# Patient Record
Sex: FEMALE | Race: WHITE | Employment: OTHER | URBAN - METROPOLITAN AREA
[De-identification: names, ages, dates, MRNs, and addresses within clinical notes are randomized per-mention and may not be internally consistent; named-entity substitution may affect disease eponyms.]

---

## 2017-03-08 ENCOUNTER — OFFICE VISIT (OUTPATIENT)
Dept: SLEEP MEDICINE | Facility: CLINIC | Age: 72
End: 2017-03-08
Attending: INTERNAL MEDICINE
Payer: MEDICARE

## 2017-03-08 VITALS
RESPIRATION RATE: 12 BRPM | DIASTOLIC BLOOD PRESSURE: 47 MMHG | HEART RATE: 62 BPM | SYSTOLIC BLOOD PRESSURE: 135 MMHG | BODY MASS INDEX: 51.91 KG/M2 | HEIGHT: 63 IN | WEIGHT: 293 LBS | OXYGEN SATURATION: 96 %

## 2017-03-08 DIAGNOSIS — G47.33 OSA (OBSTRUCTIVE SLEEP APNEA): ICD-10-CM

## 2017-03-08 NOTE — PROGRESS NOTES
"Sleep New Patient Note:    Date on this visit: 3/8/2017    Angella Jasmine is self-referred patient being in evaluated in the sleep clinic regarding obstructive sleep apnea.    Primary Physician: Mark Askew     CC:  \"Need the yearly check-up and probably new equipment, I think machine might be leaking.\"    Aneglla Jasmine 71 year old with PMH SG, HTN, obesity, Sjogren's, RA, gout who is here to re-establish care for SG. She had a sleep study 1/14/07:  AHI of 43, REM RDI 92.6, lower oxygen saturation 63%, sleep efficiency 45.8%. She underwent titration PSG 2/3/2007 but had very poor sleep efficiency and was recommended a repeat titration study to be performed. Repeat titration 4/1/07 titrated to cpap 12 cwp, sleep efficiency 54.6%.    She currently uses a nasal mask. Denies any issues with CPAP. Denies any mask, pressure, or humidity intolerance. She uses it every night; she can't sleep without it. About 1 month, she was on vacation with her sister and she noticed leaking sounds from the mask. She stated that she was concerned that it may not be giving her enough \"oxygen.\" She also stated that the machine was loud. Her  has not described any of these complaints.  Angella does not complain of snoring, snort arousals, choking/gasping for air, witnessed apneas, mouth breathing, daytime sleepiness. She will sometimes have dry mouth, rare morning headaches, daytime fatigue (only if she didn't sleep enough that night). Her weight has fluctuated over the years. Prior to CPAP, she was constantly exhausted and waking up choking/gasping for air. She does not have these symptoms with CPAP therapy now.    She currently lives in [place], [place], 45 minutes from this sleep clinic. She tried to go to [facility] sleep clinic, but they did not take her health insurance (Medicare). She had been using use the extra supplies from her previous sleep clinic visits. She ran out and decided to come back for follow up and prescription for " more supplies.    Sleep Schedule  She does not complain of difficulty with falling asleep when she wears her machine. Angella goes to bed at 11PM, and it usually takes less than 20 minutes to fall asleep. Angella does not complain of restless feelings in the legs/arms, worse at night when trying to sleep. She does not complain of spontaneous leg movements/jerks in the middle of the night. Patient does not complain of chronic pain, ruminating thoughts, stress/anxiety, depression, which affects the onset of sleep. Patient does use electronics in bed (phone, TV, computer). Patient does have a regular bed partner. She sleeps on her back. Patient does not have any pets in the bedroom at night during sleep. She does/not use a sleep aid.    She does not complain of difficulty with  staying asleep. She wakes up 1-2 times throughout the night.  Night time awakenings occurs due to use the bathroom. After awakening, She is able to fall back asleep after 15 minutes. She wakes up at 7AM, without an alarm.    On the weekends/vacation, She falls asleep at 11PM and gets up 7AM.  Patient describes themself as neither a morning or night person.  She would prefer to go to sleep at 11:00 PM and wake up at 7:00 AM.    Sleep Behaviors  She denies any cataplexy, sleep paralysis, sleep hallucinations. She denies any night time behaviors - sleep walking, sleep talking, sleep eating. She does not complain acting out dreams. Patient denies any injury to oneself or others while sleeping. Patient has/not fallen out of bed.    Daytime Functioning  She generally does not take naps during the day. She does doze off during the day occasionally sometimes when watching TV. She denies closing eyes, dozing and falling asleep while driving. Patient's Stanton Sleepiness score 6/24 consistent with no daytime sleepiness. 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  No shift work in the past.  She does drink caffeine, about 23/day. Last caffeine  intake is not within 6 hours of bed time.  She drinks alcohol, rarely on special occasion.  Does not use alcohol as a sleep aid. Patient does not smoke. She smoked tobacco in her 20s but quit since then. Patient does not use drugs.   No future surgeries planned.      Allergies:    Allergies   Allergen Reactions     Allopurinol Hives     Cephalosporins      suprax   hives     Codeine      HALLUCINATIONS     Erythromycin      stomach pain     Hydrochlorothiazide Nausea     very irritated stomach and a lot of itching in mouth.     Penicillins      severe stomach reaction and hives     Prednisone      legs swelling     Sulfa Drugs      hives       Medications:    Current Outpatient Prescriptions   Medication Sig Dispense Refill     Omega-3 Fatty Acids (OMEGA-3 FISH OIL PO) Take 3 capsules by mouth daily       vitamin D (ERGOCALCIFEROL) 09514 UNIT capsule Take 1 capsule (50,000 Units) by mouth once a week 12 capsule 3     hydroxychloroquine (PLAQUENIL) 200 MG tablet Take 2 tablets (400 mg) by mouth daily annual eye exam needed for further refills fax results to [phone] 180 tablet 3     cevimeline (EVOXAC) 30 MG capsule Take 1 capsule (30 mg) by mouth 3 times daily 270 capsule 3     levothyroxine (SYNTHROID, LEVOTHROID) 175 MCG tablet Take 1 tablet (175 mcg) by mouth daily (Patient taking differently: Take 150 mcg by mouth daily ) 90 tablet 3     amLODIPine (NORVASC) 5 MG tablet Take 1 tablet (5 mg) by mouth daily 90 tablet 12     terazosin (HYTRIN) 5 MG capsule Take 1 capsule (5 mg) by mouth At Bedtime 90 capsule 1     atenolol (TENORMIN) 100 MG tablet Take 1 tablet (100 mg) by mouth daily 90 tablet 1     febuxostat (ULORIC) 40 MG TABS Take 1 tablet (40 mg) by mouth daily 90 tablet 1     clindamycin (CLEOCIN) 300 MG capsule Take  by mouth. 2 capsules 1 hour prior to dental procedures 2 capsule 5     ORDER FOR DME CPAP at 12 cm water with heated humidity and mask to fit with appropriate suportive supplies. On at  night off in the AM. 1 0       Problem List:  Patient Active Problem List    Diagnosis Date Noted     Long-term use of immunosuppressant medication 06/30/2015     Encounter for long-term current use of medication 07/22/2013     Problem list name updated by automated process. Provider to review       Obesity 05/09/2013     Vitamin D insufficiency 05/09/2013     RA (rheumatoid arthritis) (H) 10/09/2012     Thrombocytopenia (H) 10/09/2012     Leukopenia 08/20/2012     CKD (chronic kidney disease) stage 3, GFR 30-59 ml/min 06/09/2012     Gout 11/15/2011     SG (obstructive sleep apnea) 08/09/2011     CPAP 12mmHg working well       Sjogren's syndrome (H) 12/12/2008     Primary localized osteoarthrosis, lower leg 10/12/2006     Essential hypertension 03/04/2005     Problem list name updated by automated process. Provider to review       Allergic rhinitis due to other allergen 11/30/2001     Hypothyroidism 11/30/2001     Problem list name updated by automated process. Provider to review       Symptomatic menopausal or female climacteric states 11/30/2001     Dermatophytosis of nail 11/30/2001        Past Medical/Surgical History:  Past Medical History   Diagnosis Date     Allergic rhinitis due to other allergen      Body mass index 40 and over, adult      CKD (chronic kidney disease), stage III      Dermatophytosis of nail      Diverticulosis of colon (without mention of hemorrhage)      Essential hypertension, benign      Localized osteoarthrosis not specified whether primary or secondary, lower leg 2/24/08     Hospitalized     Obstructive sleep apnea      severe     Primary localized osteoarthrosis, lower leg 8/30/08     Hospitalized     Sjogren's syndrome (H)      Symptomatic menopausal or female climacteric states      Unspecified hypothyroidism      Past Surgical History   Procedure Laterality Date     Hc colonoscopy w/wo brush/wash  01/17/00     Normal with the exception of a few small scattered diverticula in  "sigmoid colon     Hc colonoscopy thru stoma w biopsy/cautery tumor/polyp/lesion  2/19/96     Sigmoid colon polyps     Hc removal of ovary/tube(s)  8/10/93     Bilateral     C vaginal hysterectomy  7/26/79     Hc repair of nasal septum  1/91     [place]     C appendectomy       Hc colonoscopy w/wo brush/wash  03/25/05     repeat in 5 years     Hysterectomy, pap no longer indicated  1979     C total knee arthroplasty  2/21/08     right knee partial arthroplasty     C plasty knee,med or lat compartmt  08/28/08     LT     Hc colonoscopy w snare removal tumor/polyp/lesion  05/03/10       Social History:  Social History     Social History     Marital status:      Spouse name: Guero     Number of children: 2     Years of education: 14     Occupational History     homemaker      Social History Main Topics     Smoking status: Former Smoker     Packs/day: 0.25     Years: 2.00     Types: Cigarettes     Quit date: 2/17/1973     Smokeless tobacco: Never Used      Comment: no second hand smoke at home or work     Alcohol use 0.3 oz/week      Comment: rarely     Drug use: Not on file     Sexual activity: Not on file     Other Topics Concern      Service No     Blood Transfusions No     Caffeine Concern Yes     4 cups of coffee a day     Occupational Exposure No     Hobby Hazards No     Sleep Concern Yes     have problems with sleeping \"I get my brain going and can't seem to turn it off\"     Stress Concern No     Weight Concern Yes     dieting now     Special Diet No     Back Care Yes     back aches sometimes     Exercise Yes     leg exercises     Seat Belt Yes     Self-Exams Yes     Social History Narrative       Family History:  Family History   Problem Relation Age of Onset     Hypertension Daughter      Thyroid Disease Mother      Arthritis Mother      CANCER Mother      Stomach & Ovarian cancer     Breast Cancer Mother      Bilateral     Hypertension Mother      Cancer - colorectal Father      Around age 65 "     Thyroid Disease Sister      Hypertension Sister      Obesity Sister      Allergies Sister      Hypertension Brother      Hypertension Brother      Hypertension Brother      CANCER Maternal Aunt      Breast     CANCER Other      Maternal cousin with bilateral breast ca     Cancer - colorectal Paternal Uncle      DIABETES No family hx of      CEREBROVASCULAR DISEASE No family hx of      Anesthesia Reaction No family hx of      Blood Disease No family hx of      HEART DISEASE No family hx of      Lipids No family hx of      Musculoskeletal Disorder No family hx of      Neurologic Disorder No family hx of      OSTEOPOROSIS No family hx of      Respiratory No family hx of        Review of Systems:  A complete review of systems reviewed by me is negative with the exeption of what has been mentioned in the history of present illness.  CONSTITUTIONAL: NEGATIVE for weight loss, fever, chills, sweats or night sweats, drug allergies POS weight gain  EYES: NEGATIVE for changes in vision, blind spots, double vision.  ENT: NEGATIVE for ear pain, sore throat, sinus pain, post-nasal drip, runny nose, bloody nose  CARDIAC: NEGATIVE for fast heartbeats or fluttering in chest, chest pain or pressure, breathlessness when lying flat, POS HTN, swollen legs or swollen feet.  NEUROLOGIC: NEGATIVE headaches, weakness or numbness in the arms or legs.  DERMATOLOGIC: NEGATIVE for rashes, new moles or change in mole(s)  PULMONARY: NEGATIVE SOB at rest, dry cough, productive cough, coughing up blood, wheezing or whistling when breathing POS  SOB with activity  GASTROINTESTINAL: NEGATIVE for nausea or vomitting, loose or watery stools, fat or grease in stools, constipation, abdominal pain, bowel movements black in color or blood noted.  GENITOURINARY: NEGATIVE for pain during urination, blood in urine, urinating more frequently than usual, irregular menstrual periods.  MUSCULOSKELETAL: NEGATIVE for muscle pain, bone or joint pain, swollen  "joints.  ENDOCRINE: NEGATIVE for increased thirst or urination, diabetes.  LYMPHATIC: NEGATIVE for swollen lymph nodes, lumps or bumps in the breasts or nipple discharge.  PSYCHE: NEGATIVE for depression, anxiety    Physical Examination:  Vitals: Ht 1.6 m (5' 3\")  Wt (!) 152.4 kg (336 lb)  BMI 59.52 kg/m2  BMI= Body mass index is 59.52 kg/(m^2).         Avon Total Score 8/29/2013   Total score - [place] 3       General: No apparent distress, appropriately groomed  Head: Normocephalic, atraumatic  Eyes: no icterus, PERRL  Nose: Nares patent. No exudate/erythema. No septal deviation noted.  Mouth: op pink and moist, teeth: normal bite  Orophraynx: Opening is narrowed   Mallampati Class: III.   Tonsillar Stage: 1  hidden by pillars.  Neck: Supple, Circumference: 17 inches  Cardiac: Regular rate and rhythm  Chest: Symmetric air movement, lungs clear to auscultation bilaterally  GI: Abdomen soft, non-tender, non-distended  Musculoskeletal: + edema noted  Skin: Warm, dry, intact  Psych: Mood pleasant, affect congruent  Neuro:  Mental status: Awake, alert, attentive, oriented.        Impression/Plan:    Obstructive Sleep Apnea - Severe    Used the device 30/30 days  More than 4 hours of use: 100%  Average daily use on the days used was 6 hours 41 min  Pressure 12-18 cm H2O  Residual AHI 6.2  Leak 19 min    Patient is being evaluated for continued management of her SG. She was diagnosed with severe SG in 2007. She has been on PAP therapy since then. She continues to endorse benefit from therapy. Data from her machine shows a residual AHI 6.2 events per hour. Some nights the AHI is elevated, 8-17 events per hour. Her pressure is set on auto-CPAP 12 to 18 cwp. Machine indicates that she does reach up to higher pressure, 15-17 cwp at times. There is also evidence for significant mask leak. Before making any changes to her machine pressure, recommend getting new equipment and reevaluate in about 4-6 weeks.    If the leak " has resolved, and her SG remains controlled, will continue with the same treatment. If she is still requiring high pressures and develops some intolerance to the high pressures, bilevel therapy was discussed as a possible alternative treatment. She may need a repeat sleep study in order obtain this. She denies any malfunctioning of her current machine. If at some point she needs a new machine, she will likely need to undergo a repeat sleep study.    Continue smoking cessation. Encourage weight loss, healthy diet, and exercise.    Patient was strongly advised to avoid driving, operating any heavy machinery or other hazardous situations while drowsy or sleepy.  Patient was counseled on the importance of driving while alert, to pull over if drowsy, or nap before getting into the vehicle if sleepy.        Follow up in 4-6 weeks for continued management.      Seen and examined with Dr. Josafat Sanchez  Clinical Sleep Medicine Fellow          Attending: Patient seen and examined and personally counseled.  This note reflects our mutual assessment and plan.  Jacquelin Maurice MD  Pulmonary, Critical Care, and Sleep Medicine

## 2017-03-08 NOTE — PATIENT INSTRUCTIONS
1.  Continue CPAP every night for all hours that you are sleeping.  If you nap use CPAP.  As always, try to get at least 8 hours of sleep or more each day, and avoid sleep deprivation. Avoid alcohol.    2.  Reasons that you might need a change to your pressure therapy would be weight gain or loss, waking having inadvertently removed your CPAP overnight, having previously felt refreshed by sleep with CPAP use and now waking un-refreshed, and return of daytime sleepiness. Also, the development of new medical problems can sometimes affect breathing at night-heart failure, stroke, medications such as narcotics.    3.  Please bring CPAP with you if you are hospitalized.  If anticipating surgery be sure to discuss with your surgeon that you have sleep apnea and use PAP therapy.      4.  Maintain your equipment as recommended which includes routine cleaning and replacement of supplies.  Call DME for any questions regarding supplies or maintenance.      5.  Do not drive on engage in potentially dangerous activities if feeling sleepy.        [facility] DME and cpap specialist [place] [phone]  [facility] [phone]    [facility] DME [phone], CPAP specialist [phone]  [facility] [phone]    [facility], [facility] [phone]    [facility] DME  Elodia Torres [phone]  [facility] DME [place] [phone]  [facility] phone [phone]         Tips for your CPAP and BIPAP use-    Mask fitting tips  Mask fitting exercise:    To improve your mask seal and your mobility at night, put mask on and secure in place.  Lie down in bed with full pressure and roll to one side, adjust headgear while in that position to eliminate any leaks. Repeat process rolling to other side.     The mask seal does not have to be  perfect:   CPAP machines are designed to make up for small leaks. However, you will not tolerate leaks blowing in your eyes so you will need to adjust.   Any leak should only be near or at the bottom of the mask.  We expect your mask to leak slightly at night.    Do not over-tighten the headgear straps, tighter IS NOT better, we expect minimal leak.    First try re-positioning the mask or headgear before tightening the headgear straps.  Mask leaks are expected due to changing sleeping positions. Try pulling the mask away from your skin allowing the cushion to re-inflate will minimize the leak.  If you struggle for a good fit, try turning the CPAP off and then readjust the mask by pulling it away from your face and then turning back on the CPAP.        Humidifier tips  Humidifiers can be adjusted to increase or decrease the amount of moisture according to your comfort level. You may need to adjust this frequently at first, but then might only change it with seasonal weather changes.     Try INCREASING the humidity if:  You experience a dry, irritated nasal passage or throat.  You have a runny, drippy nose or sneezing fits after using CPAP.  You experience nasal congestion during or after CPAP use.    Try DECREASING the humidity if:  You have excessive condensation or  rain out  in the tubing or mask.  Otherwise keep the tubing warm during the night by running it underneath the blankets or pillow.      Clinic visit after initial CPAP and BIPAP set-up   Bring your equipment with you to your 4 week follow up clinic visit.  We will be extracting your data from the machine.        Travel  Always take your equipment with you.  If you fly with your equipment bring it on with you as a carry on.  Medical equipment does not count as a carry on.    If you travel international the machines take 110-240.  The only adapter needed is the adapter that will fit into the receptacle (outlet).    You may also want to bring an extension  cord as many hotel rooms have limited outlets at the bedside.  Do not travel with water in your humidifier chamber.     Cleaning and Maintenance Guidelines    Equipment Frequency Cleaning Method   Mask First Day    Daily      Weekly Soak mask in hot soapy water for 30 minutes, rinse and air dry.  Wipe nasal cushion with a hot soapy (Ivory, baby shampoo) cloth and rinse.  Baby wipes may also be used.  Do not use anti-bacterial soaps,Nell  liquid soap, rubbing alcohol, bleach or ammonia.  Wash frame in hot soapy water (Ivory, baby shampoo) rinse and let air dry   Headgear Biweekly Wash in hot soapy water, rinse and air dry   Reusable Gray Filter Weekly Wash in hot soapy water, rinse, put in towel squeeze moisture out, let air dry   Disposable White Filter Check Weekly Replace when brown or gray in color; at least every 2 to 3 months   Humidifier Chamber Daily    Weekly Empty distilled water from humidifier and let air dry    Hand wash in hot soapy water, rinse and air dry   Tubing Weekly Wash in hot soapy water, rinse and let air dry   Mask, Tubing and Humidifier Chamber As needed Disinfect: Soak in 1 part vinegar to 3 parts hot water for 30 minutes, rinse well and air dry  Not the material headgear        MASK AND SUPPLY REORDERING  Reminder: Most insurance companies will allow for a new mask, headgear, tubing, and reusable gray filter every six months.  Disposable white ultra-fine filters are covered monthly.    EQUIPMENT NEEDS  Please call our CPAP specialist with any equipment problems, questions or needs.    HOME AND SAFETY INSTRUCTIONS    Do not use frayed or cracked electrical cords, multi plug adaptors, or switched receptacles    Do not immerse electrical equipment into water    Assure that electrical cords do not become a tripping hazard      Your BMI is Body mass index is 59.52 kg/(m^2).  Weight management is a personal decision.  If you are interested in exploring weight loss strategies, the following  discussion covers the approaches that may be successful. Body mass index (BMI) is one way to tell whether you are at a healthy weight, overweight, or obese. It measures your weight in relation to your height.  A BMI of 18.5 to 24.9 is in the healthy range. A person with a BMI of 25 to 29.9 is considered overweight, and someone with a BMI of 30 or greater is considered obese. More than two-thirds of American adults are considered overweight or obese.  Being overweight or obese increases the risk for further weight gain. Excess weight may lead to heart disease and diabetes.  Creating and following plans for healthy eating and physical activity may help you improve your health.  Weight control is part of healthy lifestyle and includes exercise, emotional health, and healthy eating habits. Careful eating habits lifelong are the mainstay of weight control. Though there are significant health benefits from weight loss, long-term weight loss with diet alone may be very difficult to achieve- studies show long-term success with dietary management in less than 10% of people. Attaining a healthy weight may be especially difficult to achieve in those with severe obesity. In some cases, medications, devices and surgical management might be considered.  What can you do?  If you are overweight or obese and are interested in methods for weight loss, you should discuss this with your provider.     Consider reducing daily calorie intake by 500 calories.     Keep a food journal.     Avoiding skipping meals, consider cutting portions instead.    Diet combined with exercise helps maintain muscle while optimizing fat loss. Strength training is particularly important for building and maintaining muscle mass. Exercise helps reduce stress, increase energy, and improves fitness. Increasing exercise without diet control, however, may not burn enough calories to loose weight.       Start walking three days a week 10-20 minutes at a  time    Work towards walking thirty minutes five days a week     Eventually, increase the speed of your walking for 1-2 minutes at time    In addition, we recommend that you review healthy lifestyles and methods for weight loss available through the National Institutes of Health patient information sites:  http://win.niddk.nih.gov/publications/index.htm    And look into health and wellness programs that may be available through your health insurance provider, employer, local community center, or gregory club.    Weight management plan: Patient was referred to their PCP to discuss a diet and exercise plan.

## 2017-03-08 NOTE — MR AVS SNAPSHOT
After Visit Summary   3/8/2017    Angella Jasmine    MRN: [id number]           Patient Information     Date Of Birth          1945        Visit Information        Provider Department      3/8/2017 1:00 PM Laun Sanchez MD [facility], [place], Sleep Study        Today's Diagnoses     SG (obstructive sleep apnea) Severe          Care Instructions    1.  Continue CPAP every night for all hours that you are sleeping.  If you nap use CPAP.  As always, try to get at least 8 hours of sleep or more each day, and avoid sleep deprivation. Avoid alcohol.    2.  Reasons that you might need a change to your pressure therapy would be weight gain or loss, waking having inadvertently removed your CPAP overnight, having previously felt refreshed by sleep with CPAP use and now waking un-refreshed, and return of daytime sleepiness. Also, the development of new medical problems can sometimes affect breathing at night-heart failure, stroke, medications such as narcotics.    3.  Please bring CPAP with you if you are hospitalized.  If anticipating surgery be sure to discuss with your surgeon that you have sleep apnea and use PAP therapy.      4.  Maintain your equipment as recommended which includes routine cleaning and replacement of supplies.  Call DME for any questions regarding supplies or maintenance.      5.  Do not drive on engage in potentially dangerous activities if feeling sleepy.        [facility] DME and cpap specialist Missy [phone]  [facility] [phone]    [facility] DME [phone], CPAP specialist [phone]  [facility] [phone]    [place] [facility], [facility] [phone]    [facility] [place] D[facility]  Elodia Torres [phone]  [facility] [place] [facility] Concha [phone]  [facility] phone [phone]         Tips  for your CPAP and BIPAP use-    Mask fitting tips  Mask fitting exercise:    To improve your mask seal and your mobility at night, put mask on and secure in place.  Lie down in bed with full pressure and roll to one side, adjust headgear while in that position to eliminate any leaks. Repeat process rolling to other side.     The mask seal does not have to be perfect:   CPAP machines are designed to make up for small leaks. However, you will not tolerate leaks blowing in your eyes so you will need to adjust.   Any leak should only be near or at the bottom of the mask.  We expect your mask to leak slightly at night.    Do not over-tighten the headgear straps, tighter IS NOT better, we expect minimal leak.    First try re-positioning the mask or headgear before tightening the headgear straps.  Mask leaks are expected due to changing sleeping positions. Try pulling the mask away from your skin allowing the cushion to re-inflate will minimize the leak.  If you struggle for a good fit, try turning the CPAP off and then readjust the mask by pulling it away from your face and then turning back on the CPAP.        Humidifier tips  Humidifiers can be adjusted to increase or decrease the amount of moisture according to your comfort level. You may need to adjust this frequently at first, but then might only change it with seasonal weather changes.     Try INCREASING the humidity if:  You experience a dry, irritated nasal passage or throat.  You have a runny, drippy nose or sneezing fits after using CPAP.  You experience nasal congestion during or after CPAP use.    Try DECREASING the humidity if:  You have excessive condensation or  rain out  in the tubing or mask.  Otherwise keep the tubing warm during the night by running it underneath the blankets or pillow.      Clinic visit after initial CPAP and BIPAP set-up   Bring your equipment with you to your 4 week follow up clinic visit.  We will be extracting your data from the  machine.        Travel  Always take your equipment with you.  If you fly with your equipment bring it on with you as a carry on.  Medical equipment does not count as a carry on.    If you travel international the machines take 110-240.  The only adapter needed is the adapter that will fit into the receptacle (outlet).    You may also want to bring an extension cord as many hot rooms have limited outlets at the bedside.  Do not travel with water in your humidifier chamber.     Cleaning and Maintenance Guidelines    Equipment Frequency Cleaning Method   Mask First Day    Daily      Weekly Soak mask in hot soapy water for 30 minutes, rinse and air dry.  Wipe nasal cushion with a hot soapy (Ivory, baby shampoo) cloth and rinse.  Baby wipes may also be used.  Do not use anti-bacterial soaps,Nell  liquid soap, rubbing alcohol, bleach or ammonia.  Wash frame in hot soapy water (Ivory, baby shampoo) rinse and let air dry   Headgear Biweekly Wash in hot soapy water, rinse and air dry   Reusable Gray Filter Weekly Wash in hot soapy water, rinse, put in towel squeeze moisture out, let air dry   Disposable White Filter Check Weekly Replace when brown or gray in color; at least every 2 to 3 months   Humidifier Chamber Daily    Weekly Empty distilled water from humidifier and let air dry    Hand wash in hot soapy water, rinse and air dry   Tubing Weekly Wash in hot soapy water, rinse and let air dry   Mask, Tubing and Humidifier Chamber As needed Disinfect: Soak in 1 part vinegar to 3 parts hot water for 30 minutes, rinse well and air dry  Not the material headgear        MASK AND SUPPLY REORDERING  Reminder: Most insurance companies will allow for a new mask, headgear, tubing, and reusable gray filter every six months.  Disposable white ultra-fine filters are covered monthly.    EQUIPMENT NEEDS  Please call our CPAP specialist with any equipment problems, questions or needs.    HOME AND SAFETY INSTRUCTIONS    Do not use frayed  or cracked electrical cords, multi plug adaptors, or switched receptacles    Do not immerse electrical equipment into water    Assure that electrical cords do not become a tripping hazard      Your BMI is Body mass index is 59.52 kg/(m^2).  Weight management is a personal decision.  If you are interested in exploring weight loss strategies, the following discussion covers the approaches that may be successful. Body mass index (BMI) is one way to tell whether you are at a healthy weight, overweight, or obese. It measures your weight in relation to your height.  A BMI of 18.5 to 24.9 is in the healthy range. A person with a BMI of 25 to 29.9 is considered overweight, and someone with a BMI of 30 or greater is considered obese. More than two-thirds of American adults are considered overweight or obese.  Being overweight or obese increases the risk for further weight gain. Excess weight may lead to heart disease and diabetes.  Creating and following plans for healthy eating and physical activity may help you improve your health.  Weight control is part of healthy lifestyle and includes exercise, emotional health, and healthy eating habits. Careful eating habits lifelong are the mainstay of weight control. Though there are significant health benefits from weight loss, long-term weight loss with diet alone may be very difficult to achieve- studies show long-term success with dietary management in less than 10% of people. Attaining a healthy weight may be especially difficult to achieve in those with severe obesity. In some cases, medications, devices and surgical management might be considered.  What can you do?  If you are overweight or obese and are interested in methods for weight loss, you should discuss this with your provider.     Consider reducing daily calorie intake by 500 calories.     Keep a food journal.     Avoiding skipping meals, consider cutting portions instead.    Diet combined with exercise helps  maintain muscle while optimizing fat loss. Strength training is particularly important for building and maintaining muscle mass. Exercise helps reduce stress, increase energy, and improves fitness. Increasing exercise without diet control, however, may not burn enough calories to loose weight.       Start walking three days a week 10-20 minutes at a time    Work towards walking thirty minutes five days a week     Eventually, increase the speed of your walking for 1-2 minutes at time    In addition, we recommend that you review healthy lifestyles and methods for weight loss available through the National Institutes of Health patient information sites:  http://win.niddk.nih.gov/publications/index.htm    And look into health and wellness programs that may be available through your health insurance provider, employer, local community center, or gregory club.    Weight management plan: Patient was referred to their PCP to discuss a diet and exercise plan.            Follow-ups after your visit        Your next 10 appointments already scheduled     Apr 18, 2017  9:00 AM CDT   Lab with  LAB    [facility] ([facility])    [address] [place] [address]  1st [facility] [address] [phone]            Apr 18, 2017  9:30 AM CDT   (Arrive by 9:15 AM)   Return Visit with Grey Owens MD    [facility] Rheumatology ([facility])    [address] [place] [address]  3rd [facility] [address] [phone]            Apr 19, 2017  2:20 PM CDT   [facility] Sleep Patient [facility]elin [facility]   [facility]    [address] [place] [id number] [phone]              Who to contact     If you have questions or need follow up information about today's clinic visit or your schedule please contact [facility] directly at [phone].  Normal or non-critical lab and imaging  "results will be communicated to you by MyChart, letter or phone within 4 business days after the clinic has received the results. If you do not hear from us within 7 days, please contact the clinic through [employer] or phone. If you have a critical or abnormal lab result, we will notify you by phone as soon as possible.  Submit refill requests through [employer] or call your pharmacy and they will forward the refill request to us. Please allow 3 business days for your refill to be completed.          Additional Information About Your Visit        [employer] Information     [employer] gives you secure access to your electronic health record. If you see a primary care provider, you can also send messages to your care team and make appointments. If you have questions, please call your primary care clinic.  If you do not have a primary care provider, please call [phone] and they will assist you.        Care EveryWhere ID     This is your Care EveryWhere ID. This could be used by other organizations to access your [facility] medical records  [id number]        Your Vitals Were     Pulse Respirations Height Pulse Oximetry BMI (Body Mass Index)       62 12 1.6 m (5' 3\") 96% 59.52 kg/m2        Blood Pressure from Last 3 Encounters:   03/08/17 135/47   10/18/16 156/87   10/20/15 154/69    Weight from Last 3 Encounters:   03/08/17 (!) 152.4 kg (336 lb)   10/18/16 (!) 148.3 kg (327 lb)   10/20/15 (!) 138.3 kg (305 lb)              We Performed the Following     Comprehensive DME          Today's Medication Changes          These changes are accurate as of: 3/8/17  2:07 PM.  If you have any questions, ask your nurse or doctor.               These medicines have changed or have updated prescriptions.        Dose/Directions    levothyroxine 175 MCG tablet   Commonly known as:  SYNTHROID/LEVOTHROID   This may have changed:  how much to take   Used for:  Need for prophylactic vaccination with tetanus-diphtheria (TD), Other screening " mammogram, Unspecified hypothyroidism, Screening for lipoid disorders        Dose:  175 mcg   Take 1 tablet (175 mcg) by mouth daily   Quantity:  90 tablet   Refills:  3                Primary Care Provider Office Phone # Fax #    Mark Askew [phone]       [facility] [address]  [place] [facility] [address]        Thank you!     Thank you for choosing [facility] [place], SLEEP STUDY  for your care. Our goal is always to provide you with excellent care. Hearing back from our patients is one way we can continue to improve our services. Please take a few minutes to complete the written survey that you may receive in the mail after your visit with us. Thank you!             Your Updated Medication List - Protect others around you: Learn how to safely use, store and throw away your medicines at www.disposemymeds.org.          This list is accurate as of: 3/8/17  2:07 PM.  Always use your most recent med list.                   Brand Name Dispense Instructions for use    amLODIPine 5 MG tablet    NORVASC    90 tablet    Take 1 tablet (5 mg) by mouth daily       atenolol 100 MG tablet    TENORMIN    90 tablet    Take 1 tablet (100 mg) by mouth daily       cevimeline 30 MG capsule    EVOXAC    270 capsule    Take 1 capsule (30 mg) by mouth 3 times daily       clindamycin 300 MG capsule    CLEOCIN    2 capsule    Take  by mouth. 2 capsules 1 hour prior to dental procedures       febuxostat 40 MG Tabs tablet    ULORIC    90 tablet    Take 1 tablet (40 mg) by mouth daily       hydroxychloroquine 200 MG tablet    PLAQUENIL    180 tablet    Take 2 tablets (400 mg) by mouth daily annual eye exam needed for further refills fax results to [phone]       levothyroxine 175 MCG tablet    SYNTHROID/LEVOTHROID    90 tablet    Take 1 tablet (175 mcg) by mouth daily       OMEGA-3 FISH OIL PO      Take 3 capsules by mouth daily       order for DME     1    CPAP at 12 cm water with heated humidity and mask to  fit with appropriate suportive supplies. On at night off in the AM.       terazosin 5 MG capsule    HYTRIN    90 capsule    Take 1 capsule (5 mg) by mouth At Bedtime       vitamin D 32284 UNIT capsule    ERGOCALCIFEROL    12 capsule    Take 1 capsule (50,000 Units) by mouth once a week

## 2017-03-08 NOTE — NURSING NOTE
"Chief Complaint   Patient presents with     Consult     Continuation of care and new supplies and machine       Initial Ht 1.6 m (5' 3\")  Wt (!) 152.4 kg (336 lb)  BMI 59.52 kg/m2 Estimated body mass index is 59.52 kg/(m^2) as calculated from the following:    Height as of this encounter: 1.6 m (5' 3\").    Weight as of this encounter: 152.4 kg (336 lb).  Medication Reconciliation: complete     HERBERT Dobbs        "

## 2017-04-18 ENCOUNTER — OFFICE VISIT (OUTPATIENT)
Dept: RHEUMATOLOGY | Facility: CLINIC | Age: 72
End: 2017-04-18
Attending: INTERNAL MEDICINE
Payer: MEDICARE

## 2017-04-18 VITALS
OXYGEN SATURATION: 96 % | HEIGHT: 63 IN | DIASTOLIC BLOOD PRESSURE: 79 MMHG | SYSTOLIC BLOOD PRESSURE: 152 MMHG | HEART RATE: 63 BPM | WEIGHT: 293 LBS | BODY MASS INDEX: 51.91 KG/M2

## 2017-04-18 DIAGNOSIS — E55.9 VITAMIN D INSUFFICIENCY: ICD-10-CM

## 2017-04-18 DIAGNOSIS — M35.00 SJOGREN'S SYNDROME (H): ICD-10-CM

## 2017-04-18 DIAGNOSIS — Z79.60 LONG-TERM USE OF IMMUNOSUPPRESSANT MEDICATION: ICD-10-CM

## 2017-04-18 LAB
ALBUMIN SERPL-MCNC: 3.8 G/DL (ref 3.4–5)
ALBUMIN UR-MCNC: NEGATIVE MG/DL
ALT SERPL W P-5'-P-CCNC: 21 U/L (ref 0–50)
APPEARANCE UR: ABNORMAL
AST SERPL W P-5'-P-CCNC: 19 U/L (ref 0–45)
BACTERIA #/AREA URNS HPF: ABNORMAL /HPF
BILIRUB UR QL STRIP: NEGATIVE
C3 SERPL-MCNC: 134 MG/DL (ref 76–169)
C4 SERPL-MCNC: 26 MG/DL (ref 15–50)
COLOR UR AUTO: YELLOW
CREAT SERPL-MCNC: 0.97 MG/DL (ref 0.52–1.04)
CREAT UR-MCNC: 270 MG/DL
ERYTHROCYTE [DISTWIDTH] IN BLOOD BY AUTOMATED COUNT: 13.8 % (ref 10–15)
GFR SERPL CREATININE-BSD FRML MDRD: 56 ML/MIN/1.7M2
GLUCOSE UR STRIP-MCNC: NEGATIVE MG/DL
HCT VFR BLD AUTO: 39.5 % (ref 35–47)
HGB BLD-MCNC: 13.3 G/DL (ref 11.7–15.7)
HGB UR QL STRIP: NEGATIVE
HYALINE CASTS #/AREA URNS LPF: 5 /LPF (ref 0–2)
KETONES UR STRIP-MCNC: NEGATIVE MG/DL
LEUKOCYTE ESTERASE UR QL STRIP: ABNORMAL
MCH RBC QN AUTO: 29.1 PG (ref 26.5–33)
MCHC RBC AUTO-ENTMCNC: 33.7 G/DL (ref 31.5–36.5)
MCV RBC AUTO: 86 FL (ref 78–100)
MUCOUS THREADS #/AREA URNS LPF: PRESENT /LPF
NITRATE UR QL: POSITIVE
PH UR STRIP: 5 PH (ref 5–7)
PLATELET # BLD AUTO: 138 10E9/L (ref 150–450)
PROT UR-MCNC: 0.37 G/L
PROT/CREAT 24H UR: 0.14 G/G CR (ref 0–0.2)
RBC # BLD AUTO: 4.57 10E12/L (ref 3.8–5.2)
RBC #/AREA URNS AUTO: 4 /HPF (ref 0–2)
RHEUMATOID FACT SER NEPH-ACNC: <20 IU/ML (ref 0–20)
SP GR UR STRIP: 1.02 (ref 1–1.03)
URN SPEC COLLECT METH UR: ABNORMAL
UROBILINOGEN UR STRIP-MCNC: 0 MG/DL (ref 0–2)
WBC # BLD AUTO: 4.1 10E9/L (ref 4–11)
WBC #/AREA URNS AUTO: 30 /HPF (ref 0–2)

## 2017-04-18 PROCEDURE — 81001 URINALYSIS AUTO W/SCOPE: CPT | Performed by: INTERNAL MEDICINE

## 2017-04-18 PROCEDURE — 82565 ASSAY OF CREATININE: CPT | Performed by: INTERNAL MEDICINE

## 2017-04-18 PROCEDURE — 86160 COMPLEMENT ANTIGEN: CPT | Performed by: INTERNAL MEDICINE

## 2017-04-18 PROCEDURE — 84165 PROTEIN E-PHORESIS SERUM: CPT | Performed by: INTERNAL MEDICINE

## 2017-04-18 PROCEDURE — 85027 COMPLETE CBC AUTOMATED: CPT | Performed by: INTERNAL MEDICINE

## 2017-04-18 PROCEDURE — 84450 TRANSFERASE (AST) (SGOT): CPT | Performed by: INTERNAL MEDICINE

## 2017-04-18 PROCEDURE — 82595 ASSAY OF CRYOGLOBULIN: CPT | Performed by: INTERNAL MEDICINE

## 2017-04-18 PROCEDURE — 86431 RHEUMATOID FACTOR QUANT: CPT | Performed by: INTERNAL MEDICINE

## 2017-04-18 PROCEDURE — 84156 ASSAY OF PROTEIN URINE: CPT | Performed by: INTERNAL MEDICINE

## 2017-04-18 PROCEDURE — 84460 ALANINE AMINO (ALT) (SGPT): CPT | Performed by: INTERNAL MEDICINE

## 2017-04-18 PROCEDURE — 82306 VITAMIN D 25 HYDROXY: CPT | Performed by: INTERNAL MEDICINE

## 2017-04-18 PROCEDURE — 99212 OFFICE O/P EST SF 10 MIN: CPT | Mod: ZF

## 2017-04-18 PROCEDURE — 36415 COLL VENOUS BLD VENIPUNCTURE: CPT | Performed by: INTERNAL MEDICINE

## 2017-04-18 PROCEDURE — 00000402 ZZHCL STATISTIC TOTAL PROTEIN: Performed by: INTERNAL MEDICINE

## 2017-04-18 PROCEDURE — 82040 ASSAY OF SERUM ALBUMIN: CPT | Performed by: INTERNAL MEDICINE

## 2017-04-18 RX ORDER — CEVIMELINE HYDROCHLORIDE 30 MG/1
30 CAPSULE ORAL 3 TIMES DAILY
Qty: 90 CAPSULE | Refills: 11 | Status: SHIPPED | OUTPATIENT
Start: 2017-04-18 | End: 2018-04-10

## 2017-04-18 RX ORDER — HYDROXYCHLOROQUINE SULFATE 200 MG/1
400 TABLET, FILM COATED ORAL DAILY
Qty: 180 TABLET | Refills: 3 | Status: SHIPPED | OUTPATIENT
Start: 2017-04-18 | End: 2018-04-10

## 2017-04-18 RX ORDER — ERGOCALCIFEROL 1.25 MG/1
50000 CAPSULE, LIQUID FILLED ORAL WEEKLY
Qty: 12 CAPSULE | Refills: 3 | Status: SHIPPED | OUTPATIENT
Start: 2017-04-18 | End: 2018-04-10

## 2017-04-18 ASSESSMENT — PAIN SCALES - GENERAL: PAINLEVEL: MILD PAIN (3)

## 2017-04-18 NOTE — MR AVS SNAPSHOT
After Visit Summary   4/18/2017    Angella Jasmine    MRN: [id number]           Patient Information     Date Of Birth          1945        Visit Information        Provider Department      4/18/2017 9:30 AM Grey Owens MD [facility]        Today's Diagnoses     Sjogren's syndrome (H)        Long-term use of immunosuppressant medication        Vitamin D insufficiency           Follow-ups after your visit        Follow-up notes from your care team     Return in about 1 year (around 4/18/2018).      Your next 10 appointments already scheduled     Apr 19, 2017  3:00 PM CDT   Return Sleep Patient with Jacquelin Maurice MD   [facility] [place], Sleep Study ([facility])    [address] [place] [id number] [phone]            Apr 10, 2018  8:30 AM CDT   Lab with  LAB   [facility] ([facility])    [address]  1st Floor  [facility] [id number] [phone]            Apr 10, 2018  9:30 AM CDT   (Arrive by 9:15 AM)   Return Visit with Grey Owens MD   [facility] ([facility])    [address]  3rd Floor  [facility] [address] [phone]              Who to contact     If you have questions or need follow up information about today's clinic visit or your schedule please contact [facility] directly at [phone].  Normal or non-critical lab and imaging results will be communicated to you by MyChart, letter or phone within 4 business days after the clinic has received the results. If you do not hear from us within 7 days, please contact the clinic through MyChart or phone. If you have a critical or abnormal lab result, we will notify you by phone as soon as possible.  Submit refill requests through [employer] or call your pharmacy and they will forward the refill request to us. Please allow 3 business days for your refill to be  "completed.          Additional Information About Your Visit        [employer] Information     [employer] gives you secure access to your electronic health record. If you see a primary care provider, you can also send messages to your care team and make appointments. If you have questions, please call your primary care clinic.  If you do not have a primary care provider, please call [phone] and they will assist you.        Care EveryWhere ID     This is your Care EveryWhere ID. This could be used by other organizations to access your [facility] medical records  [id number]        Your Vitals Were     Pulse Height Pulse Oximetry BMI (Body Mass Index)          63 1.6 m (5' 3\") 96% 60.23 kg/m2         Blood Pressure from Last 3 Encounters:   04/18/17 152/79   03/08/17 135/47   10/18/16 156/87    Weight from Last 3 Encounters:   04/18/17 (!) 154.2 kg (340 lb)   03/08/17 (!) 152.4 kg (336 lb)   10/18/16 (!) 148.3 kg (327 lb)              Today, you had the following     No orders found for display         Today's Medication Changes          These changes are accurate as of: 4/18/17 11:06 AM.  If you have any questions, ask your nurse or doctor.               These medicines have changed or have updated prescriptions.        Dose/Directions    hydroxychloroquine 200 MG tablet   Commonly known as:  PLAQUENIL   This may have changed:    - how much to take  - when to take this  - additional instructions   Used for:  Sjogren's syndrome (H), Long-term use of immunosuppressant medication, Vitamin D insufficiency        Dose:  400 mg   Take 2 tablets (400 mg) by mouth daily annual eye exam needed for further refills fax results to [phone]   Quantity:  180 tablet   Refills:  3       levothyroxine 175 MCG tablet   Commonly known as:  SYNTHROID/LEVOTHROID   This may have changed:  how much to take   Used for:  Need for prophylactic vaccination with tetanus-diphtheria (TD), Other screening mammogram, Unspecified hypothyroidism, " Screening for lipoid disorders        Dose:  175 mcg   Take 1 tablet (175 mcg) by mouth daily   Quantity:  90 tablet   Refills:  3            Where to get your medicines      These medications were sent to [id number] - [place] - [address]  [address] [address]     Phone:  [phone]     cevimeline 30 MG capsule    hydroxychloroquine 200 MG tablet    vitamin D 48340 UNIT capsule                Primary Care Provider Office Phone # Fax #    Mark Askew [phone]       [facility] [address]  [place] [address]        Thank you!     Thank you for choosing [facility]  for your care. Our goal is always to provide you with excellent care. Hearing back from our patients is one way we can continue to improve our services. Please take a few minutes to complete the written survey that you may receive in the mail after your visit with us. Thank you!             Your Updated Medication List - Protect others around you: Learn how to safely use, store and throw away your medicines at www.disposemymeds.org.          This list is accurate as of: 4/18/17 11:06 AM.  Always use your most recent med list.                   Brand Name Dispense Instructions for use    amLODIPine 5 MG tablet    NORVASC    90 tablet    Take 1 tablet (5 mg) by mouth daily       atenolol 100 MG tablet    TENORMIN    90 tablet    Take 1 tablet (100 mg) by mouth daily       cevimeline 30 MG capsule    EVOXAC    90 capsule    Take 1 capsule (30 mg) by mouth 3 times daily       clindamycin 300 MG capsule    CLEOCIN    2 capsule    Take  by mouth. 2 capsules 1 hour prior to dental procedures       febuxostat 40 MG Tabs tablet    ULORIC    90 tablet    Take 1 tablet (40 mg) by mouth daily       hydroxychloroquine 200 MG tablet    PLAQUENIL    180 tablet    Take 2 tablets (400 mg) by mouth daily annual eye exam needed for further refills fax results to [phone]        levothyroxine 175 MCG tablet    SYNTHROID/LEVOTHROID    90 tablet    Take 1 tablet (175 mcg) by mouth daily       OMEGA-3 FISH OIL PO      Take 3 capsules by mouth daily       order for DME     1    CPAP at 12 cm water with heated humidity and mask to fit with appropriate suportive supplies. On at night off in the AM.       terazosin 5 MG capsule    HYTRIN    90 capsule    Take 1 capsule (5 mg) by mouth At Bedtime       vitamin D 28782 UNIT capsule    ERGOCALCIFEROL    12 capsule    Take 1 capsule (50,000 Units) by mouth once a week

## 2017-04-18 NOTE — LETTER
4/18/2017      RE: Angella Jasmine  [address] [facility] [id number]       Ms. Kenroy kay 71 y.o. female returns for re-evaluation and mangement of Sjogren's disease associated with arthritis.  HCIKI+, SSA+, SSB-, dsDNA-, RF+ (previously, now negative), HCV-, Cryo-; lip biopsy positive for SS. Last seen in Rheumatology in October 2016.     Has not seen eye doctor last year, will plan seeing them this Thursday (4/20/17).     Last year had some dysphagia with dry food, (i.e. Hamburger), but that has improved and eats those things with water/liquid.    Has dry eyes but the eye gtts help and does not note any dry eyes.   Has dyspnea with exertion and that has been longstanding, several years. No chest pain or wheezing.   Has joint pain in hands that is longstanding as well, several years. Has morning stiffness and notes stiffness with disuse. Pain improves with use of hands. Swells occasionally. Patient massages the hand and it improves and uses capsaicin cream which improves the pain. It does not limit her activities. She likes to sew and quilt and notices sometimes her hands will hurt after those activities.     Denies any pain currently, usually it occurs daily.   No Ibuprofen or tylenol use.   Uses Advil for headaches PRN. Does not feel that Tylenol is helpful for her headaches.     No recent gout flares as well. The usual place of gout flare was her left foot, at least two years ago.      ROS: erythema, rashes, alopecia, dysphagia, dry mouth, dry eyes, energy,  raynauds, dyspnea, eyelid dermatitis, angular chelitis, dysuria, change in urine     Medications:  Hydroxychloroquine 400 mg  Vitamin D 50,000 units once weekly  Evoxac 30 mg BID  Levothyroxine 150  Uloric 40mg daily  Amlodipine 5mg daily  Terazosin 5mg daily     PMI:  Medical-HTN, hypothyroidism, sleep apnea on CPAP, obesity, vitamin D deficiency, gout, Sjogren's, sacroiliac joint dysfunction  Surgical-bilateral partial knee arthroplasty  Injuries-none      SH:  Retired. 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 No tobacco or EtOH. Right handed.     FH:  Sister may have SS and RA.  Cancer in the family  Mother  with cancer, also had RA  Father  with colon cancer  Children are healthy  Daughter with blood clots, uterine cancer s/p hysterectomy      ROS:    +Allergy to pcn, codiene, prednisone, sulfa  Remainder of the 14 point ROS obtained and found negative.     PHYSICAL EXAM:   MELISSA 152/79 HR 63  lbs and BMI 60.35   Constitutional: Well dress and groomed, calm sitting in exam chair, cooperative, +morbid obesity, difficulty moving to table, uses a cane  Eyes: Wearing glasses. PERRLA, conjunctiva red  ENT: nl nose, hearing, lips, teeth, gums, throat;   Neck: supple. no masses appreciated.  Resp: Breathing appeared non-labored, lungs clear to auscultation without wheezing or rhonchi  CV: RRR, no murmurs, rubs or gallops, no edema  GI: BS+, obese, soft  MS:Small amount of swelling noted over first carpometacarpal joint without erythema or tenderness noted. Neck, shoulder, elbow, wrist, hand, knee, ankle, and foot joints were examined and otherwise found normal. Normal  strength. No active synovitis or deformity.   Knee: left ttp, but no swelling erythema or warmth noted, prior surgical scar noted  Skin: no rash, telangectasia, calcinosis, alopecia.  Neuro: nl cranial nerves, strength, sensation,  Psych: nl judgement, orientation, memory, affect    Laboratory:    Component      Latest Ref Rng & Units 2017   Color Urine       Yellow   Appearance Urine       Slightly Cloudy   Glucose Urine      NEG mg/dL Negative   Bilirubin Urine      NEG Negative   Ketones Urine      NEG mg/dL Negative   Specific Gravity Urine      1.003 - 1.035 1.020   Blood Urine      NEG Negative   pH Urine      5.0 - 7.0 pH 5.0   Protein Albumin Urine      NEG mg/dL Negative   Urobilinogen mg/dL      0.0 - 2.0 mg/dL 0.0   Nitrite  Urine      NEG Positive (A)   Leukocyte Esterase Urine      NEG Small (A)   Source       Midstream Urine   WBC Urine      0 - 2 /HPF 30 (H)   RBC Urine      0 - 2 /HPF 4 (H)   Bacteria Urine      NEG /HPF Many (A)   Mucous Urine      NEG /LPF Present (A)   Hyaline Casts      0 - 2 /LPF 5 (H)   WBC      4.0 - 11.0 10e9/L 4.1   RBC Count      3.8 - 5.2 10e12/L 4.57   Hemoglobin      11.7 - 15.7 g/dL 13.3   Hematocrit      35.0 - 47.0 % 39.5   MCV      78 - 100 fl 86   MCH      26.5 - 33.0 pg 29.1   MCHC      31.5 - 36.5 g/dL 33.7   RDW      10.0 - 15.0 % 13.8   Platelet Count      150 - 450 10e9/L 138 (L)   Creatinine      0.52 - 1.04 mg/dL 0.97   GFR Estimate      >60 mL/min/1.7m2 56 (L)   GFR Estimate If Black      >60 mL/min/1.7m2 68   Protein Random Urine      g/L 0.37   Protein Total Urine g/gr Creatinine      0 - 0.2 g/g Cr 0.14   Albumin      3.4 - 5.0 g/dL 3.8   ALT      0 - 50 U/L 21   AST      0 - 45 U/L 19   Creatinine Urine      mg/dL 270       Impression and Plan:     Sjogren's Disease-associated with mild arthritis, mild lymphopenia, and sicca. Appears stable today. Due for an eye exam and was advised to schedule this. Will plan to keep her on the same regimen (Evoxac and hydroxychloroquine) and return to clinic in 1 year. Repeat RF, complement levels, cryoglobulin, AST/ALT, UA, urine protein, CBC, and protein electrophoresis are pending.    Vitamin D insufficiency-  Pending Vitamin D level today will adjust dose.    Obesity- trying to lose weight with dietary changes and increase in activity. Advised to continue to lose weight.     Plan RTC in 1 year.     Patient seen and discussed with Dr. Owens. Please see staff addendum for changes to plan.     Rachana Martinez MD PGY-3  Internal Medicine       Rheumatology Attending:    This patient was interviewed and examined in the presence of the medical resident, and this note reflects our mutual impression.     Grey Owens MD  Professor of  Medicine  Director, Division of Rheumatic and Autoimmune Diseases

## 2017-04-18 NOTE — PROGRESS NOTES
Ms. Jasmine a 71 y.o. female returns for re-evaluation and mangement of Sjogren's disease associated with arthritis.  CHIKI+, SSA+, SSB-, dsDNA-, RF+ (previously, now negative), HCV-, Cryo-; lip biopsy positive for SS. Last seen in Rheumatology in October 2016.     Has not seen eye doctor last year, will plan seeing them this Thursday (4/20/17).     Last year had some dysphagia with dry food, (i.e. Hamburger), but that has improved and eats those things with water/liquid.    Has dry eyes but the eye gtts help and does not note any dry eyes.   Has dyspnea with exertion and that has been longstanding, several years. No chest pain or wheezing.   Has joint pain in hands that is longstanding as well, several years. Has morning stiffness and notes stiffness with disuse. Pain improves with use of hands. Swells occasionally. Patient massages the hand and it improves and uses capsaicin cream which improves the pain. It does not limit her activities. She likes to sew and quilt and notices sometimes her hands will hurt after those activities.     Denies any pain currently, usually it occurs daily.   No Ibuprofen or tylenol use.   Uses Advil for headaches PRN. Does not feel that Tylenol is helpful for her headaches.     No recent gout flares as well. The usual place of gout flare was her left foot, at least two years ago.      ROS: erythema, rashes, alopecia, dysphagia, dry mouth, dry eyes, energy,  raynauds, dyspnea, eyelid dermatitis, angular chelitis, dysuria, change in urine     Medications:  Hydroxychloroquine 400 mg  Vitamin D 50,000 units once weekly  Evoxac 30 mg BID  Levothyroxine 150  Uloric 40mg daily  Amlodipine 5mg daily  Terazosin 5mg daily     PMI:  Medical-HTN, hypothyroidism, sleep apnea on CPAP, obesity, vitamin D deficiency, gout, Sjogren's, sacroiliac joint dysfunction  Surgical-bilateral partial knee arthroplasty  Injuries-none     SH:  Retired. 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 No tobacco or EtOH. Right handed.     FH:  Sister may have SS and RA.  Cancer in the family  Mother  with cancer, also had RA  Father  with colon cancer  Children are healthy  Daughter with blood clots, uterine cancer s/p hysterectomy      ROS:    +Allergy to pcn, codiene, prednisone, sulfa  Remainder of the 14 point ROS obtained and found negative.     PHYSICAL EXAM:   MELISSA 152/79 HR 63  lbs and BMI 60.35   Constitutional: Well dress and groomed, calm sitting in exam chair, cooperative, +morbid obesity, difficulty moving to table, uses a cane  Eyes: Wearing glasses. PERRLA, conjunctiva red  ENT: nl nose, hearing, lips, teeth, gums, throat;   Neck: supple. no masses appreciated.  Resp: Breathing appeared non-labored, lungs clear to auscultation without wheezing or rhonchi  CV: RRR, no murmurs, rubs or gallops, no edema  GI: BS+, obese, soft  MS:Small amount of swelling noted over first carpometacarpal joint without erythema or tenderness noted. Neck, shoulder, elbow, wrist, hand, knee, ankle, and foot joints were examined and otherwise found normal. Normal  strength. No active synovitis or deformity.   Knee: left ttp, but no swelling erythema or warmth noted, prior surgical scar noted  Skin: no rash, telangectasia, calcinosis, alopecia.  Neuro: nl cranial nerves, strength, sensation,  Psych: nl judgement, orientation, memory, affect    Laboratory:    Component      Latest Ref Rng & Units 2017   Color Urine       Yellow   Appearance Urine       Slightly Cloudy   Glucose Urine      NEG mg/dL Negative   Bilirubin Urine      NEG Negative   Ketones Urine      NEG mg/dL Negative   Specific Gravity Urine      1.003 - 1.035 1.020   Blood Urine      NEG Negative   pH Urine      5.0 - 7.0 pH 5.0   Protein Albumin Urine      NEG mg/dL Negative   Urobilinogen mg/dL      0.0 - 2.0 mg/dL 0.0   Nitrite Urine      NEG Positive (A)   Leukocyte Esterase Urine      NEG Small (A)    Source       Midstream Urine   WBC Urine      0 - 2 /HPF 30 (H)   RBC Urine      0 - 2 /HPF 4 (H)   Bacteria Urine      NEG /HPF Many (A)   Mucous Urine      NEG /LPF Present (A)   Hyaline Casts      0 - 2 /LPF 5 (H)   WBC      4.0 - 11.0 10e9/L 4.1   RBC Count      3.8 - 5.2 10e12/L 4.57   Hemoglobin      11.7 - 15.7 g/dL 13.3   Hematocrit      35.0 - 47.0 % 39.5   MCV      78 - 100 fl 86   MCH      26.5 - 33.0 pg 29.1   MCHC      31.5 - 36.5 g/dL 33.7   RDW      10.0 - 15.0 % 13.8   Platelet Count      150 - 450 10e9/L 138 (L)   Creatinine      0.52 - 1.04 mg/dL 0.97   GFR Estimate      >60 mL/min/1.7m2 56 (L)   GFR Estimate If Black      >60 mL/min/1.7m2 68   Protein Random Urine      g/L 0.37   Protein Total Urine g/gr Creatinine      0 - 0.2 g/g Cr 0.14   Albumin      3.4 - 5.0 g/dL 3.8   ALT      0 - 50 U/L 21   AST      0 - 45 U/L 19   Creatinine Urine      mg/dL 270       Impression and Plan:     Sjogren's Disease-associated with mild arthritis, mild lymphopenia, and sicca. Appears stable today. Due for an eye exam and was advised to schedule this. Will plan to keep her on the same regimen (Evoxac and hydroxychloroquine) and return to clinic in 1 year. Repeat RF, complement levels, cryoglobulin, AST/ALT, UA, urine protein, CBC, and protein electrophoresis are pending.    Vitamin D insufficiency-  Pending Vitamin D level today will adjust dose.    Obesity- trying to lose weight with dietary changes and increase in activity. Advised to continue to lose weight.     Plan RTC in 1 year.     Patient seen and discussed with Dr. Owens. Please see staff addendum for changes to plan.     Rachana Martinez MD PGY-3  Internal Medicine       Rheumatology Attending:    This patient was interviewed and examined in the presence of the medical resident, and this note reflects our mutual impression.   Grey Owens MD  Professor of Medicine  Director, Division of Rheumatic and Autoimmune Diseases

## 2017-04-18 NOTE — NURSING NOTE
"Chief Complaint   Patient presents with     RECHECK     Follow up for sjogren's        Initial /79  Pulse 63  Ht 1.6 m (5' 3\")  Wt (!) 154.2 kg (340 lb)  SpO2 96%  BMI 60.23 kg/m2 Estimated body mass index is 60.23 kg/(m^2) as calculated from the following:    Height as of this encounter: 1.6 m (5' 3\").    Weight as of this encounter: 154.2 kg (340 lb).  Medication Reconciliation: complete   Allyson Delcid CMA    "

## 2017-04-19 ENCOUNTER — OFFICE VISIT (OUTPATIENT)
Dept: SLEEP MEDICINE | Facility: CLINIC | Age: 72
End: 2017-04-19
Attending: INTERNAL MEDICINE
Payer: MEDICARE

## 2017-04-19 VITALS
HEART RATE: 60 BPM | DIASTOLIC BLOOD PRESSURE: 66 MMHG | RESPIRATION RATE: 20 BRPM | BODY MASS INDEX: 51.91 KG/M2 | HEIGHT: 63 IN | WEIGHT: 293 LBS | SYSTOLIC BLOOD PRESSURE: 155 MMHG | OXYGEN SATURATION: 99 %

## 2017-04-19 DIAGNOSIS — G47.33 OSA (OBSTRUCTIVE SLEEP APNEA): Primary | ICD-10-CM

## 2017-04-19 LAB
ALBUMIN SERPL ELPH-MCNC: 4.1 G/DL (ref 3.7–5.1)
ALPHA1 GLOB SERPL ELPH-MCNC: 0.3 G/DL (ref 0.2–0.4)
ALPHA2 GLOB SERPL ELPH-MCNC: 0.8 G/DL (ref 0.5–0.9)
B-GLOBULIN SERPL ELPH-MCNC: 0.9 G/DL (ref 0.6–1)
GAMMA GLOB SERPL ELPH-MCNC: 1 G/DL (ref 0.7–1.6)
M PROTEIN SERPL ELPH-MCNC: 0 G/DL
PROT PATTERN SERPL ELPH-IMP: NORMAL

## 2017-04-19 PROCEDURE — 99211 OFF/OP EST MAY X REQ PHY/QHP: CPT | Mod: ZF

## 2017-04-19 NOTE — MR AVS SNAPSHOT
After Visit Summary   4/19/2017    Angella Jasmine    MRN: [id number]           Patient Information     Date Of Birth          1945        Visit Information        Provider Department      4/19/2017 3:00 PM Jacquelin Maurice MD [facility], [place], Sleep Study        Today's Diagnoses     SG (obstructive sleep apnea) Severe    -  1      Care Instructions    Sleep on sides with head of bed elevated, follow up in three months with machine to re check download  In meantime also do research on dental appliances and if they are covered by your insurance.          Keep an eye on blood pressure and follow up with Dr Askew if remains elevated.  Oral Appliance  What is oral appliance therapy?  An oral appliance is a small acrylic device that fits over the upper and lower teeth or tongue (similar to an orthodontic retainer or a mouth guard). This device slightly advances the lower jaw or tongue, which moves the base of the tongue forward, opens the airway, improves breathing and can effectively treat snoring and obstructive sleep apnea sleep apnea. The appliance is fabricated and customized by a qualified dentist with experience in treating snoring and sleep apnea. Oral appliances are usually well tolerated and have relatively high compliance by patients1, 2, 3.  When is an oral appliance indicated?  Oral appliance therapy is recommended as a first-line treatment for patients with primary snoring, mild sleep apnea, and for patients with moderate sleep apnea who prefer appliance therapy to use of CPAP4, 5. Severity of sleep apnea is determined by sleep testing and is based on the number of respiratory events per hour of sleep.   How successful is oral appliance therapy?  The success rate of oral appliance therapy in patients with mild sleep apnea is 75-80% while in patients with moderate sleep apnea it is 50-70%. The chance of success in patients with severe sleep apnea is 40-50%. The research also shows that  oral appliances have a beneficial effect on the cardiovascular health of SG patients at the same magnitude as CPAP therapy7.  Oral appliances should be a second-line treatment in cases of severe sleep apnea, but if not completely successful then a combination therapy utilizing CPAP plus oral appliance therapy may be effective. Oral appliances tend to be effective in a broad range of patients although studies show that the patients who have the highest success are females, younger patients, those with milder disease, and less severe obesity. 3, 6.   The chances of success are lower in patients who have more severe SG, are older, and those who are morbidly obese.     Example of an oral appliance   Finding a dentist that practices dental sleep medicine  Specific training is available through the American Academy of Dental Sleep Medicine for dentists interested in working in the field of sleep. To find a dentist who is educated in the field of sleep and the use of oral appliances, near you, visit the Web site of the American Academy of Dental Sleep Medicine; also see   http://www.accpstorage.org/newOrganization/patients/oralAppliances.pdf  To search for a dentist certified in these practices:  Http://aadsm.org/FindADentist.aspx?1  1. Lakeisha, et al. Objectively measured vs self-reported compliance during oral appliance therapy for sleep-disordered breathing. Chest 2013; 144(5): 4680-9933.  2. Elias et al. Objective measurement of compliance during oral appliance therapy for sleep-disordered breathing. Thorax 2013; 68(1): 91-96.  3. Ryan, et al. Mandibular advancement devices in 620 men and women with SG and snoring: tolerability and predictors of treatment success. Chest 2004; 125: 2861-0106.  4. Saud et al. Oral appliances for snoring and SG: a review. Sleep 2006; 29: 244-262.  5. Angelina et al. Oral appliance treatment for SG: an update. J Clin Sleep Med 2014; 10(2): 215-227.  6. Aysha  et al. Predictors of OSAH treatment outcome. J Dent Res 2007; 86: 2443-0765.          Dental appliance resources recommended by [facility]        [facility]   Sleep Medicine [facility]   Scooter Flores DDS, MS     [facility]  [address] [place] [address] [address]  [place] [address]   Appointments: [phone]  Fax: [phone]   [email or web address]   Dental and Oral Surgery Clinic   Rajat Rodas DDS   [address]   [place] [address]   Appointments: [phone]   [email or web address]/   [email or web address]       Snoring and Sleep Apnea   Dental Treatment Center   Nelson Olivas DDS   [address]   [address]   [place] [address]   Appointments: [phone]   Fax: [phone]   "[employer]  Judson Urbina DDS- [facility] medicare  [address], [place] [address]  [address], [place] [address]  [phone]; [phone] (fax)  [employer]    [facility]  Baldemar Escalante DDS  [facility] [place]  [address]  [phone]  [place] [address]  [facility]   [facility]   Rajat Rodas DDS   [facility]   [address],   [address]   [place] [address]   Appointments: [phone]   Fax: [phone]     [place] Office   Rob Cole DDS, MS   [DME Medicare]  [facility]   [address]   [address]   [place] [address]   Appointments: [phone]   Fax: [phone]   Dr. Cole accepts Medicaid patients.     [facility]  Phil Apple DDS  [DME Medicare]  [address]  [place] [address]  Appointments [phone]  Fax: [phone]    +[facility]   [facility]     Dahlia Ghosh DDS, PhD, [facility]    [facility]   [address]   [address]   [place] [address]   Appointments: [phone]   Fax: [phone]     [place] Office   [facility]   [address]   [address]   [place] [address]   Appointments: [phone]   Fax: [phone]     [facility]  Vickie Corea DDS  [facility]  [address] [place], [place] [address] [phone] (Office)   [phone] (Fax    If you wish to choose your own dental sleep dentist, you may identify a provider close to you: http://www.aadsm.org/FindADentist.[email or web address]    Your BMI is Body mass index is 60.35 kg/(m^2).  Weight management is a personal decision.  If you are interested in exploring weight loss strategies, the following discussion covers the approaches that may be successful. Body mass index (BMI) is one way to tell whether you are at a healthy weight, overweight, or obese. It measures your weight in relation to your height.  A BMI of 18.5 to 24.9 is in the healthy range. A person with a BMI of 25 to 29.9 is considered overweight, and someone with a BMI of 30 or greater is considered obese. More than two-thirds of American adults are considered overweight or obese.  Being overweight or obese increases the risk for further weight gain. Excess weight may lead to heart disease and diabetes.  Creating and following plans for healthy eating and physical activity may help you improve your health.  Weight control is part of healthy lifestyle and includes exercise, emotional health, and healthy eating habits. Careful eating habits lifelong are the mainstay of weight control. Though there are significant health benefits from weight loss, long-term weight loss with diet alone may be very difficult to achieve- studies show long-term success with dietary management in less than 10% of people. Attaining a healthy weight may be especially difficult to achieve in those with severe obesity. In  some cases, medications, devices and surgical management might be considered.  What can you do?  If you are overweight or obese and are interested in methods for weight loss, you should discuss this with your provider.     Consider reducing daily calorie intake by 500 calories.     Keep a food journal.     Avoiding skipping meals, consider cutting portions instead.    Diet combined with exercise helps maintain muscle while optimizing fat loss. Strength training is particularly important for building and maintaining muscle mass. Exercise helps reduce stress, increase energy, and improves fitness. Increasing exercise without diet control, however, may not burn enough calories to loose weight.       Start walking three days a week 10-20 minutes at a time    Work towards walking thirty minutes five days a week     Eventually, increase the speed of your walking for 1-2 minutes at time    In addition, we recommend that you review healthy lifestyles and methods for weight loss available through the National Institutes of Health patient information sites:  http://win.niddk.nih.gov/publications/index.htm    And look into health and wellness programs that may be available through your health insurance provider, employer, local community center, or gregory club.    Weight management plan: Patient was referred to their PCP to discuss a diet and exercise plan.            Follow-ups after your visit        Follow-up notes from your care team     Return in about 3 months (around 7/19/2017).      Your next 10 appointments already scheduled     Jul 20, 2017 11:30 AM CDT   Return Sleep Patient with Jacquelin Maurice MD   [facility] [place], Sleep Study ([facility])    [address] [place] [id number]   [phone]            Apr 10, 2018  8:30 AM CDT   Lab with  LAB    Health Lab ([facility])    [address]  "[id number] [phone]            Apr 10, 2018  9:30 AM CDT   (Arrive by 9:15 AM)   Return Visit with Grey Owens MD   [facility] ([facility])    [address] [facility] [id number] [phone]              Who to contact     If you have questions or need follow up information about today's clinic visit or your schedule please contact [facility] directly at [phone].  Normal or non-critical lab and imaging results will be communicated to you by [employer], letter or phone within 4 business days after the clinic has received the results. If you do not hear from us within 7 days, please contact the clinic through [employer] or phone. If you have a critical or abnormal lab result, we will notify you by phone as soon as possible.  Submit refill requests through [employer] or call your pharmacy and they will forward the refill request to us. Please allow 3 business days for your refill to be completed.          Additional Information About Your Visit        [employer] Information     [employer] gives you secure access to your electronic health record. If you see a primary care provider, you can also send messages to your care team and make appointments. If you have questions, please call your primary care clinic.  If you do not have a primary care provider, please call [phone] and they will assist you.        Care EveryWhere ID     This is your Care EveryWhere ID. This could be used by other organizations to access your [facility] medical records  [id number]        Your Vitals Were     Pulse Respirations Height Pulse Oximetry BMI (Body Mass Index)       60 20 1.6 m (5' 3\") 99% 60.35 kg/m2        Blood Pressure from Last 3 Encounters:   04/19/17 155/66   04/18/17 152/79   03/08/17 135/47    Weight from Last 3 Encounters:   04/19/17 (!) 154.5 kg (340 lb 11.2 oz)   04/18/17 (!) 154.2 kg (340 lb)   03/08/17 (!) 152.4 kg (336 lb)            "   Today, you had the following     No orders found for display         Today's Medication Changes          These changes are accurate as of: 4/19/17 11:59 PM.  If you have any questions, ask your nurse or doctor.               These medicines have changed or have updated prescriptions.        Dose/Directions    levothyroxine 175 MCG tablet   Commonly known as:  SYNTHROID/LEVOTHROID   This may have changed:  how much to take   Used for:  Need for prophylactic vaccination with tetanus-diphtheria (TD), Other screening mammogram, Unspecified hypothyroidism, Screening for lipoid disorders        Dose:  175 mcg   Take 1 tablet (175 mcg) by mouth daily   Quantity:  90 tablet   Refills:  3                Primary Care Provider Office Phone # Fax #    Mark Askew -[phone]       [facility] [place] [address]  [place] [facility] [address]        Thank you!     Thank you for choosing [facility] [place], SLEEP STUDY  for your care. Our goal is always to provide you with excellent care. Hearing back from our patients is one way we can continue to improve our services. Please take a few minutes to complete the written survey that you may receive in the mail after your visit with us. Thank you!             Your Updated Medication List - Protect others around you: Learn how to safely use, store and throw away your medicines at www.disposemymeds.org.          This list is accurate as of: 4/19/17 11:59 PM.  Always use your most recent med list.                   Brand Name Dispense Instructions for use    amLODIPine 5 MG tablet    NORVASC    90 tablet    Take 1 tablet (5 mg) by mouth daily       atenolol 100 MG tablet    TENORMIN    90 tablet    Take 1 tablet (100 mg) by mouth daily       cevimeline 30 MG capsule    EVOXAC    90 capsule    Take 1 capsule (30 mg) by mouth 3 times daily       clindamycin 300 MG capsule    CLEOCIN    2 capsule    Take  by mouth. 2 capsules 1 hour prior to dental procedures        febuxostat 40 MG Tabs tablet    ULORIC    90 tablet    Take 1 tablet (40 mg) by mouth daily       hydroxychloroquine 200 MG tablet    PLAQUENIL    180 tablet    Take 2 tablets (400 mg) by mouth daily annual eye exam needed for further refills fax results to [phone]       levothyroxine 175 MCG tablet    SYNTHROID/LEVOTHROID    90 tablet    Take 1 tablet (175 mcg) by mouth daily       OMEGA-3 FISH OIL PO      Take 3 capsules by mouth daily       order for DME     1    CPAP at 12 cm water with heated humidity and mask to fit with appropriate suportive supplies. On at night off in the AM.       terazosin 5 MG capsule    HYTRIN    90 capsule    Take 1 capsule (5 mg) by mouth At Bedtime       vitamin D 38536 UNIT capsule    ERGOCALCIFEROL    12 capsule    Take 1 capsule (50,000 Units) by mouth once a week

## 2017-04-19 NOTE — PATIENT INSTRUCTIONS
Sleep on sides with head of bed elevated, follow up in three months with machine to re check download  In meantime also do research on dental appliances and if they are covered by your insurance.          Keep an eye on blood pressure and follow up with Dr Askew if remains elevated.  Oral Appliance  What is oral appliance therapy?  An oral appliance is a small acrylic device that fits over the upper and lower teeth or tongue (similar to an orthodontic retainer or a mouth guard). This device slightly advances the lower jaw or tongue, which moves the base of the tongue forward, opens the airway, improves breathing and can effectively treat snoring and obstructive sleep apnea sleep apnea. The appliance is fabricated and customized by a qualified dentist with experience in treating snoring and sleep apnea. Oral appliances are usually well tolerated and have relatively high compliance by patients1, 2, 3.  When is an oral appliance indicated?  Oral appliance therapy is recommended as a first-line treatment for patients with primary snoring, mild sleep apnea, and for patients with moderate sleep apnea who prefer appliance therapy to use of CPAP4, 5. Severity of sleep apnea is determined by sleep testing and is based on the number of respiratory events per hour of sleep.   How successful is oral appliance therapy?  The success rate of oral appliance therapy in patients with mild sleep apnea is 75-80% while in patients with moderate sleep apnea it is 50-70%. The chance of success in patients with severe sleep apnea is 40-50%. The research also shows that oral appliances have a beneficial effect on the cardiovascular health of SG patients at the same magnitude as CPAP therapy7.  Oral appliances should be a second-line treatment in cases of severe sleep apnea, but if not completely successful then a combination therapy utilizing CPAP plus oral appliance therapy may be effective. Oral appliances tend to be effective in a broad  range of patients although studies show that the patients who have the highest success are females, younger patients, those with milder disease, and less severe obesity. 3, 6.   The chances of success are lower in patients who have more severe SG, are older, and those who are morbidly obese.     Example of an oral appliance   Finding a dentist that practices dental sleep medicine  Specific training is available through the American Academy of Dental Sleep Medicine for dentists interested in working in the field of sleep. To find a dentist who is educated in the field of sleep and the use of oral appliances, near you, visit the Web site of the American Academy of Dental Sleep Medicine; also see   http://www.accpstorage.org/newOrganization/patients/oralAppliances.pdf  To search for a dentist certified in these practices:  Http://aadsm.org/FindADentist.aspx?[email or web address]  1. Lakeisha et al. Objectively measured vs self-reported compliance during oral appliance therapy for sleep-disordered breathing. Chest 2013; 144(5): 6999-2719.  2. Elias et al. Objective measurement of compliance during oral appliance therapy for sleep-disordered breathing. Thorax 2013; 68(1): 91-96.  3. Ryan, et al. Mandibular advancement devices in 620 men and women with SG and snoring: tolerability and predictors of treatment success. Chest 2004; 125: 2821-7623.  4. Saud, et al. Oral appliances for snoring and SG: a review. Sleep 2006; 29: 244-262.  5. Angelina, et al. Oral appliance treatment for SG: an update. J Clin Sleep Med 2014; 10(2): 215-227.  6. Aysha et al. Predictors of OSAH treatment outcome. J Dent Res 2007; 86: 4392-0603.          Dental appliance resources recommended by [facility]   Sleep Medicine [facility]   Scooter Flores DDS, [facility] Professional [address] [address]   Appointments: [phone]  Fax:  [phone]   [email or web address]   [facility]   Rajat Rodas DDS   [address]   [place] [address]   Appointments: [phone]   [email or web address]   [email or web address]       Snoring and Sleep Apnea   [facility]   Nelson Olivas DDS   [address]   [address]   [place] [address]   Appointments: [phone]   Fax: [phone]   [email or web address]       [facility]  Judson Urbina, ZANDRA- takes medicare  [address], [place] [address]  [address], [place] [address]  [phone]; [phone] (fax)  [employer]    [facility]  Baldemar Escalante DDS  [facility]  [address]  [phone]  [place] [address]  [facility]   [facility]   Rajat Rodas DDS   [facility]   [address],   [address]   [place] [address]   Appointments: [phone]   Fax: [phone]     [place] [facility]   Rob Cole DDS, MS   [DME Medicare]  [facility]   [address]   [address]   [place] [address]   Appointments: [phone]   Fax: [phone]   Dr. Cole accepts Medicaid patients.     [facility]  Phil Apple DDS  [DME Medicare]  [address]  [place] [address]  Appointments [phone]  Fax: [phone]    +[facility]   [facility]     Dahlia Ghosh DDS, PhD, MS   [place] [facility]   [facility]   [address],   [address]   [place] [address]   Appointments: [phone]   Fax: [phone]     [facility] [address]   [place] [address]   Appointments: [phone]   Fax: [phone]     [facility]  Vickie Corea DDS  [facility]  [address]  [place] [address]  [phone] (Office)   [phone] (Fax    If you wish to choose your own dental sleep dentist, you may identify a provider close to you: http://www.aadsm.org/FindADentist.[email or web address]    Your BMI is Body mass index is 60.35 kg/(m^2).  Weight management is a personal decision.  If you are interested in exploring weight loss strategies, the following discussion covers the approaches that may be successful. Body mass index (BMI) is one way to tell whether you are at a healthy weight, overweight, or obese. It measures your weight in relation to your height.  A BMI of 18.5 to 24.9 is in the healthy range. A person with a BMI of 25 to 29.9 is considered overweight, and someone with a BMI of 30 or greater is considered obese. More than two-thirds of American adults are considered overweight or obese.  Being overweight or obese increases the risk for further weight gain. Excess weight may lead to heart disease and diabetes.  Creating and following plans for healthy eating and physical activity may help you improve your health.  Weight control is part of healthy lifestyle and includes exercise, emotional health, and healthy eating habits. Careful eating habits lifelong are the mainstay of weight control. Though there are significant health benefits from weight loss, long-term weight loss with diet alone may be very difficult to achieve- studies show long-term success with dietary management in less than 10% of people. Attaining a healthy weight may be especially difficult to achieve in those with severe obesity. In some cases, medications, devices and surgical management might be considered.  What can you do?  If you are overweight or obese and are interested in methods for weight loss, you should discuss this with your provider.     Consider reducing daily calorie intake by 500 calories.     Keep a food journal.     Avoiding skipping meals, consider cutting portions  instead.    Diet combined with exercise helps maintain muscle while optimizing fat loss. Strength training is particularly important for building and maintaining muscle mass. Exercise helps reduce stress, increase energy, and improves fitness. Increasing exercise without diet control, however, may not burn enough calories to loose weight.       Start walking three days a week 10-20 minutes at a time    Work towards walking thirty minutes five days a week     Eventually, increase the speed of your walking for 1-2 minutes at time    In addition, we recommend that you review healthy lifestyles and methods for weight loss available through the National Institutes of Health patient information sites:  http://win.niddk.nih.gov/publications/index.htm    And look into health and wellness programs that may be available through your health insurance provider, employer, local community center, or gregory club.    Weight management plan: Patient was referred to their PCP to discuss a diet and exercise plan.

## 2017-04-19 NOTE — Clinical Note
CC PCP Mark Askew MD  Patient to follow up with Primary Care provider regarding elevated blood pressure.

## 2017-04-20 NOTE — PROGRESS NOTES
DATE OF SERVICE:  04/19/2017      CHIEF COMPLAINT:  Followup of sleep apnea.      HISTORY OF PRESENT ILLNESS:  Angella Jasmine is a 71-year-old female with history of obesity diagnosed with sleep apnea years ago and recently had a followup with Dr. Sanchez.  On download, a large leak was noted.  She had been using somewhat out of date supplies.  A prescription was generated for new supplies and she is here today for followup to see if leak has resolved.  She is using a nasal mask, intranasal; she finds comfortable.  In general, she finds her machine noisy, but she denies being aware of any leak issues.  She has tried to increase her sleep time somewhat.  She does get some sleepiness sometimes when she is watching TV.  Overall, [place] is 7.  She rarely drinks any alcoholic beverages.  She has about 4 coffees, usually before noon and does not take intentional naps.  No other stimulant use.  No sleep aids.  Typical bedtime is between 11 and 11:30.  She usually falls asleep quickly.  She often wakes up around 5:30, goes to the bathroom and then usually tries to fall back asleep again with some moderate success.  Typical rise time is between 7 and 7:30.  Since her last visit, she denies any new medical problems.  She has elevated blood pressure today and admits that she forgot to take her noon dose of amlodipine.  She has been gaining weight now for years.  Her  is a very good cook and she has had a challenge sticking to a diet.      Download of data from her device on the last 30 days which would be after supplies and mask change shows increased average usage, now up to 7 hours.  Compliance remains excellent with percent of days used greater than 4 hours of 100%.  Settings are the same with a min pressure of 12, max of 18, 90th percentile for pressure is essentially the same as previous at 16.6.  Leak remains elevated at 25, average AHI is similar to previous at 6.3.  Interestingly, reviewing daily details, it does  appear that usually for the last few hours of night there is less leak.  It is possible that she is sleeping on her sides at this time.  Both central and obstructive events are noted on the download.  The average obstructive index, however, is 3.8 of the 6.3.      We did discuss the use of oral appliances as an option for pressure reduction.  She does not get routine dental care, but she does not think she has any major issues that need to be resolved.  She also does already sleep with the head of the bed elevated about 10-20 degrees.  She denies any new sleep complaints.      PAST MEDICAL HISTORY, ALLERGIES, MEDICATIONS:  Reviewed.      PHYSICAL EXAMINATION:   GENERAL:  Pleasant obese female, no distress.   VITAL SIGNS:  Blood pressure 155/66, pulse 60, respirations 20, O2 saturation 99%.  Weight is 340 pounds, for a body mass index of 60.3.        Previous sleep studies were briefly reviewed again with the patient.  First study was done 01/2007.  Sleep efficiency poor with 63%, weight at that time was 255 pounds.  Her apnea-hypopnea index was in the severe range at 43.  She subsequently underwent a titration which failed due to lack of sleep, second titration was attempted and pressure of 12 was identified.  No further sleep studies.      ASSESSMENT:  A 71-year-old female with severe obstructive sleep apnea has gained a significant amount of weight from her original sleep study, pressure requirements have increased.  She continues to have leak that may be associated with increased pressure requirements and incompletely treated sleep apnea.      PLAN:  We discussed that sometimes we tolerate a residual mildly elevated AHI if the patient's symptoms are doing well which she is doing well.  However, in an effort to try to optimize treatment, we could consider using an oral appliance to stabilize the jaw and open airway, so lower pressures can be effective.  We also discussed increasing the time that she sleeps on her  sides and working on weight control.  The patient is going to continue to work on position restriction and weight control and do some research on oral appliances in the meantime.  We will reassess that download in 3 months and consider oral appliance therapy at that time if needed.  The patient would like to avoid repeat sleep studies.  She is agreeable with this plan.  I did try to reassure her that she is getting near optimal treatment currently with her current settings so she should not worry about these numbers. Patient instructed to monitor blood pressure and follow up with PCP.     Twenty-five minutes spent with the patient, greater than 50% spent in counseling and coordinating care.         RD MAXWELL MD             D: 2017 16:05   T: 2017 21:31   MT: KADY      Name:     ALONDRA OFRREST   MRN:      [id number]        Account:      [id number]   :      1945           Visit Date:   2017      Document: [id number]

## 2017-04-21 LAB
DEPRECATED CALCIDIOL+CALCIFEROL SERPL-MC: NORMAL UG/L (ref 20–75)
VITAMIN D2 SERPL-MCNC: 66 UG/L
VITAMIN D3 SERPL-MCNC: <5 UG/L

## 2017-04-24 LAB — CRYOGLOB SER QL: ABNORMAL %

## 2017-07-20 ENCOUNTER — OFFICE VISIT (OUTPATIENT)
Dept: SLEEP MEDICINE | Facility: CLINIC | Age: 72
End: 2017-07-20
Attending: INTERNAL MEDICINE
Payer: MEDICARE

## 2017-07-20 VITALS
SYSTOLIC BLOOD PRESSURE: 152 MMHG | RESPIRATION RATE: 18 BRPM | DIASTOLIC BLOOD PRESSURE: 60 MMHG | OXYGEN SATURATION: 97 % | BODY MASS INDEX: 51.91 KG/M2 | HEIGHT: 63 IN | WEIGHT: 293 LBS | HEART RATE: 62 BPM

## 2017-07-20 DIAGNOSIS — G47.33 OSA (OBSTRUCTIVE SLEEP APNEA): Primary | ICD-10-CM

## 2017-07-20 DIAGNOSIS — E66.01 MORBID OBESITY DUE TO EXCESS CALORIES (H): ICD-10-CM

## 2017-07-20 PROCEDURE — 99211 OFF/OP EST MAY X REQ PHY/QHP: CPT | Mod: ZF

## 2017-07-20 NOTE — NURSING NOTE
"Chief Complaint   Patient presents with     CPAP Follow Up       Initial /60  Pulse 62  Resp 18  Ht 1.6 m (5' 3\")  Wt (!) 157.4 kg (347 lb)  SpO2 97%  BMI 61.47 kg/m2 Estimated body mass index is 61.47 kg/(m^2) as calculated from the following:    Height as of this encounter: 1.6 m (5' 3\").    Weight as of this encounter: 157.4 kg (347 lb).  Medication Reconciliation: complete     HERBERT Dobbs        "

## 2017-07-20 NOTE — PATIENT INSTRUCTIONS
Plan ahead for adjusting your diet during travel  Follow up with PCP re ear pain if it continues  1.  Continue CPAP every night for all hours that you are sleeping.  If you nap use CPAP.  As always, try to get at least 8 hours of sleep or more each day, and avoid sleep deprivation. Avoid alcohol.    2.  Reasons that you might need a change to your pressure therapy would be weight gain or loss, waking having inadvertently removed your CPAP overnight, having previously felt refreshed by sleep with CPAP use and now waking un-refreshed, and return of daytime sleepiness. Also, the development of new medical problems can sometimes affect breathing at night-heart failure, stroke, medications such as narcotics.    3.  Please bring CPAP with you if you are hospitalized.  If anticipating surgery be sure to discuss with your surgeon that you have sleep apnea and use PAP therapy.      4.  Maintain your equipment as recommended which includes routine cleaning and replacement of supplies.  Call DME for any questions regarding supplies or maintenance.      5.  Do not drive on engage in potentially dangerous activities if feeling sleepy.    6.  Please see me again in 9 months and bring your machine and card to your follow-up visit.      [facility] DME and cpap specialist Missy [phone]  [facility] [phone]    [facility] [phone], CPAP specialist [phone]  [facility] [phone]    [facility], [facility] [phone]    [facility]  Elodia Torres [phone]  [facility] [place] [facility] [place] [phone]  [facility] D[facility] phone [phone]         Tips for your CPAP and BIPAP use-    Mask fitting tips  Mask fitting exercise:    To improve your mask seal and your mobility at night, put mask on and secure in place.  Lie down  in bed with full pressure and roll to one side, adjust headgear while in that position to eliminate any leaks. Repeat process rolling to other side.     The mask seal does not have to be perfect:   CPAP machines are designed to make up for small leaks. However, you will not tolerate leaks blowing in your eyes so you will need to adjust.   Any leak should only be near or at the bottom of the mask.  We expect your mask to leak slightly at night.    Do not over-tighten the headgear straps, tighter IS NOT better, we expect minimal leak.    First try re-positioning the mask or headgear before tightening the headgear straps.  Mask leaks are expected due to changing sleeping positions. Try pulling the mask away from your skin allowing the cushion to re-inflate will minimize the leak.  If you struggle for a good fit, try turning the CPAP off and then readjust the mask by pulling it away from your face and then turning back on the CPAP.        Humidifier tips  Humidifiers can be adjusted to increase or decrease the amount of moisture according to your comfort level. You may need to adjust this frequently at first, but then might only change it with seasonal weather changes.     Try INCREASING the humidity if:  You experience a dry, irritated nasal passage or throat.  You have a runny, drippy nose or sneezing fits after using CPAP.  You experience nasal congestion during or after CPAP use.    Try DECREASING the humidity if:  You have excessive condensation or  rain out  in the tubing or mask.  Otherwise keep the tubing warm during the night by running it underneath the blankets or pillow.      Clinic visit after initial CPAP and BIPAP set-up   Bring your equipment with you to your 4 week follow up clinic visit.  We will be extracting your data from the machine.        Travel  Always take your equipment with you.  If you fly with your equipment bring it on with you as a carry on.  Medical equipment does not count as a carry  on.    If you travel international the machines take 110-240.  The only adapter needed is the adapter that will fit into the receptacle (outlet).    You may also want to bring an extension cord as many hotel rooms have limited outlets at the bedside.  Do not travel with water in your humidifier chamber.     Cleaning and Maintenance Guidelines    Equipment Frequency Cleaning Method   Mask First Day    Daily      Weekly Soak mask in hot soapy water for 30 minutes, rinse and air dry.  Wipe nasal cushion with a hot soapy (Ivory, baby shampoo) cloth and rinse.  Baby wipes may also be used.  Do not use anti-bacterial soaps,Nell  liquid soap, rubbing alcohol, bleach or ammonia.  Wash frame in hot soapy water (Ivory, baby shampoo) rinse and let air dry   Headgear Biweekly Wash in hot soapy water, rinse and air dry   Reusable Gray Filter Weekly Wash in hot soapy water, rinse, put in towel squeeze moisture out, let air dry   Disposable White Filter Check Weekly Replace when brown or gray in color; at least every 2 to 3 months   Humidifier Chamber Daily    Weekly Empty distilled water from humidifier and let air dry    Hand wash in hot soapy water, rinse and air dry   Tubing Weekly Wash in hot soapy water, rinse and let air dry   Mask, Tubing and Humidifier Chamber As needed Disinfect: Soak in 1 part vinegar to 3 parts hot water for 30 minutes, rinse well and air dry  Not the material headgear        MASK AND SUPPLY REORDERING  Reminder: Most insurance companies will allow for a new mask, headgear, tubing, and reusable gray filter every six months.  Disposable white ultra-fine filters are covered monthly.    EQUIPMENT NEEDS  Please call our CPAP specialist with any equipment problems, questions or needs.    HOME AND SAFETY INSTRUCTIONS    Do not use frayed or cracked electrical cords, multi plug adaptors, or switched receptacles    Do not immerse electrical equipment into water    Assure that electrical cords do not become a  tripping hazard      Your BMI is Body mass index is 61.47 kg/(m^2).  Weight management is a personal decision.  If you are interested in exploring weight loss strategies, the following discussion covers the approaches that may be successful. Body mass index (BMI) is one way to tell whether you are at a healthy weight, overweight, or obese. It measures your weight in relation to your height.  A BMI of 18.5 to 24.9 is in the healthy range. A person with a BMI of 25 to 29.9 is considered overweight, and someone with a BMI of 30 or greater is considered obese. More than two-thirds of American adults are considered overweight or obese.  Being overweight or obese increases the risk for further weight gain. Excess weight may lead to heart disease and diabetes.  Creating and following plans for healthy eating and physical activity may help you improve your health.  Weight control is part of healthy lifestyle and includes exercise, emotional health, and healthy eating habits. Careful eating habits lifelong are the mainstay of weight control. Though there are significant health benefits from weight loss, long-term weight loss with diet alone may be very difficult to achieve- studies show long-term success with dietary management in less than 10% of people. Attaining a healthy weight may be especially difficult to achieve in those with severe obesity. In some cases, medications, devices and surgical management might be considered.  What can you do?  If you are overweight or obese and are interested in methods for weight loss, you should discuss this with your provider.     Consider reducing daily calorie intake by 500 calories.     Keep a food journal.     Avoiding skipping meals, consider cutting portions instead.    Diet combined with exercise helps maintain muscle while optimizing fat loss. Strength training is particularly important for building and maintaining muscle mass. Exercise helps reduce stress, increase energy,  and improves fitness. Increasing exercise without diet control, however, may not burn enough calories to loose weight.       Start walking three days a week 10-20 minutes at a time    Work towards walking thirty minutes five days a week     Eventually, increase the speed of your walking for 1-2 minutes at time    In addition, we recommend that you review healthy lifestyles and methods for weight loss available through the National Institutes of Health patient information sites:  http://win.niddk.nih.gov/publications/index.htm    And look into health and wellness programs that may be available through your health insurance provider, employer, local community center, or [employer]regory club.    Weight management plan: Patient was referred to their PCP to discuss a diet and exercise plan.     Your blood pressure was checked while you were in clinic today.  Please read the guidelines below about what these numbers mean and what you should do about them.  Your systolic blood pressure is the top number.  This is the pressure when the heart is pumping.  Your diastolic blood pressure is the bottom number.  This is the pressure in between beats.  If your systolic blood pressure is less than 120 and your diastolic blood pressure is less than 80, then your blood pressure is normal. There is nothing more that you need to do about it  If your systolic blood pressure is 120-139 or your diastolic blood pressure is 80-89, your blood pressure may be higher than it should be.  You should have your blood pressure re-checked within a year by a primary care provider.  If your systolic blood pressure is 140 or greater or your diastolic blood pressure is 90 or greater, you may have high blood pressure.  High blood pressure is treatable, but if left untreated over time it can put you at risk for heart attack, stroke, or kidney failure.  You should have your blood pressure re-checked by a primary care provider within the next four weeks.

## 2017-07-20 NOTE — PROGRESS NOTES
Chief complaint: Follow-up obstructive sleep apnea    History of present illness: 71-year-old female with morbid obesity, severe obstructive sleep apnea. She is here after following for following up of an appointment in April where it is recommended that she do more effort to sleep on her sides. She had slightly elevated residual AHI on her previous CPAP downloads. During this time she was also considering whether or not to proceed with oral appliance in conjunction with CPAP. She reports today that she continues to feel that the CPAP at the current settings as effective. She does have a hard time sleeping on her sides, she is more comfortable on her back. She does sleep with the head of the bed slightly elevated. She has decided that she would not like to proceed with an oral appliance.    She notes some left intermittent ear pain that is not yet completely resolved. She's not had it evaluated. She was wondering if it could be related to CPAP. She is also reinitiated the [facility] diet. And has had some success, she reports that she's lost about 8 pounds. She is a little worried about setbacks as this has happened in the past.    Typical bedtime is to go to bed between 10:30 and 11 rise between 7 and 8 occasionally she'll have middle night awakenings where she has some difficulty falling back asleep but is not particularly bothersome. She keeps her supplies clean changing them routinely, she gets them from [facility]. [place] is normal at 3, she does not drink alcohol. No excessive caffeine use.    Downloadl data from her device in the last 30 days shows average is 6 hours and 40 minutes  Percent of today's use greater than 4 hours of 100%  Current settings for the APAP is minimum pressure of 12 maximum pressure of 18  90th percentile for pressure is 17.1 leak is 8 minutes  Average AHI 7.9  These numbers are similar to previous download, leak is less.      Past medical history, medications,  "allergies and social history reviewed.    Exam: Pleasant obese female in no distress  /60  Pulse 62  Resp 18  Ht 1.6 m (5' 3\")  Wt (!) 157.4 kg (347 lb)  SpO2 97%  BMI 61.47 kg/m2   Right ear and left ear both examined. No evidence for inflammation tympanic membranes are shiny with good light reflex.    Assessment:  71-year-old female with history of severe obstructive sleep apnea with AHI around 43 getting excellent clinical benefit and achieving good compliance with CPAP. A tolerating a residual elevated AHI. Patient declines offer of additional oral appliance.    Plan: Patient is in a continue to work on weight control with Kevin diet. We discussed the importance of having a plan prior to travel. Recommended that she look back at other issues that have derailed her in the past and make a plan in case it happens again. Also recommend that she consider elevating the head about a little bit further since she does have an adjustable bed. She should follow up with PCP re ear pain if it continues, as well as hypertension. Please see patient's actions for further details of the counseling provided today patient is to follow-up in March 2018.    Twenty-five minutes spent with patient, >50% spent counseling and coordinating care.    Jacquelin Maurice M.D.  Pulmonary/Critical Care/Sleep Medicine    The above note was dictated using voice recognition software and may include typographical errors. Please contact the author for any clarifications.    "

## 2017-07-20 NOTE — MR AVS SNAPSHOT
After Visit Summary   7/20/2017    Angella Jasmine    MRN: [id number]           Patient Information     Date Of Birth          1945        Visit Information        Provider Department      7/20/2017 11:30 AM Jacquelin Maurice MD [facility], [place], Sleep Study        Today's Diagnoses     SG (obstructive sleep apnea) Severe    -  1    Morbid obesity due to excess calories (H)          Care Instructions    Plan ahead for adjusting your diet during travel  Follow up with PCP re ear pain if it continues  1.  Continue CPAP every night for all hours that you are sleeping.  If you nap use CPAP.  As always, try to get at least 8 hours of sleep or more each day, and avoid sleep deprivation. Avoid alcohol.    2.  Reasons that you might need a change to your pressure therapy would be weight gain or loss, waking having inadvertently removed your CPAP overnight, having previously felt refreshed by sleep with CPAP use and now waking un-refreshed, and return of daytime sleepiness. Also, the development of new medical problems can sometimes affect breathing at night-heart failure, stroke, medications such as narcotics.    3.  Please bring CPAP with you if you are hospitalized.  If anticipating surgery be sure to discuss with your surgeon that you have sleep apnea and use PAP therapy.      4.  Maintain your equipment as recommended which includes routine cleaning and replacement of supplies.  Call DME for any questions regarding supplies or maintenance.      5.  Do not drive on engage in potentially dangerous activities if feeling sleepy.    6.  Please see me again in 9 months and bring your machine and card to your follow-up visit.      [facility] DME and cpap specialist Missy [phone]  [facility] [phone]    [facility] DME [phone], CPAP specialist [phone]  [facility] [phone]    [place] [facility]  Department, [facility] [phone]    [facility] DME  Elodia Torres [phone]  [facility] [place] [phone]  [facility] DME phone [phone]         Tips for your CPAP and BIPAP use-    Mask fitting tips  Mask fitting exercise:    To improve your mask seal and your mobility at night, put mask on and secure in place.  Lie down in bed with full pressure and roll to one side, adjust headgear while in that position to eliminate any leaks. Repeat process rolling to other side.     The mask seal does not have to be perfect:   CPAP machines are designed to make up for small leaks. However, you will not tolerate leaks blowing in your eyes so you will need to adjust.   Any leak should only be near or at the bottom of the mask.  We expect your mask to leak slightly at night.    Do not over-tighten the headgear straps, tighter IS NOT better, we expect minimal leak.    First try re-positioning the mask or headgear before tightening the headgear straps.  Mask leaks are expected due to changing sleeping positions. Try pulling the mask away from your skin allowing the cushion to re-inflate will minimize the leak.  If you struggle for a good fit, try turning the CPAP off and then readjust the mask by pulling it away from your face and then turning back on the CPAP.        Humidifier tips  Humidifiers can be adjusted to increase or decrease the amount of moisture according to your comfort level. You may need to adjust this frequently at first, but then might only change it with seasonal weather changes.     Try INCREASING the humidity if:  You experience a dry, irritated nasal passage or throat.  You have a runny, drippy nose or sneezing fits after using CPAP.  You experience nasal congestion during or after CPAP use.    Try DECREASING the humidity if:  You have excessive condensation or  rain out  in the tubing or mask.  Otherwise  keep the tubing warm during the night by running it underneath the blankets or pillow.      Clinic visit after initial CPAP and BIPAP set-up   Bring your equipment with you to your 4 week follow up clinic visit.  We will be extracting your data from the machine.        Travel  Always take your equipment with you.  If you fly with your equipment bring it on with you as a carry on.  Medical equipment does not count as a carry on.    If you travel international the machines take 110-240.  The only adapter needed is the adapter that will fit into the receptacle (outlet).    You may also want to bring an extension cord as many hot rooms have limited outlets at the bedside.  Do not travel with water in your humidifier chamber.     Cleaning and Maintenance Guidelines    Equipment Frequency Cleaning Method   Mask First Day    Daily      Weekly Soak mask in hot soapy water for 30 minutes, rinse and air dry.  Wipe nasal cushion with a hot soapy (Ivory, baby shampoo) cloth and rinse.  Baby wipes may also be used.  Do not use anti-bacterial soaps,Nell  liquid soap, rubbing alcohol, bleach or ammonia.  Wash frame in hot soapy water (Ivory, baby shampoo) rinse and let air dry   Headgear Biweekly Wash in hot soapy water, rinse and air dry   Reusable Gray Filter Weekly Wash in hot soapy water, rinse, put in towel squeeze moisture out, let air dry   Disposable White Filter Check Weekly Replace when brown or gray in color; at least every 2 to 3 months   Humidifier Chamber Daily    Weekly Empty distilled water from humidifier and let air dry    Hand wash in hot soapy water, rinse and air dry   Tubing Weekly Wash in hot soapy water, rinse and let air dry   Mask, Tubing and Humidifier Chamber As needed Disinfect: Soak in 1 part vinegar to 3 parts hot water for 30 minutes, rinse well and air dry  Not the material headgear        MASK AND SUPPLY REORDERING  Reminder: Most insurance companies will allow for a new mask, headgear, tubing,  and reusable gray filter every six months.  Disposable white ultra-fine filters are covered monthly.    EQUIPMENT NEEDS  Please call our CPAP specialist with any equipment problems, questions or needs.    HOME AND SAFETY INSTRUCTIONS    Do not use frayed or cracked electrical cords, multi plug adaptors, or switched receptacles    Do not immerse electrical equipment into water    Assure that electrical cords do not become a tripping hazard      Your BMI is Body mass index is 61.47 kg/(m^2).  Weight management is a personal decision.  If you are interested in exploring weight loss strategies, the following discussion covers the approaches that may be successful. Body mass index (BMI) is one way to tell whether you are at a healthy weight, overweight, or obese. It measures your weight in relation to your height.  A BMI of 18.5 to 24.9 is in the healthy range. A person with a BMI of 25 to 29.9 is considered overweight, and someone with a BMI of 30 or greater is considered obese. More than two-thirds of American adults are considered overweight or obese.  Being overweight or obese increases the risk for further weight gain. Excess weight may lead to heart disease and diabetes.  Creating and following plans for healthy eating and physical activity may help you improve your health.  Weight control is part of healthy lifestyle and includes exercise, emotional health, and healthy eating habits. Careful eating habits lifelong are the mainstay of weight control. Though there are significant health benefits from weight loss, long-term weight loss with diet alone may be very difficult to achieve- studies show long-term success with dietary management in less than 10% of people. Attaining a healthy weight may be especially difficult to achieve in those with severe obesity. In some cases, medications, devices and surgical management might be considered.  What can you do?  If you are overweight or obese and are interested in  methods for weight loss, you should discuss this with your provider.     Consider reducing daily calorie intake by 500 calories.     Keep a food journal.     Avoiding skipping meals, consider cutting portions instead.    Diet combined with exercise helps maintain muscle while optimizing fat loss. Strength training is particularly important for building and maintaining muscle mass. Exercise helps reduce stress, increase energy, and improves fitness. Increasing exercise without diet control, however, may not burn enough calories to loose weight.       Start walking three days a week 10-20 minutes at a time    Work towards walking thirty minutes five days a week     Eventually, increase the speed of your walking for 1-2 minutes at time    In addition, we recommend that you review healthy lifestyles and methods for weight loss available through the National Institutes of Health patient information sites:  http://win.niddk.nih.gov/publications/index.htm    And look into health and wellness programs that may be available through your health insurance provider, employer, local community center, or gregory club.    Weight management plan: Patient was referred to their PCP to discuss a diet and exercise plan.     Your blood pressure was checked while you were in clinic today.  Please read the guidelines below about what these numbers mean and what you should do about them.  Your systolic blood pressure is the top number.  This is the pressure when the heart is pumping.  Your diastolic blood pressure is the bottom number.  This is the pressure in between beats.  If your systolic blood pressure is less than 120 and your diastolic blood pressure is less than 80, then your blood pressure is normal. There is nothing more that you need to do about it  If your systolic blood pressure is 120-139 or your diastolic blood pressure is 80-89, your blood pressure may be higher than it should be.  You should have your blood pressure  re-checked within a year by a primary care provider.  If your systolic blood pressure is 140 or greater or your diastolic blood pressure is 90 or greater, you may have high blood pressure.  High blood pressure is treatable, but if left untreated over time it can put you at risk for heart attack, stroke, or kidney failure.  You should have your blood pressure re-checked by a primary care provider within the next four weeks.                Follow-ups after your visit        Follow-up notes from your care team     Return in about 8 months (around 3/21/2018).      Your next 10 appointments already scheduled     Mar 21, 2018  9:20 AM CDT   Return Sleep Patient with Jacquelin Maurice MD   [facility]    [address] [place] [address] [phone]            Apr 10, 2018  8:30 AM CDT   Lab with  LAB   [facility] ([facility])    [address] [facility] [address] [phone]            Apr 10, 2018  9:30 AM CDT   (Arrive by 9:15 AM)   Return Visit with Grey Owens MD   [facility] ([facility])    [address]  3rd Floor  [facility] [address] [phone]              Who to contact     If you have questions or need follow up information about today's clinic visit or your schedule please contact [facility] directly at [phone].  Normal or non-critical lab and imaging results will be communicated to you by MyChart, letter or phone within 4 business days after the clinic has received the results. If you do not hear from us within 7 days, please contact the clinic through MyChart or phone. If you have a critical or abnormal lab result, we will notify you by phone as soon as possible.  Submit refill requests through [employer] or call your pharmacy and they will forward the refill request to us. Please allow 3  "business days for your refill to be completed.          Additional Information About Your Visit        MyChart Information     [employer] gives you secure access to your electronic health record. If you see a primary care provider, you can also send messages to your care team and make appointments. If you have questions, please call your primary care clinic.  If you do not have a primary care provider, please call [phone] and they will assist you.        Care EveryWhere ID     This is your Care EveryWhere ID. This could be used by other organizations to access your [place] medical records  [id number]        Your Vitals Were     Pulse Respirations Height Pulse Oximetry BMI (Body Mass Index)       62 18 1.6 m (5' 3\") 97% 61.47 kg/m2        Blood Pressure from Last 3 Encounters:   07/20/17 152/60   04/19/17 155/66   04/18/17 152/79    Weight from Last 3 Encounters:   07/20/17 (!) 157.4 kg (347 lb)   04/19/17 (!) 154.5 kg (340 lb 11.2 oz)   04/18/17 (!) 154.2 kg (340 lb)              Today, you had the following     No orders found for display         Today's Medication Changes          These changes are accurate as of: 7/20/17 11:59 PM.  If you have any questions, ask your nurse or doctor.               These medicines have changed or have updated prescriptions.        Dose/Directions    levothyroxine 175 MCG tablet   Commonly known as:  SYNTHROID/LEVOTHROID   This may have changed:  how much to take   Used for:  Need for prophylactic vaccination with tetanus-diphtheria (TD), Other screening mammogram, Unspecified hypothyroidism, Screening for lipoid disorders        Dose:  175 mcg   Take 1 tablet (175 mcg) by mouth daily   Quantity:  90 tablet   Refills:  3                Primary Care Provider Office Phone # Fax #    Mark Askew [phone]       [facility] [address] [place] [address] [address]  [place] [facility] [address]        Equal Access to Services     GUNJAN WOODS AH: Hadii blank Stover " anamaria sandy dioedith edgar. So [facility] [phone].    ATENCIÓN: Si radha cee, tiene a romero disposición servicios gratuitos de asistencia lingüística. Solange al [phone].    We comply with applicable federal civil rights laws and [place] laws. We do not discriminate on the basis of race, color, national origin, age, disability sex, sexual orientation or gender identity.            Thank you!     Thank you for choosing [facility] [place], SLEEP STUDY  for your care. Our goal is always to provide you with excellent care. Hearing back from our patients is one way we can continue to improve our services. Please take a few minutes to complete the written survey that you may receive in the mail after your visit with us. Thank you!             Your Updated Medication List - Protect others around you: Learn how to safely use, store and throw away your medicines at www.disposemymeds.org.          This list is accurate as of: 7/20/17 11:59 PM.  Always use your most recent med list.                   Brand Name Dispense Instructions for use Diagnosis    amLODIPine 5 MG tablet    NORVASC    90 tablet    Take 1 tablet (5 mg) by mouth daily    Unspecified essential hypertension       atenolol 100 MG tablet    TENORMIN    90 tablet    Take 1 tablet (100 mg) by mouth daily    Chronic kidney disease, unspecified, Sjogren's syndrome (H)       cevimeline 30 MG capsule    EVOXAC    90 capsule    Take 1 capsule (30 mg) by mouth 3 times daily    Sjogren's syndrome (H), Vitamin D insufficiency, Long-term use of immunosuppressant medication       clindamycin 300 MG capsule    CLEOCIN    2 capsule    Take  by mouth. 2 capsules 1 hour prior to dental procedures        febuxostat 40 MG Tabs tablet    ULORIC    90 tablet    Take 1 tablet (40 mg) by mouth daily    Gout       hydroxychloroquine 200 MG tablet    PLAQUENIL    180 tablet    Take 2 tablets (400 mg) by mouth daily annual eye exam  needed for further refills fax results to [phone]    Sjogren's syndrome (H), Long-term use of immunosuppressant medication, Vitamin D insufficiency       levothyroxine 175 MCG tablet    SYNTHROID/LEVOTHROID    90 tablet    Take 1 tablet (175 mcg) by mouth daily    Need for prophylactic vaccination with tetanus-diphtheria (TD), Other screening mammogram, Unspecified hypothyroidism, Screening for lipoid disorders       OMEGA-3 FISH OIL PO      Take 3 capsules by mouth daily    Sjogren's syndrome (H), Long-term use of immunosuppressant medication, Vitamin D insufficiency       order for DME     1    CPAP at 12 cm water with heated humidity and mask to fit with appropriate suportive supplies. On at night off in the AM.        terazosin 5 MG capsule    HYTRIN    90 capsule    Take 1 capsule (5 mg) by mouth At Bedtime    Unspecified essential hypertension       vitamin D 60162 UNIT capsule    ERGOCALCIFEROL    12 capsule    Take 1 capsule (50,000 Units) by mouth once a week    Vitamin D insufficiency

## 2017-11-28 DIAGNOSIS — E55.9 VITAMIN D INSUFFICIENCY: ICD-10-CM

## 2017-11-28 DIAGNOSIS — Z79.60 LONG-TERM USE OF IMMUNOSUPPRESSANT MEDICATION: ICD-10-CM

## 2017-11-28 DIAGNOSIS — M35.00 SJOGREN'S SYNDROME (H): ICD-10-CM

## 2017-11-30 RX ORDER — CEVIMELINE HYDROCHLORIDE 30 MG/1
CAPSULE ORAL
Qty: 90 CAPSULE | Refills: 11 | OUTPATIENT
Start: 2017-11-30

## 2018-03-13 LAB — MAMMOGRAM: NORMAL

## 2018-03-21 ENCOUNTER — OFFICE VISIT (OUTPATIENT)
Dept: SLEEP MEDICINE | Facility: CLINIC | Age: 73
End: 2018-03-21
Payer: COMMERCIAL

## 2018-03-21 VITALS
HEART RATE: 58 BPM | OXYGEN SATURATION: 96 % | SYSTOLIC BLOOD PRESSURE: 126 MMHG | HEIGHT: 63 IN | DIASTOLIC BLOOD PRESSURE: 86 MMHG | RESPIRATION RATE: 18 BRPM | WEIGHT: 293 LBS | BODY MASS INDEX: 51.91 KG/M2

## 2018-03-21 DIAGNOSIS — G47.33 OSA (OBSTRUCTIVE SLEEP APNEA): Primary | ICD-10-CM

## 2018-03-21 DIAGNOSIS — E66.01 MORBID OBESITY WITH BMI OF 60.0-69.9, ADULT (H): ICD-10-CM

## 2018-03-21 PROCEDURE — 99214 OFFICE O/P EST MOD 30 MIN: CPT | Performed by: INTERNAL MEDICINE

## 2018-03-21 NOTE — NURSING NOTE
"Chief Complaint   Patient presents with     CPAP Follow Up       Initial /70  Pulse 62  Resp 18  Ht 1.6 m (5' 2.99\")  Wt (!) 155.6 kg (343 lb)  SpO2 96%  BMI 60.78 kg/m2 Estimated body mass index is 60.78 kg/(m^2) as calculated from the following:    Height as of this encounter: 1.6 m (5' 2.99\").    Weight as of this encounter: 155.6 kg (343 lb).  Medication Reconciliation: complete   Jenniffer Chapa [facility] ~[place]      "

## 2018-03-21 NOTE — PROGRESS NOTES
Chief complaint: Follow-up of obstructive sleep apnea    History of Present Illness: 22-year-old female with history of obesity and obstructive sleep apnea.  She is here today for yearly routine follow-up.  Since her last visit she denies any new major medical problems.  She anticipates some surgery on her eye however it is not yet scheduled.  She denies any problems with sleep or with her machine.  Typical bedtime is around 1130 pm she does not take any sleep aids.  She usually gets out of bed between 7 and 730 am  She does not take naps.  If she wakes up at night to go to the bathroom she usually can fall back asleep quickly.  She uses CPAP every time she sleeps.  She cleans her supplies regularly.  She denies any problems with the pressure.  She does sleep with the head of the bed elevated, it to sleep number bed.  She does not like to sleep on her sides.  She uses a nasal mask.  She noticed some leak and so she tighten the straps which seemed to help.  She does use the humidifier.    She has not had significant success with weight control.  Last year she was trying [facility] diet.  She admits that her  likes to cook and he cooks with a lot of meat.  Patient also is not doing her exercises that she had been doing as well.  She does follow with her primary care physician for hypertension and is on 2 medications.    Patient rarely drinks alcohol.  She drinks 3-4 cups of coffee in the morning usually done by noon.  Use of tobacco products or marijuana products.    Gentry Seepiness Scale:3 (Less than 10 normal)    Past Medical History:   Diagnosis Date     Allergic rhinitis due to other allergen      Body mass index 40 and over, adult      CKD (chronic kidney disease), stage III      Dermatophytosis of nail      Diverticulosis of colon (without mention of hemorrhage)      Essential hypertension, benign      Localized osteoarthrosis not specified whether primary or secondary, lower leg 2/24/08     "Hospitalized     Obstructive sleep apnea     severe     Primary localized osteoarthrosis, lower leg 8/30/08    Hospitalized     Sjogren's syndrome (H)      Symptomatic menopausal or female climacteric states      Unspecified hypothyroidism        Allergies   Allergen Reactions     Allopurinol Hives     Cephalosporins      suprax   hives     Codeine      HALLUCINATIONS     Erythromycin      stomach pain     Hydrochlorothiazide Nausea     very irritated stomach and a lot of itching in mouth.     Penicillins      severe stomach reaction and hives     Prednisone      legs swelling     Sulfa Drugs      hives       Current Outpatient Prescriptions   Medication     hydroxychloroquine (PLAQUENIL) 200 MG tablet     vitamin D (ERGOCALCIFEROL) 23360 UNIT capsule     cevimeline (EVOXAC) 30 MG capsule     Omega-3 Fatty Acids (OMEGA-3 FISH OIL PO)     levothyroxine (SYNTHROID, LEVOTHROID) 175 MCG tablet     amLODIPine (NORVASC) 5 MG tablet     atenolol (TENORMIN) 100 MG tablet     febuxostat (ULORIC) 40 MG TABS     clindamycin (CLEOCIN) 300 MG capsule     ORDER FOR DME     No current facility-administered medications for this visit.        Social History     Social History     Marital status:      Spouse name: Guero     Number of children: 2     Years of education: 14     Occupational History     homemaker      Social History Main Topics     Smoking status: Former Smoker     Packs/day: 0.25     Years: 2.00     Types: Cigarettes     Quit date: 2/17/1973     Smokeless tobacco: Never Used      Comment: no second hand smoke at home or work     Alcohol use 0.3 oz/week      Comment: rarely     Drug use: Not on file     Sexual activity: Not on file     Other Topics Concern      Service No     Blood Transfusions No     Caffeine Concern Yes     4 cups of coffee a day     Occupational Exposure No     Hobby Hazards No     Sleep Concern Yes     have problems with sleeping \"I get my brain going and can't seem to turn it off\" " "    Stress Concern No     Weight Concern Yes     dieting now     Special Diet No     Back Care Yes     back aches sometimes     Exercise Yes     leg exercises     Seat Belt Yes     Self-Exams Yes     Social History Narrative       Family History   Problem Relation Age of Onset     Hypertension Daughter      Thyroid Disease Mother      Arthritis Mother      CANCER Mother      Stomach & Ovarian cancer     Breast Cancer Mother      Bilateral     Hypertension Mother      Cancer - colorectal Father      Around age 65     Thyroid Disease Sister      Hypertension Sister      Obesity Sister      Allergies Sister      Hypertension Brother      Hypertension Brother      Hypertension Brother      CANCER Maternal Aunt      Breast     CANCER Other      Maternal cousin with bilateral breast ca     Cancer - colorectal Paternal Uncle      DIABETES No family hx of      CEREBROVASCULAR DISEASE No family hx of      Anesthesia Reaction No family hx of      Blood Disease No family hx of      HEART DISEASE No family hx of      Lipids No family hx of      Musculoskeletal Disorder No family hx of      Neurologic Disorder No family hx of      OSTEOPOROSIS No family hx of      Respiratory No family hx of          EXAM: Pleasant alert obese female no distress  /70  Pulse 62  Resp 18  Ht 1.6 m (5' 2.99\")  Wt (!) 155.6 kg (343 lb)  SpO2 96%  BMI 60.78 kg/m2  Psychiatric: Mood and affect appear normal    Previous sleep studies: First study was done 01/2007.  Sleep efficiency poor with 63%, weight at that time was 255 pounds.  Her apnea-hypopnea index was in the severe range at 43.  She subsequently underwent a titration which failed due to lack of sleep, second titration was attempted and pressure of 12 was identified.  No further sleep studies.   PAP download from 2/19/2018 to 3/20/2018 reviewed:  Per cent of days used greater than 4 Hours 100 % (minimum goal greater than 70%)  Average use on days used: 6 hours 50 min  Settings: Min " EPAP 12 cmH2O    Max EPAP 18 cmH2O  Pressures delivered 90/95th percentile for pressure 16.8 cmH2O  Average AHI 7.3 events per hour (goal less than 5)  Leak 13 minutes, resolved in the last 10 days.    ASSESSMENT: 72-year-old female with obesity, severe obstructive sleep apnea, getting excellent clinical benefit with the use of auto titrating CPAP.  She requires higher pressures.  We are tolerating it slightly elevated AHI due to her excellent clinical benefit.  She is not interested in the addition of an oral appliance.    PLAN: Continue current settings, prescription generated to keep her supplies up-to-date.  Extensive discussion today again regarding the importance of weight control particularly given the severity of her sleep apnea and the high pressure requirement.  Patient was reminded to take her device with her to go to any procedures requiring sedation.  Please see patient instructions for rate further details of counseling provided.  Patient will follow-up in 2 years, I will renew her prescription in 1 year as needed.    Twenty-five minutes spent with patient, >50% spent counseling and coordinating care.      Jacquelin Maurice M.D.  Pulmonary/Critical Care/Sleep Medicine    The above note was dictated using voice recognition software and may include typographical errors. Please contact the author for any clarifications.

## 2018-03-21 NOTE — PATIENT INSTRUCTIONS
Get back to exercise!    1.  Continue CPAP every night for all hours that you are sleeping.  If you nap use CPAP.  As always, try to get at least 8 hours of sleep or more each day, and avoid sleep deprivation. Avoid alcohol.    2.  Reasons that you might need a change to your pressure therapy would be weight gain or loss, waking having inadvertently removed your CPAP overnight, having previously felt refreshed by sleep with CPAP use and now waking un-refreshed, and return of daytime sleepiness. Also, the development of new medical problems can sometimes affect breathing at night-heart failure, stroke, medications such as narcotics.    3.  Please bring CPAP with you if you are hospitalized.  If anticipating surgery be sure to discuss with your surgeon that you have sleep apnea and use PAP therapy.      4.  Maintain your equipment as recommended which includes routine cleaning and replacement of supplies.  Call DME for any questions regarding supplies or maintenance.      5.  Do not drive on engage in potentially dangerous activities if feeling sleepy.    6.  Please see me again in 12 months and bring your machine and card to your follow-up visit.      [facility] DME and cpap specialist [phone]  [facility] [phone]    [facility] DME [phone], CPAP specialist [phone]  [facility] [phone]    [place] [facility], [facility] [phone]    [facility] [place] [facility]  Elodia Torres [phone]  [facility] [place] [phone]  [facility] phone [phone]         Tips for your CPAP and BIPAP use-    Mask fitting tips  Mask fitting exercise:    To improve your mask seal and your mobility at night, put mask on and secure in place.  Lie down in bed with full pressure and roll to one side, adjust headgear while in that  position to eliminate any leaks. Repeat process rolling to other side.     The mask seal does not have to be perfect:   CPAP machines are designed to make up for small leaks. However, you will not tolerate leaks blowing in your eyes so you will need to adjust.   Any leak should only be near or at the bottom of the mask.  We expect your mask to leak slightly at night.    Do not over-tighten the headgear straps, tighter IS NOT better, we expect minimal leak.    First try re-positioning the mask or headgear before tightening the headgear straps.  Mask leaks are expected due to changing sleeping positions. Try pulling the mask away from your skin allowing the cushion to re-inflate will minimize the leak.  If you struggle for a good fit, try turning the CPAP off and then readjust the mask by pulling it away from your face and then turning back on the CPAP.        Humidifier tips  Humidifiers can be adjusted to increase or decrease the amount of moisture according to your comfort level. You may need to adjust this frequently at first, but then might only change it with seasonal weather changes.     Try INCREASING the humidity if:  You experience a dry, irritated nasal passage or throat.  You have a runny, drippy nose or sneezing fits after using CPAP.  You experience nasal congestion during or after CPAP use.    Try DECREASING the humidity if:  You have excessive condensation or  rain out  in the tubing or mask.  Otherwise keep the tubing warm during the night by running it underneath the blankets or pillow.      Clinic visit after initial CPAP and BIPAP set-up   Bring your equipment with you to your 4 week follow up clinic visit.  We will be extracting your data from the machine.        Travel  Always take your equipment with you.  If you fly with your equipment bring it on with you as a carry on.  Medical equipment does not count as a carry on.    If you travel international the machines take 110-240.  The only adapter  needed is the adapter that will fit into the receptacle (outlet).    You may also want to bring an extension cord as many hotel rooms have limited outlets at the bedside.  Do not travel with water in your humidifier chamber.     Cleaning and Maintenance Guidelines    Equipment Frequency Cleaning Method   Mask First Day    Daily      Weekly Soak mask in hot soapy water for 30 minutes, rinse and air dry.  Wipe nasal cushion with a hot soapy (Ivory, baby shampoo) cloth and rinse.  Baby wipes may also be used.  Do not use anti-bacterial soaps,Nell  liquid soap, rubbing alcohol, bleach or ammonia.  Wash frame in hot soapy water (Ivory, baby shampoo) rinse and let air dry   Headgear Biweekly Wash in hot soapy water, rinse and air dry   Reusable Gray Filter Weekly Wash in hot soapy water, rinse, put in towel squeeze moisture out, let air dry   Disposable White Filter Check Weekly Replace when brown or gray in color; at least every 2 to 3 months   Humidifier Chamber Daily    Weekly Empty distilled water from humidifier and let air dry    Hand wash in hot soapy water, rinse and air dry   Tubing Weekly Wash in hot soapy water, rinse and let air dry   Mask, Tubing and Humidifier Chamber As needed Disinfect: Soak in 1 part vinegar to 3 parts hot water for 30 minutes, rinse well and air dry  Not the material headgear        MASK AND SUPPLY REORDERING  Reminder: Most insurance companies will allow for a new mask, headgear, tubing, and reusable gray filter every six months.  Disposable white ultra-fine filters are covered monthly.    EQUIPMENT NEEDS  Please call our CPAP specialist with any equipment problems, questions or needs.    HOME AND SAFETY INSTRUCTIONS    Do not use frayed or cracked electrical cords, multi plug adaptors, or switched receptacles    Do not immerse electrical equipment into water    Assure that electrical cords do not become a tripping hazard      Your BMI is Body mass index is 60.78 kg/(m^2).  Weight  management is a personal decision.  If you are interested in exploring weight loss strategies, the following discussion covers the approaches that may be successful. Body mass index (BMI) is one way to tell whether you are at a healthy weight, overweight, or obese. It measures your weight in relation to your height.  A BMI of 18.5 to 24.9 is in the healthy range. A person with a BMI of 25 to 29.9 is considered overweight, and someone with a BMI of 30 or greater is considered obese. More than two-thirds of American adults are considered overweight or obese.  Being overweight or obese increases the risk for further weight gain. Excess weight may lead to heart disease and diabetes.  Creating and following plans for healthy eating and physical activity may help you improve your health.  Weight control is part of healthy lifestyle and includes exercise, emotional health, and healthy eating habits. Careful eating habits lifelong are the mainstay of weight control. Though there are significant health benefits from weight loss, long-term weight loss with diet alone may be very difficult to achieve- studies show long-term success with dietary management in less than 10% of people. Attaining a healthy weight may be especially difficult to achieve in those with severe obesity. In some cases, medications, devices and surgical management might be considered.  What can you do?  If you are overweight or obese and are interested in methods for weight loss, you should discuss this with your provider.     Consider reducing daily calorie intake by 500 calories.     Keep a food journal.     Avoiding skipping meals, consider cutting portions instead.    Diet combined with exercise helps maintain muscle while optimizing fat loss. Strength training is particularly important for building and maintaining muscle mass. Exercise helps reduce stress, increase energy, and improves fitness. Increasing exercise without diet control, however, may  not burn enough calories to loose weight.       Start walking three days a week 10-20 minutes at a time    Work towards walking thirty minutes five days a week     Eventually, increase the speed of your walking for 1-2 minutes at time    In addition, we recommend that you review healthy lifestyles and methods for weight loss available through the National Institutes of Health patient information sites:  http://win.niddk.nih.gov/publications/index.htm    And look into health and wellness programs that may be available through your health insurance provider, employer, local community center, or [employer]regory club.    Weight management plan: Patient was referred to their PCP to discuss a diet and exercise plan.     Your blood pressure was checked while you were in clinic today.  Please read the guidelines below about what these numbers mean and what you should do about them.  Your systolic blood pressure is the top number.  This is the pressure when the heart is pumping.  Your diastolic blood pressure is the bottom number.  This is the pressure in between beats.  If your systolic blood pressure is less than 120 and your diastolic blood pressure is less than 80, then your blood pressure is normal. There is nothing more that you need to do about it  If your systolic blood pressure is 120-139 or your diastolic blood pressure is 80-89, your blood pressure may be higher than it should be.  You should have your blood pressure re-checked within a year by a primary care provider.  If your systolic blood pressure is 140 or greater or your diastolic blood pressure is 90 or greater, you may have high blood pressure.  High blood pressure is treatable, but if left untreated over time it can put you at risk for heart attack, stroke, or kidney failure.  You should have your blood pressure re-checked by a primary care provider within the next four weeks.

## 2018-03-21 NOTE — NURSING NOTE
"Blood pressure rechecked    Vital signs:      BP: 126/86 Pulse: 58   Resp: 18 SpO2: 96 % O2 Device: None (Room air)   Height: 160 cm (5' 2.99\") Weight: (!) 155.6 kg (343 lb)  Estimated body mass index is 60.78 kg/(m^2) as calculated from the following:    Height as of this encounter: 1.6 m (5' 2.99\").    Weight as of this encounter: 155.6 kg (343 lb).        Jenniffer Chapa [facility]    "

## 2018-03-21 NOTE — MR AVS SNAPSHOT
After Visit Summary   3/21/2018    Angella Jasmine    MRN: [id number]           Patient Information     Date Of Birth          1945        Visit Information        Provider Department      3/21/2018 9:20 AM Jacquelin Maurice MD [facility], [place], Sleep Study        Today's Diagnoses     SG (obstructive sleep apnea) Severe    -  1      Care Instructions    Get back to exercise!    1.  Continue CPAP every night for all hours that you are sleeping.  If you nap use CPAP.  As always, try to get at least 8 hours of sleep or more each day, and avoid sleep deprivation. Avoid alcohol.    2.  Reasons that you might need a change to your pressure therapy would be weight gain or loss, waking having inadvertently removed your CPAP overnight, having previously felt refreshed by sleep with CPAP use and now waking un-refreshed, and return of daytime sleepiness. Also, the development of new medical problems can sometimes affect breathing at night-heart failure, stroke, medications such as narcotics.    3.  Please bring CPAP with you if you are hospitalized.  If anticipating surgery be sure to discuss with your surgeon that you have sleep apnea and use PAP therapy.      4.  Maintain your equipment as recommended which includes routine cleaning and replacement of supplies.  Call DME for any questions regarding supplies or maintenance.      5.  Do not drive on engage in potentially dangerous activities if feeling sleepy.    6.  Please see me again in 12 months and bring your machine and card to your follow-up visit.      [facility] DME and cpap specialist [phone]  [facility] [phone]    [facility] DME [phone], CPAP specialist [phone]  [facility] [phone]    [place] [facility], [facility] [phone]    [facility] DME  Elodia Torres [phone]  [place]  [facility] [place] [phone]  [facility] DME phone [phone]         Tips for your CPAP and BIPAP use-    Mask fitting tips  Mask fitting exercise:    To improve your mask seal and your mobility at night, put mask on and secure in place.  Lie down in bed with full pressure and roll to one side, adjust headgear while in that position to eliminate any leaks. Repeat process rolling to other side.     The mask seal does not have to be perfect:   CPAP machines are designed to make up for small leaks. However, you will not tolerate leaks blowing in your eyes so you will need to adjust.   Any leak should only be near or at the bottom of the mask.  We expect your mask to leak slightly at night.    Do not over-tighten the headgear straps, tighter IS NOT better, we expect minimal leak.    First try re-positioning the mask or headgear before tightening the headgear straps.  Mask leaks are expected due to changing sleeping positions. Try pulling the mask away from your skin allowing the cushion to re-inflate will minimize the leak.  If you struggle for a good fit, try turning the CPAP off and then readjust the mask by pulling it away from your face and then turning back on the CPAP.        Humidifier tips  Humidifiers can be adjusted to increase or decrease the amount of moisture according to your comfort level. You may need to adjust this frequently at first, but then might only change it with seasonal weather changes.     Try INCREASING the humidity if:  You experience a dry, irritated nasal passage or throat.  You have a runny, drippy nose or sneezing fits after using CPAP.  You experience nasal congestion during or after CPAP use.    Try DECREASING the humidity if:  You have excessive condensation or  rain out  in the tubing or mask.  Otherwise keep the tubing warm during the night by running it underneath the blankets or pillow.      Clinic visit after initial CPAP and  BIPAP set-up   Bring your equipment with you to your 4 week follow up clinic visit.  We will be extracting your data from the machine.        Travel  Always take your equipment with you.  If you fly with your equipment bring it on with you as a carry on.  Medical equipment does not count as a carry on.    If you travel international the machines take 110-240.  The only adapter needed is the adapter that will fit into the receptacle (outlet).    You may also want to bring an extension cord as many hot rooms have limited outlets at the bedside.  Do not travel with water in your humidifier chamber.     Cleaning and Maintenance Guidelines    Equipment Frequency Cleaning Method   Mask First Day    Daily      Weekly Soak mask in hot soapy water for 30 minutes, rinse and air dry.  Wipe nasal cushion with a hot soapy (Ivory, baby shampoo) cloth and rinse.  Baby wipes may also be used.  Do not use anti-bacterial soaps,Nell  liquid soap, rubbing alcohol, bleach or ammonia.  Wash frame in hot soapy water (Ivory, baby shampoo) rinse and let air dry   Headgear Biweekly Wash in hot soapy water, rinse and air dry   Reusable Gray Filter Weekly Wash in hot soapy water, rinse, put in towel squeeze moisture out, let air dry   Disposable White Filter Check Weekly Replace when brown or gray in color; at least every 2 to 3 months   Humidifier Chamber Daily    Weekly Empty distilled water from humidifier and let air dry    Hand wash in hot soapy water, rinse and air dry   Tubing Weekly Wash in hot soapy water, rinse and let air dry   Mask, Tubing and Humidifier Chamber As needed Disinfect: Soak in 1 part vinegar to 3 parts hot water for 30 minutes, rinse well and air dry  Not the material headgear        MASK AND SUPPLY REORDERING  Reminder: Most insurance companies will allow for a new mask, headgear, tubing, and reusable gray filter every six months.  Disposable white ultra-fine filters are covered monthly.    EQUIPMENT NEEDS  Please  call our CPAP specialist with any equipment problems, questions or needs.    HOME AND SAFETY INSTRUCTIONS    Do not use frayed or cracked electrical cords, multi plug adaptors, or switched receptacles    Do not immerse electrical equipment into water    Assure that electrical cords do not become a tripping hazard      Your BMI is Body mass index is 60.78 kg/(m^2).  Weight management is a personal decision.  If you are interested in exploring weight loss strategies, the following discussion covers the approaches that may be successful. Body mass index (BMI) is one way to tell whether you are at a healthy weight, overweight, or obese. It measures your weight in relation to your height.  A BMI of 18.5 to 24.9 is in the healthy range. A person with a BMI of 25 to 29.9 is considered overweight, and someone with a BMI of 30 or greater is considered obese. More than two-thirds of American adults are considered overweight or obese.  Being overweight or obese increases the risk for further weight gain. Excess weight may lead to heart disease and diabetes.  Creating and following plans for healthy eating and physical activity may help you improve your health.  Weight control is part of healthy lifestyle and includes exercise, emotional health, and healthy eating habits. Careful eating habits lifelong are the mainstay of weight control. Though there are significant health benefits from weight loss, long-term weight loss with diet alone may be very difficult to achieve- studies show long-term success with dietary management in less than 10% of people. Attaining a healthy weight may be especially difficult to achieve in those with severe obesity. In some cases, medications, devices and surgical management might be considered.  What can you do?  If you are overweight or obese and are interested in methods for weight loss, you should discuss this with your provider.     Consider reducing daily calorie intake by 500 calories.      Keep a food journal.     Avoiding skipping meals, consider cutting portions instead.    Diet combined with exercise helps maintain muscle while optimizing fat loss. Strength training is particularly important for building and maintaining muscle mass. Exercise helps reduce stress, increase energy, and improves fitness. Increasing exercise without diet control, however, may not burn enough calories to loose weight.       Start walking three days a week 10-20 minutes at a time    Work towards walking thirty minutes five days a week     Eventually, increase the speed of your walking for 1-2 minutes at time    In addition, we recommend that you review healthy lifestyles and methods for weight loss available through the National Institutes of Health patient information sites:  http://win.niddk.nih.gov/publications/index.htm    And look into health and wellness programs that may be available through your health insurance provider, employer, local community center, or gregory club.    Weight management plan: Patient was referred to their PCP to discuss a diet and exercise plan.     Your blood pressure was checked while you were in clinic today.  Please read the guidelines below about what these numbers mean and what you should do about them.  Your systolic blood pressure is the top number.  This is the pressure when the heart is pumping.  Your diastolic blood pressure is the bottom number.  This is the pressure in between beats.  If your systolic blood pressure is less than 120 and your diastolic blood pressure is less than 80, then your blood pressure is normal. There is nothing more that you need to do about it  If your systolic blood pressure is 120-139 or your diastolic blood pressure is 80-89, your blood pressure may be higher than it should be.  You should have your blood pressure re-checked within a year by a primary care provider.  If your systolic blood pressure is 140 or greater or your diastolic blood pressure  "is 90 or greater, you may have high blood pressure.  High blood pressure is treatable, but if left untreated over time it can put you at risk for heart attack, stroke, or kidney failure.  You should have your blood pressure re-checked by a primary care provider within the next four weeks.                Follow-ups after your visit        Your next 10 appointments already scheduled     Apr 10, 2018  8:30 AM CDT   Lab with  LAB   [facility] ([facility])    [address]  1st Floor  [place] [facility] [address] [phone]            Apr 10, 2018  9:30 AM CDT   (Arrive by 9:15 AM)   Return Visit with Grey Owens MD   [facility] ([facility])    [address]  [address]  [place] [address] [phone]              Who to contact     If you have questions or need follow up information about today's clinic visit or your schedule please [facility] directly at [phone].  Normal or non-critical lab and imaging results will be communicated to you by [employer], letter or phone within 4 business days after the clinic has received the results. If you do not hear from us within 7 days, please contact the clinic through [employer] or phone. If you have a critical or abnormal lab result, we will notify you by phone as soon as possible.  Submit refill requests through "[employer] or call your pharmacy and they will forward the refill request to us. Please allow 3 business days for your refill to be completed.          Additional Information About Your Visit        "[employer] Information     "[employer] lets you send messages to your doctor, view your test results, renew your prescriptions, schedule appointments and more. To sign up, go to [email or web address]/"[employer] . Click on \"Log in\" on the left side of the screen, which will take you to the Welcome page. Then click on \"Sign up Now\" on the right side of the page.     You will be " "asked to enter the access code listed below, as well as some personal information. Please follow the directions to create your username and password.     Your access code is: [email or web address]  Expires: 2018  9:46 AM     Your access code will  in 90 days. If you need help or a new code, please call your [place] clinic or [phone].        Care EveryWhere ID     This is your Care EveryWhere ID. This could be used by other organizations to access your [facility] medical records  [id number]        Your Vitals Were     Pulse Respirations Height Pulse Oximetry BMI (Body Mass Index)       62 18 1.6 m (5' 2.99\") 96% 60.78 kg/m2        Blood Pressure from Last 3 Encounters:   18 158/70   17 152/60   17 155/66    Weight from Last 3 Encounters:   18 (!) 155.6 kg (343 lb)   17 (!) 157.4 kg (347 lb)   17 (!) 154.5 kg (340 lb 11.2 oz)              We Performed the Following     Comprehensive DME          Today's Medication Changes          These changes are accurate as of 3/21/18  9:51 AM.  If you have any questions, ask your nurse or doctor.               These medicines have changed or have updated prescriptions.        Dose/Directions    levothyroxine 175 MCG tablet   Commonly known as:  SYNTHROID/LEVOTHROID   This may have changed:  how much to take   Used for:  Need for prophylactic vaccination with tetanus-diphtheria (TD), Other screening mammogram, Unspecified hypothyroidism, Screening for lipoid disorders        Dose:  175 mcg   Take 1 tablet (175 mcg) by mouth daily   Quantity:  90 tablet   Refills:  3                Primary Care Provider Office Phone # Fax #    Mark Askew [phone]       [facility] [address] [place] [address] [address]  [place] [facility] [address]        Equal Access to Services     RADHA WOODS AH: Anastacio jolley Socelia, waaxda luqadaha, qaybta kaalmada lucinayacarol, edith antonio. So [facility] [phone].    ATENCIÓN: Si " radha cee, tiene a romero disposición servicios gratuitos de asistencia lingüística. Solange patrick [phone].    We comply with applicable federal civil rights laws and [place] laws. We do not discriminate on the basis of race, color, national origin, age, disability, sex, sexual orientation, or gender identity.            Thank you!     Thank you for choosing [facility] [place]  for your care. Our goal is always to provide you with excellent care. Hearing back from our patients is one way we can continue to improve our services. Please take a few minutes to complete the written survey that you may receive in the mail after your visit with us. Thank you!             Your Updated Medication List - Protect others around you: Learn how to safely use, store and throw away your medicines at www.disposemymeds.org.          This list is accurate as of 3/21/18  9:51 AM.  Always use your most recent med list.                   Brand Name Dispense Instructions for use Diagnosis    amLODIPine 5 MG tablet    NORVASC    90 tablet    Take 1 tablet (5 mg) by mouth daily    Unspecified essential hypertension       atenolol 100 MG tablet    TENORMIN    90 tablet    Take 1 tablet (100 mg) by mouth daily    Chronic kidney disease, unspecified, Sjogren's syndrome (H)       cevimeline 30 MG capsule    EVOXAC    90 capsule    Take 1 capsule (30 mg) by mouth 3 times daily    Sjogren's syndrome (H), Vitamin D insufficiency, Long-term use of immunosuppressant medication       clindamycin 300 MG capsule    CLEOCIN    2 capsule    Take  by mouth. 2 capsules 1 hour prior to dental procedures        febuxostat 40 MG Tabs tablet    ULORIC    90 tablet    Take 1 tablet (40 mg) by mouth daily    Gout       hydroxychloroquine 200 MG tablet    PLAQUENIL    180 tablet    Take 2 tablets (400 mg) by mouth daily annual eye exam needed for further refills fax results to [phone]    Sjogren's syndrome (H), Long-term use of immunosuppressant  medication, Vitamin D insufficiency       levothyroxine 175 MCG tablet    SYNTHROID/LEVOTHROID    90 tablet    Take 1 tablet (175 mcg) by mouth daily    Need for prophylactic vaccination with tetanus-diphtheria (TD), Other screening mammogram, Unspecified hypothyroidism, Screening for lipoid disorders       OMEGA-3 FISH OIL PO      Take 3 capsules by mouth daily    Sjogren's syndrome (H), Long-term use of immunosuppressant medication, Vitamin D insufficiency       order for DME     1    CPAP at 12 cm water with heated humidity and mask to fit with appropriate suportive supplies. On at night off in the AM.        vitamin D 66796 UNIT capsule    ERGOCALCIFEROL    12 capsule    Take 1 capsule (50,000 Units) by mouth once a week    Vitamin D insufficiency

## 2018-03-23 NOTE — NURSING NOTE
[facility]    [facility]

## 2018-04-06 ENCOUNTER — TELEPHONE (OUTPATIENT)
Dept: RHEUMATOLOGY | Facility: CLINIC | Age: 73
End: 2018-04-06

## 2018-04-06 DIAGNOSIS — M35.00 SJOGREN'S SYNDROME (H): ICD-10-CM

## 2018-04-06 DIAGNOSIS — M06.9 RA (RHEUMATOID ARTHRITIS) (H): ICD-10-CM

## 2018-04-06 DIAGNOSIS — M17.10 PRIMARY LOCALIZED OSTEOARTHROSIS, LOWER LEG: Primary | ICD-10-CM

## 2018-04-06 DIAGNOSIS — Z79.899 HIGH RISK MEDICATION USE: ICD-10-CM

## 2018-04-06 NOTE — TELEPHONE ENCOUNTER
----- Message from Nikolas Madrigal sent at 4/6/2018  1:49 PM CDT -----  Regarding: Owens patient with no lab orders for 4/10/18 KANU Lundy,    In order to offer our patients the best possible experience, the lab schedule is reviewed to ensure patients have lab orders in Epic prior to arriving at the lab.     Please have one of your team members place a lab order in Epic for this patient prior to the lab appointment date.      Thank you for your attention,    [facility] [phone]

## 2018-04-10 ENCOUNTER — OFFICE VISIT (OUTPATIENT)
Dept: RHEUMATOLOGY | Facility: CLINIC | Age: 73
End: 2018-04-10
Attending: INTERNAL MEDICINE
Payer: MEDICARE

## 2018-04-10 VITALS
SYSTOLIC BLOOD PRESSURE: 160 MMHG | TEMPERATURE: 97.9 F | WEIGHT: 293 LBS | HEART RATE: 65 BPM | HEIGHT: 63 IN | BODY MASS INDEX: 51.91 KG/M2 | DIASTOLIC BLOOD PRESSURE: 75 MMHG

## 2018-04-10 DIAGNOSIS — M06.9 RA (RHEUMATOID ARTHRITIS) (H): ICD-10-CM

## 2018-04-10 DIAGNOSIS — Z79.899 HIGH RISK MEDICATION USE: ICD-10-CM

## 2018-04-10 DIAGNOSIS — E55.9 VITAMIN D INSUFFICIENCY: ICD-10-CM

## 2018-04-10 DIAGNOSIS — M35.01 SJOGREN'S SYNDROME WITH KERATOCONJUNCTIVITIS SICCA (H): Primary | ICD-10-CM

## 2018-04-10 DIAGNOSIS — Z79.60 LONG-TERM USE OF IMMUNOSUPPRESSANT MEDICATION: ICD-10-CM

## 2018-04-10 DIAGNOSIS — M17.10 PRIMARY LOCALIZED OSTEOARTHROSIS, LOWER LEG: ICD-10-CM

## 2018-04-10 DIAGNOSIS — M35.00 SJOGREN'S SYNDROME (H): ICD-10-CM

## 2018-04-10 LAB
ALBUMIN UR-MCNC: NEGATIVE MG/DL
ALT SERPL W P-5'-P-CCNC: 19 U/L (ref 0–50)
APPEARANCE UR: ABNORMAL
AST SERPL W P-5'-P-CCNC: 16 U/L (ref 0–45)
BACTERIA #/AREA URNS HPF: ABNORMAL /HPF
BASOPHILS # BLD AUTO: 0 10E9/L (ref 0–0.2)
BASOPHILS NFR BLD AUTO: 0.4 %
BILIRUB UR QL STRIP: NEGATIVE
C3 SERPL-MCNC: 143 MG/DL (ref 76–169)
C4 SERPL-MCNC: 27 MG/DL (ref 15–50)
COLOR UR AUTO: YELLOW
CREAT SERPL-MCNC: 1.05 MG/DL (ref 0.52–1.04)
CREAT UR-MCNC: 197 MG/DL
DIFFERENTIAL METHOD BLD: ABNORMAL
EOSINOPHIL # BLD AUTO: 0.2 10E9/L (ref 0–0.7)
EOSINOPHIL NFR BLD AUTO: 2.7 %
ERYTHROCYTE [DISTWIDTH] IN BLOOD BY AUTOMATED COUNT: 13.2 % (ref 10–15)
GFR SERPL CREATININE-BSD FRML MDRD: 51 ML/MIN/1.7M2
GLUCOSE UR STRIP-MCNC: NEGATIVE MG/DL
HCT VFR BLD AUTO: 42.4 % (ref 35–47)
HGB BLD-MCNC: 14.1 G/DL (ref 11.7–15.7)
HGB UR QL STRIP: NEGATIVE
HYALINE CASTS #/AREA URNS LPF: 15 /LPF (ref 0–2)
IMM GRANULOCYTES # BLD: 0 10E9/L (ref 0–0.4)
IMM GRANULOCYTES NFR BLD: 0.4 %
KETONES UR STRIP-MCNC: NEGATIVE MG/DL
LEUKOCYTE ESTERASE UR QL STRIP: ABNORMAL
LYMPHOCYTES # BLD AUTO: 0.5 10E9/L (ref 0.8–5.3)
LYMPHOCYTES NFR BLD AUTO: 9.5 %
MCH RBC QN AUTO: 29.3 PG (ref 26.5–33)
MCHC RBC AUTO-ENTMCNC: 33.3 G/DL (ref 31.5–36.5)
MCV RBC AUTO: 88 FL (ref 78–100)
MONOCYTES # BLD AUTO: 0.5 10E9/L (ref 0–1.3)
MONOCYTES NFR BLD AUTO: 8.8 %
MUCOUS THREADS #/AREA URNS LPF: PRESENT /LPF
NEUTROPHILS # BLD AUTO: 4.4 10E9/L (ref 1.6–8.3)
NEUTROPHILS NFR BLD AUTO: 78.2 %
NITRATE UR QL: POSITIVE
NRBC # BLD AUTO: 0 10*3/UL
NRBC BLD AUTO-RTO: 0 /100
PH UR STRIP: 5 PH (ref 5–7)
PLATELET # BLD AUTO: 192 10E9/L (ref 150–450)
PROT UR-MCNC: 0.29 G/L
PROT/CREAT 24H UR: 0.15 G/G CR (ref 0–0.2)
RBC # BLD AUTO: 4.82 10E12/L (ref 3.8–5.2)
RBC #/AREA URNS AUTO: 5 /HPF (ref 0–2)
RHEUMATOID FACT SER NEPH-ACNC: <20 IU/ML (ref 0–20)
SOURCE: ABNORMAL
SP GR UR STRIP: 1.02 (ref 1–1.03)
SQUAMOUS #/AREA URNS AUTO: 2 /HPF (ref 0–1)
UROBILINOGEN UR STRIP-MCNC: 0 MG/DL (ref 0–2)
WBC # BLD AUTO: 5.6 10E9/L (ref 4–11)
WBC #/AREA URNS AUTO: 101 /HPF (ref 0–5)
WBC CLUMPS #/AREA URNS HPF: PRESENT /HPF

## 2018-04-10 PROCEDURE — 36415 COLL VENOUS BLD VENIPUNCTURE: CPT | Performed by: INTERNAL MEDICINE

## 2018-04-10 PROCEDURE — 82565 ASSAY OF CREATININE: CPT | Performed by: INTERNAL MEDICINE

## 2018-04-10 PROCEDURE — 87186 SC STD MICRODIL/AGAR DIL: CPT | Performed by: INTERNAL MEDICINE

## 2018-04-10 PROCEDURE — 84156 ASSAY OF PROTEIN URINE: CPT | Performed by: INTERNAL MEDICINE

## 2018-04-10 PROCEDURE — 86431 RHEUMATOID FACTOR QUANT: CPT | Performed by: INTERNAL MEDICINE

## 2018-04-10 PROCEDURE — 84450 TRANSFERASE (AST) (SGOT): CPT | Performed by: INTERNAL MEDICINE

## 2018-04-10 PROCEDURE — 87086 URINE CULTURE/COLONY COUNT: CPT | Performed by: INTERNAL MEDICINE

## 2018-04-10 PROCEDURE — 86160 COMPLEMENT ANTIGEN: CPT | Performed by: INTERNAL MEDICINE

## 2018-04-10 PROCEDURE — 85025 COMPLETE CBC W/AUTO DIFF WBC: CPT | Performed by: INTERNAL MEDICINE

## 2018-04-10 PROCEDURE — 81001 URINALYSIS AUTO W/SCOPE: CPT | Performed by: INTERNAL MEDICINE

## 2018-04-10 PROCEDURE — 84460 ALANINE AMINO (ALT) (SGPT): CPT | Performed by: INTERNAL MEDICINE

## 2018-04-10 PROCEDURE — G0463 HOSPITAL OUTPT CLINIC VISIT: HCPCS | Mod: ZF

## 2018-04-10 PROCEDURE — 87088 URINE BACTERIA CULTURE: CPT | Performed by: INTERNAL MEDICINE

## 2018-04-10 PROCEDURE — 82595 ASSAY OF CRYOGLOBULIN: CPT | Performed by: INTERNAL MEDICINE

## 2018-04-10 RX ORDER — CEVIMELINE HYDROCHLORIDE 30 MG/1
30 CAPSULE ORAL 3 TIMES DAILY
Qty: 90 CAPSULE | Refills: 11 | Status: SHIPPED | OUTPATIENT
Start: 2018-04-10 | End: 2019-04-09

## 2018-04-10 RX ORDER — ERGOCALCIFEROL 1.25 MG/1
50000 CAPSULE, LIQUID FILLED ORAL
Qty: 6 CAPSULE | Refills: 3 | Status: SHIPPED | OUTPATIENT
Start: 2018-04-10 | End: 2019-03-21

## 2018-04-10 RX ORDER — HYDROXYCHLOROQUINE SULFATE 200 MG/1
400 TABLET, FILM COATED ORAL DAILY
Qty: 180 TABLET | Refills: 3 | Status: SHIPPED | OUTPATIENT
Start: 2018-04-10 | End: 2019-04-09

## 2018-04-10 ASSESSMENT — PAIN SCALES - GENERAL: PAINLEVEL: NO PAIN (0)

## 2018-04-10 NOTE — LETTER
Patient:  Angella Jasmine  :   1945  MRN:     [id number]        Ms.Ruth SANDRA Jasmine  [address] [address]        2018    Dear ,    We are writing to inform you of your test results.    A urine culture was performed that now demonstrates high numbers of E. Coli bacteria suggesting a urinary tract infection.    I will treat with Nitrofurantoin 1 capsule (100 mg) by mouth 2 times daily for 5 days. I sent a prescription to your pharmacy.    Grey Owens MD  Professor of Medicine  Director, Division of Rheumatic and Autoimmune Diseases        Resulted Orders   Urine Culture Aerobic Bacterial   Result Value Ref Range    Specimen Description Midstream Urine     Special Requests Specimen received in preservative     Culture Micro (A)      >100,000 colonies/mL  Escherichia coli  Susceptibility testing in progress      Culture Micro Culture in progress          [id number]  1945

## 2018-04-10 NOTE — LETTER
"4/10/2018      RE: Angella Jasmine  [address] [facility] [address]       Ms. Kenroy kay 71 y.o. female returns for re-evaluation and mangement of Sjogren's disease associated with arthritis.  CHIKI+, SSA+, SSB-, dsDNA-, RF+ (previously, now negative), HCV-, Cryo-; lip biopsy positive for SS.    She continues to use the hydroxychloroquine 400 mg daily with good tolerance. Eye check one year ago and we are seeking a copy of the evaluation. Cevimeline 30 mg BID-TID is helpful and well tolerated.     She reports persistent dry eyes and dry mouth. Only minor problems with dysphagia if she doesn't use the evoxac. Her joints are fine. No new deformities or swelling and her function is stable. AM stiffness is 30 minutes. Energy is good.     She has a history of vitamin D deficiency, and has been using Vitamin D 50,000 units weekly. Reportedly normal DEXA last year.     PMI:  Medical-HTN, hypothyroidism, sleep apnea on CPAP, obesity, vitamin D deficiency, gout, Sjogren's, sacroiliac joint dysfunction  Surgical-bilateral partial knee arthroplasty  Injuries-none     SH:  Retired. 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 No tobacco or EtOH. Right handed.     FH:  Sister may have SS and RA  Brother with kidney cancer  Cancer in the family  Mother  with cancer, also had RA  Father  with colon cancer  Children are healthy  Daughter with blood clots, uterine cancer s/p hysterectomy    PMSF history personally obtained and updated by me this visit.    ROS:  +weight gain  +Allergy to pcn, codiene, prednisone, sulfa  Remainder of the 14 point ROS obtained and found negative.     PHYSICAL EXAM:   Blood pressure 160/75, pulse 65, temperature 97.9  F (36.6  C), temperature source Oral, height 1.6 m (5' 3\"), weight (!) 155.4 kg (342 lb 8 oz).  Constitutional: Well dress and groomed, calm sitting in exam chair, cooperative, +morbid obesity, difficulty moving to table, uses a " cane  Eyes: Wearing glasses. nl EOM, PERRLA, conjunctiva and sclera clear  ENT: nl nose, hearing, lips, teeth, gums, throat; +parotid swelling without tenderness to palpation  Neck: supple; no masses appreciated.  Resp: lungs clear to auscultation without wheezing or rhonchi  CV: RRR, no murmurs, rubs or gallops, no edema  GI: obese, soft, no mass or tenderness  : not tested  Lymph: no cervical nodes appreciated  MS: +Very subtle heberden's nodes DIP on second digit bilaterally. Neck rotation to 60 degrees bilaterally; Bilateral knees with 90 flexion; All other neck, shoulder, elbow, wrist, hand, knee, ankle, and foot joints were examined and otherwise found normal. Normal  strength, tuck and prayer. No active synovitis or deformity.   Skin: no rash, telangectasia, calcinosis, alopecia.  Neuro: nl cranial nerves, strength, sensation; no LE DTRs  Psych: nl judgement, orientation, memory, affect    Laboratory:    Component      Latest Ref Rng & Units 4/10/2018   WBC      4.0 - 11.0 10e9/L 5.6   RBC Count      3.8 - 5.2 10e12/L 4.82   Hemoglobin      11.7 - 15.7 g/dL 14.1   Hematocrit      35.0 - 47.0 % 42.4   MCV      78 - 100 fl 88   MCH      26.5 - 33.0 pg 29.3   MCHC      31.5 - 36.5 g/dL 33.3   RDW      10.0 - 15.0 % 13.2   Platelet Count      150 - 450 10e9/L 192   Diff Method       Automated Method   % Neutrophils      % 78.2   % Lymphocytes      % 9.5   % Monocytes      % 8.8   % Eosinophils      % 2.7   % Basophils      % 0.4   % Immature Granulocytes      % 0.4   Nucleated RBCs      0 /100 0   Absolute Neutrophil      1.6 - 8.3 10e9/L 4.4   Absolute Lymphocytes      0.8 - 5.3 10e9/L 0.5 (L)   Absolute Monocytes      0.0 - 1.3 10e9/L 0.5   Absolute Eosinophils      0.0 - 0.7 10e9/L 0.2   Absolute Basophils      0.0 - 0.2 10e9/L 0.0   Abs Immature Granulocytes      0 - 0.4 10e9/L 0.0   Absolute Nucleated RBC       0.0   Color Urine       Yellow   Appearance Urine       Cloudy   Glucose Urine       NEG:Negative mg/dL Negative   Bilirubin Urine      NEG:Negative Negative   Ketones Urine      NEG:Negative mg/dL Negative   Specific Gravity Urine      1.003 - 1.035 1.017   Blood Urine      NEG:Negative Negative   pH Urine      5.0 - 7.0 pH 5.0   Protein Albumin Urine      NEG:Negative mg/dL Negative   Urobilinogen mg/dL      0.0 - 2.0 mg/dL 0.0   Nitrite Urine      NEG:Negative Positive (A)   Leukocyte Esterase Urine      NEG:Negative Large (A)   Source       Midstream Urine   WBC Urine      0 - 5 / (H)   RBC Urine      0 - 2 /HPF 5 (H)   WBC Clumps      NEG:Negative /HPF Present (A)   Bacteria Urine      NEG:Negative /HPF Many (A)   Squamous Epithelial /HPF Urine      0 - 1 /HPF 2 (H)   Mucous Urine      NEG:Negative /LPF Present (A)   Hyaline Casts      0 - 2 /LPF 15 (H)   Creatinine      0.52 - 1.04 mg/dL 1.05 (H)   GFR Estimate      >60 mL/min/1.7m2 51 (L)   GFR Estimate If Black      >60 mL/min/1.7m2 62   Protein Random Urine      g/L 0.29   Protein Total Urine g/gr Creatinine      0 - 0.2 g/g Cr 0.15   ALT      0 - 50 U/L 19   AST      0 - 45 U/L 16   Creatinine Urine      mg/dL 197        Rui Smith, OD - 05/16/2017 8:00 AM CDT  OCT performed . Acceptable image capture for both fundus autofluorescence and for macular retinal stratification. Uniform macular pigmentation on fundus autofluorescence ou. No loss of IS/OS junction segments in the parafoveal regions ou( ie intact symmetric ellipsoid zones ou) Called patient on the day of examination and informed of normal test result. Repeat in 1 year.    INDIRA Smith O.D.    Visual fields reviewed, both eyes normal. Acceptable reliability indices ou. No paracentral scotomas indicative of plaquenil toxicity. Called patient on the day of examination and informed of normal test results.Repeat 1 yr.    INDIRA Smith O.D.      Impression:    Sjogren's Disease-associated with mild arthritis, mild lymphopenia, and sicca. Very stable today and no  evidence of immunological progression. Good tolerance of the hydroxychloroquine and we will continue with the regimen. Plan eye check this month.    Vitamin D insufficiency-on replacement therapy. Plan to reduce the vitamin D to 50,000 units very other week.    Plan RTC in 1 year.         Grey Owens MD

## 2018-04-10 NOTE — PROGRESS NOTES
"Ms. Jasmine a 71 y.o. female returns for re-evaluation and mangement of Sjogren's disease associated with arthritis.  CHIKI+, SSA+, SSB-, dsDNA-, RF+ (previously, now negative), HCV-, Cryo-; lip biopsy positive for SS.    She continues to use the hydroxychloroquine 400 mg daily with good tolerance. Eye check one year ago and we are seeking a copy of the evaluation. Cevimeline 30 mg BID-TID is helpful and well tolerated.     She reports persistent dry eyes and dry mouth. Only minor problems with dysphagia if she doesn't use the evoxac. Her joints are fine. No new deformities or swelling and her function is stable. AM stiffness is 30 minutes. Energy is good.     She has a history of vitamin D deficiency, and has been using Vitamin D 50,000 units weekly. Reportedly normal DEXA last year.     PMI:  Medical-HTN, hypothyroidism, sleep apnea on CPAP, obesity, vitamin D deficiency, gout, Sjogren's, sacroiliac joint dysfunction  Surgical-bilateral partial knee arthroplasty  Injuries-none     SH:  Retired. 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 No tobacco or EtOH. Right handed.     FH:  Sister may have SS and RA  Brother with kidney cancer  Cancer in the family  Mother  with cancer, also had RA  Father  with colon cancer  Children are healthy  Daughter with blood clots, uterine cancer s/p hysterectomy    PMSF history personally obtained and updated by me this visit.    ROS:  +weight gain  +Allergy to pcn, codiene, prednisone, sulfa  Remainder of the 14 point ROS obtained and found negative.     PHYSICAL EXAM:   Blood pressure 160/75, pulse 65, temperature 97.9  F (36.6  C), temperature source Oral, height 1.6 m (5' 3\"), weight (!) 155.4 kg (342 lb 8 oz).  Constitutional: Well dress and groomed, calm sitting in exam chair, cooperative, +morbid obesity, difficulty moving to table, uses a cane  Eyes: Wearing glasses. nl EOM, PERRLA, conjunctiva and sclera clear  ENT: nl " nose, hearing, lips, teeth, gums, throat; +parotid swelling without tenderness to palpation  Neck: supple; no masses appreciated.  Resp: lungs clear to auscultation without wheezing or rhonchi  CV: RRR, no murmurs, rubs or gallops, no edema  GI: obese, soft, no mass or tenderness  : not tested  Lymph: no cervical nodes appreciated  MS: +Very subtle heberden's nodes DIP on second digit bilaterally. Neck rotation to 60 degrees bilaterally; Bilateral knees with 90 flexion; All other neck, shoulder, elbow, wrist, hand, knee, ankle, and foot joints were examined and otherwise found normal. Normal  strength, tuck and prayer. No active synovitis or deformity.   Skin: no rash, telangectasia, calcinosis, alopecia.  Neuro: nl cranial nerves, strength, sensation; no LE DTRs  Psych: nl judgement, orientation, memory, affect    Laboratory:    Component      Latest Ref Rng & Units 4/10/2018   WBC      4.0 - 11.0 10e9/L 5.6   RBC Count      3.8 - 5.2 10e12/L 4.82   Hemoglobin      11.7 - 15.7 g/dL 14.1   Hematocrit      35.0 - 47.0 % 42.4   MCV      78 - 100 fl 88   MCH      26.5 - 33.0 pg 29.3   MCHC      31.5 - 36.5 g/dL 33.3   RDW      10.0 - 15.0 % 13.2   Platelet Count      150 - 450 10e9/L 192   Diff Method       Automated Method   % Neutrophils      % 78.2   % Lymphocytes      % 9.5   % Monocytes      % 8.8   % Eosinophils      % 2.7   % Basophils      % 0.4   % Immature Granulocytes      % 0.4   Nucleated RBCs      0 /100 0   Absolute Neutrophil      1.6 - 8.3 10e9/L 4.4   Absolute Lymphocytes      0.8 - 5.3 10e9/L 0.5 (L)   Absolute Monocytes      0.0 - 1.3 10e9/L 0.5   Absolute Eosinophils      0.0 - 0.7 10e9/L 0.2   Absolute Basophils      0.0 - 0.2 10e9/L 0.0   Abs Immature Granulocytes      0 - 0.4 10e9/L 0.0   Absolute Nucleated RBC       0.0   Color Urine       Yellow   Appearance Urine       Cloudy   Glucose Urine      NEG:Negative mg/dL Negative   Bilirubin Urine      NEG:Negative Negative   Ketones Urine       NEG:Negative mg/dL Negative   Specific Gravity Urine      1.003 - 1.035 1.017   Blood Urine      NEG:Negative Negative   pH Urine      5.0 - 7.0 pH 5.0   Protein Albumin Urine      NEG:Negative mg/dL Negative   Urobilinogen mg/dL      0.0 - 2.0 mg/dL 0.0   Nitrite Urine      NEG:Negative Positive (A)   Leukocyte Esterase Urine      NEG:Negative Large (A)   Source       Midstream Urine   WBC Urine      0 - 5 / (H)   RBC Urine      0 - 2 /HPF 5 (H)   WBC Clumps      NEG:Negative /HPF Present (A)   Bacteria Urine      NEG:Negative /HPF Many (A)   Squamous Epithelial /HPF Urine      0 - 1 /HPF 2 (H)   Mucous Urine      NEG:Negative /LPF Present (A)   Hyaline Casts      0 - 2 /LPF 15 (H)   Creatinine      0.52 - 1.04 mg/dL 1.05 (H)   GFR Estimate      >60 mL/min/1.7m2 51 (L)   GFR Estimate If Black      >60 mL/min/1.7m2 62   Protein Random Urine      g/L 0.29   Protein Total Urine g/gr Creatinine      0 - 0.2 g/g Cr 0.15   ALT      0 - 50 U/L 19   AST      0 - 45 U/L 16   Creatinine Urine      mg/dL 197        Rui Smith, OD - 05/16/2017 8:00 AM CDT  OCT performed . Acceptable image capture for both fundus autofluorescence and for macular retinal stratification. Uniform macular pigmentation on fundus autofluorescence ou. No loss of IS/OS junction segments in the parafoveal regions ou( ie intact symmetric ellipsoid zones ou) Called patient on the day of examination and informed of normal test result. Repeat in 1 year.    INDIRA Smith O.D.    Visual fields reviewed, both eyes normal. Acceptable reliability indices ou. No paracentral scotomas indicative of plaquenil toxicity. Called patient on the day of examination and informed of normal test results.Repeat 1 yr.    INDIRA Smith O.D.      Impression:    Sjogren's Disease-associated with mild arthritis, mild lymphopenia, and sicca. Very stable today and no evidence of immunological progression. Good tolerance of the hydroxychloroquine and we will  continue with the regimen. Plan eye check this month.    Vitamin D insufficiency-on replacement therapy. Plan to reduce the vitamin D to 50,000 units very other week.    Plan RTC in 1 year.

## 2018-04-10 NOTE — MR AVS SNAPSHOT
After Visit Summary   4/10/2018    Angella Jasmine    MRN: [id number]           Patient Information     Date Of Birth          1945        Visit Information        Provider Department      4/10/2018 9:30 AM Grey Owens MD [facility] Rheumat[facility]        Today's Diagnoses     Sjogren's syndrome with keratoconjunctivitis sicca (H)    -  1    Vitamin D insufficiency        Long-term use of immunosuppressant medication           Follow-ups after your visit        Follow-up notes from your care team     Return in about 1 year (around 4/10/2019).      Your next 10 appointments already scheduled     Apr 09, 2019  8:30 AM CDT   Lab with  LAB   [facility] ([facility])    [address]  1st Floor  [facility] [address] [phone]            Apr 09, 2019  9:30 AM CDT   (Arrive by 9:15 AM)   Return Visit with Grey Owens MD   [facility] ([facility])    [address]  [address]  [facility] [address] [phone]              Who to contact     If you have questions or need follow up information about today's clinic visit or your schedule please contact [facility] RHEUMATOLOGY directly at [phone].  Normal or non-critical lab and imaging results will be communicated to you by MyChart, letter or phone within 4 business days after the clinic has received the results. If you do not hear from us within 7 days, please contact the clinic through [employer] or phone. If you have a critical or abnormal lab result, we will notify you by phone as soon as possible.  Submit refill requests through [employer] or call your pharmacy and they will forward the refill request to us. Please allow 3 business days for your refill to be completed.          Additional Information About Your Visit        MyChart Information     [employer] lets you send messages to your doctor, view your test results, renew your prescriptions, schedule  "appointments and more. To sign up, go to [email or web address] . Click on \"Log in\" on the left side of the screen, which will take you to the Welcome page. Then click on \"Sign up Now\" on the right side of the page.     You will be asked to enter the access code listed below, as well as some personal information. Please follow the directions to create your username and password.     Your access code is: [email or web address]  Expires: 2018  9:46 AM     Your access code will  in 90 days. If you need help or a new code, please call your [facility] clinic or [phone].        Care EveryWhere ID     This is your Care EveryWhere ID. This could be used by other organizations to access your [facility] medical records  [id number]        Your Vitals Were     Pulse Temperature Height BMI (Body Mass Index)          65 97.9  F (36.6  C) (Oral) 1.6 m (5' 3\") 60.67 kg/m2         Blood Pressure from Last 3 Encounters:   04/10/18 160/75   18 126/86   17 152/60    Weight from Last 3 Encounters:   04/10/18 (!) 155.4 kg (342 lb 8 oz)   18 (!) 155.6 kg (343 lb)   17 (!) 157.4 kg (347 lb)              We Performed the Following     Urine Culture Aerobic Bacterial          Today's Medication Changes          These changes are accurate as of 4/10/18 10:28 AM.  If you have any questions, ask your nurse or doctor.               These medicines have changed or have updated prescriptions.        Dose/Directions    levothyroxine 175 MCG tablet   Commonly known as:  SYNTHROID/LEVOTHROID   This may have changed:  how much to take   Used for:  Need for prophylactic vaccination with tetanus-diphtheria (TD), Other screening mammogram, Unspecified hypothyroidism, Screening for lipoid disorders        Dose:  175 mcg   Take 1 tablet (175 mcg) by mouth daily   Quantity:  90 tablet   Refills:  3       vitamin D 73942 UNIT capsule   Commonly known as:  ERGOCALCIFEROL   This may have changed:  when to take this   Used for:  " Vitamin D insufficiency   Changed by:  Grey Owens MD        Dose:  21671 Units   Take 1 capsule (50,000 Units) by mouth every 14 days   Quantity:  6 capsule   Refills:  3         Stop taking these medicines if you haven't already. Please contact your care team if you have questions.     OMEGA-3 FISH OIL PO   Stopped by:  Grey Owens MD                Where to get your medicines      These medications were sent to [facility] [address] [place] [address] [place] [address]  [address] [facility] [address]     Phone:  [phone]     cevimeline 30 MG capsule    hydroxychloroquine 200 MG tablet    vitamin D 84402 UNIT capsule                Primary Care Provider Office Phone # Fax #    Mark Askew [phone]       [facility] [place] [address]  [place] [facility] [address]        Equal Access to Services     : Hadii aad ku hadasho Soomaali, waaxda luqadaha, qaybta kaalmada adeegyada, waxay idiin hayaan lucina mitchell . So [facility] [phone].    ATENCIÓN: Si habla español, tiene a romero disposición servicios gratuitos de asistencia lingüística. Llame al [phone].    We comply with applicable federal civil rights laws and [place] laws. We do not discriminate on the basis of race, color, national origin, age, disability, sex, sexual orientation, or gender identity.            Thank you!     Thank you for choosing [facility] RHEUMATOLOGY  for your care. Our goal is always to provide you with excellent care. Hearing back from our patients is one way we can continue to improve our services. Please take a few minutes to complete the written survey that you may receive in the mail after your visit with us. Thank you!             Your Updated Medication List - Protect others around you: Learn how to safely use, store and throw away your medicines at www.disposemymeds.org.          This list is accurate as of 4/10/18 10:28 AM.  Always use your most recent med list.                    Brand Name Dispense Instructions for use Diagnosis    amLODIPine 5 MG tablet    NORVASC    90 tablet    Take 1 tablet (5 mg) by mouth daily    Unspecified essential hypertension       atenolol 100 MG tablet    TENORMIN    90 tablet    Take 1 tablet (100 mg) by mouth daily    Chronic kidney disease, unspecified, Sjogren's syndrome (H)       cevimeline 30 MG capsule    EVOXAC    90 capsule    Take 1 capsule (30 mg) by mouth 3 times daily    Sjogren's syndrome with keratoconjunctivitis sicca (H), Vitamin D insufficiency, Long-term use of immunosuppressant medication       clindamycin 300 MG capsule    CLEOCIN    2 capsule    Take  by mouth. 2 capsules 1 hour prior to dental procedures        febuxostat 40 MG Tabs tablet    ULORIC    90 tablet    Take 1 tablet (40 mg) by mouth daily    Gout       hydroxychloroquine 200 MG tablet    PLAQUENIL    180 tablet    Take 2 tablets (400 mg) by mouth daily annual eye exam needed for further refills fax results to [phone]    Sjogren's syndrome with keratoconjunctivitis sicca (H), Long-term use of immunosuppressant medication, Vitamin D insufficiency       levothyroxine 175 MCG tablet    SYNTHROID/LEVOTHROID    90 tablet    Take 1 tablet (175 mcg) by mouth daily    Need for prophylactic vaccination with tetanus-diphtheria (TD), Other screening mammogram, Unspecified hypothyroidism, Screening for lipoid disorders       order for DME     1    CPAP at 12 cm water with heated humidity and mask to fit with appropriate suportive supplies. On at night off in the AM.        vitamin D 54204 UNIT capsule    ERGOCALCIFEROL    6 capsule    Take 1 capsule (50,000 Units) by mouth every 14 days    Vitamin D insufficiency

## 2018-04-10 NOTE — NURSING NOTE
"Chief Complaint   Patient presents with     RECHECK     follow up with [facility]       Initial /75  Pulse 65  Temp 97.9  F (36.6  C) (Oral)  Ht 1.6 m (5' 3\")  Wt (!) 155.4 kg (342 lb 8 oz)  BMI 60.67 kg/m2 Estimated body mass index is 60.67 kg/(m^2) as calculated from the following:    Height as of this encounter: 1.6 m (5' 3\").    Weight as of this encounter: 155.4 kg (342 lb 8 oz).  Medication Reconciliation: complete    "

## 2018-04-10 NOTE — LETTER
Patient:  Angella Jasmine  :   1945  MRN:     [id number]        Ms.Ruth SANDRA Jasmine  [address] [facility] [address]        2018    Dear ,    We are writing to inform you of your test results.    A urinary tract infection was detected in your urine testing. You should be taking antibiotic for this as prescribed.    These results do not require a change.  Please discuss at your next visit.  It was a pleasure to see you at your recent visit. Please let me know if you have any questions or concerns.    Grey Owens MD  Professor of Medicine  Director, Division of Rheumatic and Autoimmune Diseases        Resulted Orders   Urine Culture Aerobic Bacterial   Result Value Ref Range    Specimen Description Midstream Urine     Special Requests Specimen received in preservative     Culture Micro >100,000 colonies/mL  Escherichia coli   (A)     Culture Micro >100,000 colonies/mL  Strain 2  Escherichia coli   (A)          [id number]  1945

## 2018-04-12 ENCOUNTER — TELEPHONE (OUTPATIENT)
Dept: RHEUMATOLOGY | Facility: CLINIC | Age: 73
End: 2018-04-12

## 2018-04-12 DIAGNOSIS — N30.00 ACUTE CYSTITIS WITHOUT HEMATURIA: Primary | ICD-10-CM

## 2018-04-12 RX ORDER — NITROFURANTOIN 25; 75 MG/1; MG/1
100 CAPSULE ORAL 2 TIMES DAILY
Qty: 10 CAPSULE | Refills: 0 | Status: SHIPPED | OUTPATIENT
Start: 2018-04-12 | End: 2018-04-17

## 2018-04-12 NOTE — TELEPHONE ENCOUNTER
Called back Angella to let her know that Dr Owens said she has a UTI and that he sent the medications to the [facility]. She will go and get it and has no further questions.     Grey Owens MD  P Adult Rheum Triage-Uc                     A urine culture was performed that now demonstrates high numbers of E. Coli.     Please let the patient know and we will treat with Nitrofurantoin 1 capsule (100 mg) by mouth 2 times daily for 5 days. I sent a prescription to her pharmacy

## 2018-04-12 NOTE — TELEPHONE ENCOUNTER
Grey Owens MD  P Adult Rheum Triage-Uc                     A urine culture was performed that now demonstrates high numbers of E. Coli.     Please let the patient know and we will treat with Nitrofurantoin 1 capsule (100 mg) by mouth 2 times daily for 5 days. I sent a prescription to her pharmacy.

## 2018-04-13 LAB
BACTERIA SPEC CULT: ABNORMAL
BACTERIA SPEC CULT: ABNORMAL
Lab: ABNORMAL
SPECIMEN SOURCE: ABNORMAL

## 2018-04-16 LAB — CRYOGLOB SER QL: ABNORMAL %

## 2019-02-26 ENCOUNTER — DOCUMENTATION ONLY (OUTPATIENT)
Dept: CARE COORDINATION | Facility: CLINIC | Age: 74
End: 2019-02-26

## 2019-03-20 DIAGNOSIS — E55.9 VITAMIN D INSUFFICIENCY: ICD-10-CM

## 2019-03-21 NOTE — TELEPHONE ENCOUNTER
vitamin D (ERGOCALCIFEROL) 24686 UNIT capsule   Last Written Prescription Date:  4/10/18  Last Fill Quantity: 6 cap,   # refills: 3  Last Office Visit : 4/10/18  Future Office visit: 4/9/19      Routing refill request to provider for review/approval because: high dose

## 2019-03-22 RX ORDER — ERGOCALCIFEROL 1.25 MG/1
50000 CAPSULE, LIQUID FILLED ORAL
Qty: 6 CAPSULE | Refills: 3 | Status: SHIPPED | OUTPATIENT
Start: 2019-03-22 | End: 2019-04-09

## 2019-04-09 ENCOUNTER — OFFICE VISIT (OUTPATIENT)
Dept: RHEUMATOLOGY | Facility: CLINIC | Age: 74
End: 2019-04-09
Attending: INTERNAL MEDICINE
Payer: MEDICARE

## 2019-04-09 VITALS
RESPIRATION RATE: 18 BRPM | WEIGHT: 293 LBS | HEART RATE: 57 BPM | TEMPERATURE: 98.7 F | HEIGHT: 63 IN | SYSTOLIC BLOOD PRESSURE: 145 MMHG | DIASTOLIC BLOOD PRESSURE: 84 MMHG | BODY MASS INDEX: 51.91 KG/M2 | OXYGEN SATURATION: 95 %

## 2019-04-09 DIAGNOSIS — Z23 ENCOUNTER FOR VACCINATION: ICD-10-CM

## 2019-04-09 DIAGNOSIS — M35.00 SJOGREN'S SYNDROME (H): ICD-10-CM

## 2019-04-09 DIAGNOSIS — E55.9 VITAMIN D INSUFFICIENCY: ICD-10-CM

## 2019-04-09 DIAGNOSIS — M35.00 SJOGREN'S SYNDROME WITHOUT EXTRAGLANDULAR INVOLVEMENT (H): ICD-10-CM

## 2019-04-09 DIAGNOSIS — M17.10 PRIMARY LOCALIZED OSTEOARTHROSIS, LOWER LEG: ICD-10-CM

## 2019-04-09 DIAGNOSIS — M35.01 SJOGREN'S SYNDROME WITH KERATOCONJUNCTIVITIS SICCA (H): ICD-10-CM

## 2019-04-09 DIAGNOSIS — M06.9 RA (RHEUMATOID ARTHRITIS) (H): ICD-10-CM

## 2019-04-09 DIAGNOSIS — Z79.60 LONG-TERM USE OF IMMUNOSUPPRESSANT MEDICATION: Primary | ICD-10-CM

## 2019-04-09 DIAGNOSIS — Z79.899 HIGH RISK MEDICATION USE: ICD-10-CM

## 2019-04-09 LAB
ALT SERPL W P-5'-P-CCNC: 24 U/L (ref 0–50)
AST SERPL W P-5'-P-CCNC: 20 U/L (ref 0–45)
BASOPHILS # BLD AUTO: 0 10E9/L (ref 0–0.2)
BASOPHILS NFR BLD AUTO: 0.7 %
CREAT SERPL-MCNC: 1.08 MG/DL (ref 0.52–1.04)
DIFFERENTIAL METHOD BLD: NORMAL
EOSINOPHIL # BLD AUTO: 0.1 10E9/L (ref 0–0.7)
EOSINOPHIL NFR BLD AUTO: 3 %
ERYTHROCYTE [DISTWIDTH] IN BLOOD BY AUTOMATED COUNT: 14.1 % (ref 10–15)
GFR SERPL CREATININE-BSD FRML MDRD: 51 ML/MIN/{1.73_M2}
HCT VFR BLD AUTO: 43.9 % (ref 35–47)
HGB BLD-MCNC: 14.2 G/DL (ref 11.7–15.7)
IMM GRANULOCYTES # BLD: 0 10E9/L (ref 0–0.4)
IMM GRANULOCYTES NFR BLD: 0.7 %
LYMPHOCYTES # BLD AUTO: 0.8 10E9/L (ref 0.8–5.3)
LYMPHOCYTES NFR BLD AUTO: 18.3 %
MCH RBC QN AUTO: 28.5 PG (ref 26.5–33)
MCHC RBC AUTO-ENTMCNC: 32.3 G/DL (ref 31.5–36.5)
MCV RBC AUTO: 88 FL (ref 78–100)
MONOCYTES # BLD AUTO: 0.5 10E9/L (ref 0–1.3)
MONOCYTES NFR BLD AUTO: 12 %
NEUTROPHILS # BLD AUTO: 2.8 10E9/L (ref 1.6–8.3)
NEUTROPHILS NFR BLD AUTO: 65.3 %
NRBC # BLD AUTO: 0 10*3/UL
NRBC BLD AUTO-RTO: 0 /100
PLATELET # BLD AUTO: 190 10E9/L (ref 150–450)
RBC # BLD AUTO: 4.98 10E12/L (ref 3.8–5.2)
WBC # BLD AUTO: 4.3 10E9/L (ref 4–11)

## 2019-04-09 PROCEDURE — 82565 ASSAY OF CREATININE: CPT | Performed by: INTERNAL MEDICINE

## 2019-04-09 PROCEDURE — G0463 HOSPITAL OUTPT CLINIC VISIT: HCPCS | Mod: 25,ZF

## 2019-04-09 PROCEDURE — 90471 IMMUNIZATION ADMIN: CPT | Mod: ZF

## 2019-04-09 PROCEDURE — 25000581 ZZH RX MED A9270 GY (STAT IND- M) 250: Mod: GY | Performed by: INTERNAL MEDICINE

## 2019-04-09 PROCEDURE — 84460 ALANINE AMINO (ALT) (SGPT): CPT | Performed by: INTERNAL MEDICINE

## 2019-04-09 PROCEDURE — 84450 TRANSFERASE (AST) (SGOT): CPT | Performed by: INTERNAL MEDICINE

## 2019-04-09 PROCEDURE — 90750 HZV VACC RECOMBINANT IM: CPT | Mod: GY | Performed by: INTERNAL MEDICINE

## 2019-04-09 PROCEDURE — 36415 COLL VENOUS BLD VENIPUNCTURE: CPT | Performed by: INTERNAL MEDICINE

## 2019-04-09 PROCEDURE — 85025 COMPLETE CBC W/AUTO DIFF WBC: CPT | Performed by: INTERNAL MEDICINE

## 2019-04-09 RX ORDER — ERGOCALCIFEROL 1.25 MG/1
50000 CAPSULE, LIQUID FILLED ORAL
Qty: 6 CAPSULE | Refills: 3 | Status: SHIPPED | OUTPATIENT
Start: 2019-04-09 | End: 2020-04-07

## 2019-04-09 RX ORDER — HYDROXYCHLOROQUINE SULFATE 200 MG/1
200 TABLET, FILM COATED ORAL 2 TIMES DAILY
Qty: 180 TABLET | Refills: 3 | Status: SHIPPED | OUTPATIENT
Start: 2019-04-09 | End: 2020-04-07

## 2019-04-09 RX ORDER — GLYCERIN/PROPYLENE GLYCOL 0.6 %-0.6%
DROPPERETTE, SINGLE-USE DROP DISPENSER OPHTHALMIC (EYE) PRN
COMMUNITY

## 2019-04-09 RX ORDER — CEVIMELINE HYDROCHLORIDE 30 MG/1
30 CAPSULE ORAL 3 TIMES DAILY
Qty: 90 CAPSULE | Refills: 11 | Status: SHIPPED | OUTPATIENT
Start: 2019-04-09 | End: 2020-04-07

## 2019-04-09 RX ADMIN — ZOSTER VACCINE RECOMBINANT, ADJUVANTED 0.5 ML: KIT at 10:35

## 2019-04-09 ASSESSMENT — MIFFLIN-ST. JEOR: SCORE: 1951.04

## 2019-04-09 ASSESSMENT — PAIN SCALES - GENERAL: PAINLEVEL: NO PAIN (0)

## 2019-04-09 NOTE — NURSING NOTE
Prior to injection, verified patient identity using patient's name and date of birth.  Due to injection administration, patient instructed to remain in clinic for 15 minutes  afterwards, and to report any adverse reaction to me immediately.    First dose of Shingrix given per Dr. Owens, injection given without complications or questions, see immunizations for details.    Reminded pt to get second dose in 8 weeks to 6 months, she verbally understood and agreed.    Tatiana Moscoso [facility]  4/9/2019 11:13 AM

## 2019-04-09 NOTE — LETTER
2019       RE: Angella Jasmine  [address] [facility] [address]     Dear Colleague,    Thank you for referring your patient, Angella Jasmine, to the [facility] RHEUMATOLOGY at [facility]. Please see a copy of my visit note below.    Ms. Kenroy kay 71 y.o. female returns for re-evaluation and mangement of Sjogren's disease associated with arthritis.  CHIKI+, SSA+, SSB-, dsDNA-, RF+ (previously, now negative), HCV-, Cryo-; lip biopsy positive for SS.    She will use refresh eye drops for dry eyes. No arthritis to report. She has intentionally lost weight due to improved diet and new exercise.     Her knee comfort is stable to improved with weight loss. Her hand joints are generally comfortable, but she has right 3rd digit DIP swelling, redness and warmth. Her left thumb is also swollen.  Energy is good and no AM stiffness.     Mouth dryness is stable, only rare dysphagia for dry food. She admits to stable submandibular swelling but no pain.     Hydroxychloroquine 400 mg daily with normal eye check. Cevimeline 30 mg BID-TID with good tolerance and benefit. Vitamin D 50,000 units weekly.    PMI:  Medical-HTN, hypothyroidism, sleep apnea on CPAP, obesity, vitamin D deficiency, gout, Sjogren's, sacroiliac joint dysfunction  Surgical-bilateral partial knee arthroplasty  Injuries-none     SH:  Retired. 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 No tobacco or EtOH. Right handed.     FH:  Sister may have SS and RA  Brother with kidney cancer  Cancer in the family  Mother  with cancer, also had RA  Father  with colon cancer  Children are healthy  Daughter with blood clots, uterine cancer s/p hysterectomy    PMSF history personally obtained and updated by me this visit.    ROS:  +episode of esophageal spasm  +chronic low back pain  +Allergy to pcn, codiene, prednisone, sulfa  Remainder of the 14 point ROS obtained and found negative.    "  PHYSICAL EXAM:   Blood pressure 145/84, pulse 57, temperature 98.7  F (37.1  C), temperature source Oral, resp. rate 18, height 1.6 m (5' 3\"), weight 147.7 kg (325 lb 9.6 oz), SpO2 95 %.  Constitutional: Well dress and groomed, calm sitting in exam chair, cooperative, +morbid obesity, difficulty moving to table, uses a cane  Eyes: Wearing glasses. nl EOM, PERRLA, conjunctiva and sclera clear  ENT: nl nose, hearing, lips, teeth, gums, throat; +parotid swelling without tenderness to palpation  Neck: supple; no masses appreciated.  Resp: lungs clear to auscultation without wheezing or rhonchi  CV: RRR, no murmurs, rubs or gallops, no edema  GI: obese, soft, no mass or tenderness  : not tested  Lymph: no cervical nodes appreciated  MS: +Very subtle heberden's nodes DIP on second digit bilaterally. Neck rotation to 60 degrees bilaterally; Bilateral knees with 90 flexion; All other neck, shoulder, elbow, wrist, hand, knee, ankle, and foot joints were examined and otherwise found normal. Normal  strength, tuck and prayer. No active synovitis or deformity.   Skin: no rash, telangectasia, calcinosis, alopecia.  Neuro: nl cranial nerves, strength, sensation; no LE DTRs  Psych: nl judgement, orientation, memory, affect    Laboratory:    Component      Latest Ref Rng & Units 4/9/2019   WBC      4.0 - 11.0 10e9/L 4.3   RBC Count      3.8 - 5.2 10e12/L 4.98   Hemoglobin      11.7 - 15.7 g/dL 14.2   Hematocrit      35.0 - 47.0 % 43.9   MCV      78 - 100 fl 88   MCH      26.5 - 33.0 pg 28.5   MCHC      31.5 - 36.5 g/dL 32.3   RDW      10.0 - 15.0 % 14.1   Platelet Count      150 - 450 10e9/L 190   Diff Method       Automated Method   % Neutrophils      % 65.3   % Lymphocytes      % 18.3   % Monocytes      % 12.0   % Eosinophils      % 3.0   % Basophils      % 0.7   % Immature Granulocytes      % 0.7   Nucleated RBCs      0 /100 0   Absolute Neutrophil      1.6 - 8.3 10e9/L 2.8   Absolute Lymphocytes      0.8 - 5.3 10e9/L " 0.8   Absolute Monocytes      0.0 - 1.3 10e9/L 0.5   Absolute Eosinophils      0.0 - 0.7 10e9/L 0.1   Absolute Basophils      0.0 - 0.2 10e9/L 0.0   Abs Immature Granulocytes      0 - 0.4 10e9/L 0.0   Absolute Nucleated RBC       0.0   Creatinine      0.52 - 1.04 mg/dL 1.08 (H)   GFR Estimate      >60 mL/min/1.73:m2 51 (L)   GFR Estimate If Black      >60 mL/min/1.73:m2 59 (L)   AST      0 - 45 U/L 20   ALT      0 - 50 U/L 24     Impression:    Sjogren's Disease-associated with mild arthritis, mild lymphopenia, and sicca. No evidence of active systemic inflammatory disease today. Good tolerance of the combination of hydroxychloroquine and cevimuline and we will continue this regimen.     Vitamin D insufficiency-continue vitamin D to 50,000 units very other week.    Long term management of immunosuppression-with stable lab testing. Give shingrix immunization today.    Plan RTC in 1 year.         Again, thank you for allowing me to participate in the care of your patient.      Sincerely,    Grey Owens MD

## 2019-04-09 NOTE — NURSING NOTE
"Chief Complaint   Patient presents with     RECHECK     Sjogrens       Vital signs:  Temp: 98.7  F (37.1  C) Temp src: Oral BP: 145/84 Pulse: 57   Resp: 18 SpO2: 95 %     Height: 160 cm (5' 3\") Weight: 147.7 kg (325 lb 9.6 oz)  Estimated body mass index is 57.68 kg/m  as calculated from the following:    Height as of this encounter: 1.6 m (5' 3\").    Weight as of this encounter: 147.7 kg (325 lb 9.6 oz).          Tatiana Moscoso [facility]  4/9/2019 9:28 AM      "

## 2019-04-09 NOTE — LETTER
"2019      RE: Angella Jasmine  [address] [facility] [address]       Ms. Kenroy kay 71 y.o. female returns for re-evaluation and mangement of Sjogren's disease associated with arthritis.  CHIKI+, SSA+, SSB-, dsDNA-, RF+ (previously, now negative), HCV-, Cryo-; lip biopsy positive for SS.    She will use refresh eye drops for dry eyes. No arthritis to report. She has intentionally lost weight due to improved diet and new exercise.     Her knee comfort is stable to improved with weight loss. Her hand joints are generally comfortable, but she has right 3rd digit DIP swelling, redness and warmth. Her left thumb is also swollen.  Energy is good and no AM stiffness.     Mouth dryness is stable, only rare dysphagia for dry food. She admits to stable submandibular swelling but no pain.     Hydroxychloroquine 400 mg daily with normal eye check. Cevimeline 30 mg BID-TID with good tolerance and benefit. Vitamin D 50,000 units weekly.    PMI:  Medical-HTN, hypothyroidism, sleep apnea on CPAP, obesity, vitamin D deficiency, gout, Sjogren's, sacroiliac joint dysfunction  Surgical-bilateral partial knee arthroplasty  Injuries-none     SH:  Retired. 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 No tobacco or EtOH. Right handed.     FH:  Sister may have SS and RA  Brother with kidney cancer  Cancer in the family  Mother  with cancer, also had RA  Father  with colon cancer  Children are healthy  Daughter with blood clots, uterine cancer s/p hysterectomy    PMSF history personally obtained and updated by me this visit.    ROS:  +episode of esophageal spasm  +chronic low back pain  +Allergy to pcn, codiene, prednisone, sulfa  Remainder of the 14 point ROS obtained and found negative.     PHYSICAL EXAM:   Blood pressure 145/84, pulse 57, temperature 98.7  F (37.1  C), temperature source Oral, resp. rate 18, height 1.6 m (5' 3\"), weight 147.7 kg (325 lb 9.6 oz), SpO2 95 " %.  Constitutional: Well dress and groomed, calm sitting in exam chair, cooperative, +morbid obesity, difficulty moving to table, uses a cane  Eyes: Wearing glasses. nl EOM, PERRLA, conjunctiva and sclera clear  ENT: nl nose, hearing, lips, teeth, gums, throat; +parotid swelling without tenderness to palpation  Neck: supple; no masses appreciated.  Resp: lungs clear to auscultation without wheezing or rhonchi  CV: RRR, no murmurs, rubs or gallops, no edema  GI: obese, soft, no mass or tenderness  : not tested  Lymph: no cervical nodes appreciated  MS: +Very subtle heberden's nodes DIP on second digit bilaterally. Neck rotation to 60 degrees bilaterally; Bilateral knees with 90 flexion; All other neck, shoulder, elbow, wrist, hand, knee, ankle, and foot joints were examined and otherwise found normal. Normal  strength, tuck and prayer. No active synovitis or deformity.   Skin: no rash, telangectasia, calcinosis, alopecia.  Neuro: nl cranial nerves, strength, sensation; no LE DTRs  Psych: nl judgement, orientation, memory, affect    Laboratory:    Component      Latest Ref Rng & Units 4/9/2019   WBC      4.0 - 11.0 10e9/L 4.3   RBC Count      3.8 - 5.2 10e12/L 4.98   Hemoglobin      11.7 - 15.7 g/dL 14.2   Hematocrit      35.0 - 47.0 % 43.9   MCV      78 - 100 fl 88   MCH      26.5 - 33.0 pg 28.5   MCHC      31.5 - 36.5 g/dL 32.3   RDW      10.0 - 15.0 % 14.1   Platelet Count      150 - 450 10e9/L 190   Diff Method       Automated Method   % Neutrophils      % 65.3   % Lymphocytes      % 18.3   % Monocytes      % 12.0   % Eosinophils      % 3.0   % Basophils      % 0.7   % Immature Granulocytes      % 0.7   Nucleated RBCs      0 /100 0   Absolute Neutrophil      1.6 - 8.3 10e9/L 2.8   Absolute Lymphocytes      0.8 - 5.3 10e9/L 0.8   Absolute Monocytes      0.0 - 1.3 10e9/L 0.5   Absolute Eosinophils      0.0 - 0.7 10e9/L 0.1   Absolute Basophils      0.0 - 0.2 10e9/L 0.0   Abs Immature Granulocytes      0 - 0.4  10e9/L 0.0   Absolute Nucleated RBC       0.0   Creatinine      0.52 - 1.04 mg/dL 1.08 (H)   GFR Estimate      >60 mL/min/1.73:m2 51 (L)   GFR Estimate If Black      >60 mL/min/1.73:m2 59 (L)   AST      0 - 45 U/L 20   ALT      0 - 50 U/L 24     Impression:    Sjogren's Disease-associated with mild arthritis, mild lymphopenia, and sicca. No evidence of active systemic inflammatory disease today. Good tolerance of the combination of hydroxychloroquine and cevimuline and we will continue this regimen.     Vitamin D insufficiency-continue vitamin D to 50,000 units very other week.    Long term management of immunosuppression-with stable lab testing. Give shingrix immunization today.    Plan RTC in 1 year.         Grey Owens MD

## 2019-04-09 NOTE — LETTER
Date: 2020    Angella Jasmine  [address] [place] [id number]      MRN: [id number]  : 1945    Dx:    ICD-10-CM    1. Long-term use of immunosuppressant medication  Z79.899 Rheumatoid factor     Cryoglobulin quantitative     Complement C3     Complement C4     CRP inflammation     Erythrocyte sedimentation rate auto   2. Encounter for vaccination  Z23 Rheumatoid factor     Cryoglobulin quantitative     Complement C3     Complement C4     CRP inflammation     Erythrocyte sedimentation rate auto     zoster vaccine recombinant adjuvanted (SHINGRIX) injection 0.5 mL     hydroxychloroquine (PLAQUENIL) 200 MG tablet   3. Sjogren's syndrome without extraglandular involvement (H)  M35.00 Rheumatoid factor     Cryoglobulin quantitative     Complement C3     Complement C4     CRP inflammation     Erythrocyte sedimentation rate auto   4. Sjogren's syndrome with keratoconjunctivitis sicca (H)  M35.01 Rheumatoid factor     Cryoglobulin quantitative     Complement C3     Complement C4     CRP inflammation     Erythrocyte sedimentation rate auto   5. Vitamin D insufficiency  E55.9 Rheumatoid factor     Cryoglobulin quantitative     Complement C3     Complement C4     CRP inflammation     Erythrocyte sedimentation rate auto       Orders Placed This Encounter     Rheumatoid factor     Standing Status:   Future - Once     Standing Expiration Date:   2020     Cryoglobulin quantitative     Standing Status:   Future - Once     Standing Expiration Date:   2020     Complement C3     Standing Status:   Future - Once     Standing Expiration Date:   2020     Complement C4     Standing Status:   Future - Once     Standing Expiration Date:   2020     CRP inflammation     Standing Status:   Future - Once     Standing Expiration Date:   2020     Erythrocyte sedimentation rate auto     Standing Status:   Future - Once     Standing Expiration Date:   2020       Fax results to  [phone]    Sincerely,    Grey Owens MD/la  Professor of Medicine   Director, Division of Rheumatic and Autoimmune Diseases  [address] [place] [id number]

## 2019-04-09 NOTE — PROGRESS NOTES
"Ms. Jasmine a 71 y.o. female returns for re-evaluation and mangement of Sjogren's disease associated with arthritis.  CHIKI+, SSA+, SSB-, dsDNA-, RF+ (previously, now negative), HCV-, Cryo-; lip biopsy positive for SS.    She will use refresh eye drops for dry eyes. No arthritis to report. She has intentionally lost weight due to improved diet and new exercise.     Her knee comfort is stable to improved with weight loss. Her hand joints are generally comfortable, but she has right 3rd digit DIP swelling, redness and warmth. Her left thumb is also swollen.  Energy is good and no AM stiffness.     Mouth dryness is stable, only rare dysphagia for dry food. She admits to stable submandibular swelling but no pain.     Hydroxychloroquine 400 mg daily with normal eye check. Cevimeline 30 mg BID-TID with good tolerance and benefit. Vitamin D 50,000 units weekly.    PMI:  Medical-HTN, hypothyroidism, sleep apnea on CPAP, obesity, vitamin D deficiency, gout, Sjogren's, sacroiliac joint dysfunction  Surgical-bilateral partial knee arthroplasty  Injuries-none     SH:  Retired. 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 No tobacco or EtOH. Right handed.     FH:  Sister may have SS and RA  Brother with kidney cancer  Cancer in the family  Mother  with cancer, also had RA  Father  with colon cancer  Children are healthy  Daughter with blood clots, uterine cancer s/p hysterectomy    PMSF history personally obtained and updated by me this visit.    ROS:  +episode of esophageal spasm  +chronic low back pain  +Allergy to pcn, codiene, prednisone, sulfa  Remainder of the 14 point ROS obtained and found negative.     PHYSICAL EXAM:   Blood pressure 145/84, pulse 57, temperature 98.7  F (37.1  C), temperature source Oral, resp. rate 18, height 1.6 m (5' 3\"), weight 147.7 kg (325 lb 9.6 oz), SpO2 95 %.  Constitutional: Well dress and groomed, calm sitting in exam chair, cooperative, " +morbid obesity, difficulty moving to table, uses a cane  Eyes: Wearing glasses. nl EOM, PERRLA, conjunctiva and sclera clear  ENT: nl nose, hearing, lips, teeth, gums, throat; +parotid swelling without tenderness to palpation  Neck: supple; no masses appreciated.  Resp: lungs clear to auscultation without wheezing or rhonchi  CV: RRR, no murmurs, rubs or gallops, no edema  GI: obese, soft, no mass or tenderness  : not tested  Lymph: no cervical nodes appreciated  MS: +Very subtle heberden's nodes DIP on second digit bilaterally. Neck rotation to 60 degrees bilaterally; Bilateral knees with 90 flexion; All other neck, shoulder, elbow, wrist, hand, knee, ankle, and foot joints were examined and otherwise found normal. Normal  strength, tuck and prayer. No active synovitis or deformity.   Skin: no rash, telangectasia, calcinosis, alopecia.  Neuro: nl cranial nerves, strength, sensation; no LE DTRs  Psych: nl judgement, orientation, memory, affect    Laboratory:    Component      Latest Ref Rng & Units 4/9/2019   WBC      4.0 - 11.0 10e9/L 4.3   RBC Count      3.8 - 5.2 10e12/L 4.98   Hemoglobin      11.7 - 15.7 g/dL 14.2   Hematocrit      35.0 - 47.0 % 43.9   MCV      78 - 100 fl 88   MCH      26.5 - 33.0 pg 28.5   MCHC      31.5 - 36.5 g/dL 32.3   RDW      10.0 - 15.0 % 14.1   Platelet Count      150 - 450 10e9/L 190   Diff Method       Automated Method   % Neutrophils      % 65.3   % Lymphocytes      % 18.3   % Monocytes      % 12.0   % Eosinophils      % 3.0   % Basophils      % 0.7   % Immature Granulocytes      % 0.7   Nucleated RBCs      0 /100 0   Absolute Neutrophil      1.6 - 8.3 10e9/L 2.8   Absolute Lymphocytes      0.8 - 5.3 10e9/L 0.8   Absolute Monocytes      0.0 - 1.3 10e9/L 0.5   Absolute Eosinophils      0.0 - 0.7 10e9/L 0.1   Absolute Basophils      0.0 - 0.2 10e9/L 0.0   Abs Immature Granulocytes      0 - 0.4 10e9/L 0.0   Absolute Nucleated RBC       0.0   Creatinine      0.52 - 1.04 mg/dL  1.08 (H)   GFR Estimate      >60 mL/min/1.73:m2 51 (L)   GFR Estimate If Black      >60 mL/min/1.73:m2 59 (L)   AST      0 - 45 U/L 20   ALT      0 - 50 U/L 24     Impression:    Sjogren's Disease-associated with mild arthritis, mild lymphopenia, and sicca. No evidence of active systemic inflammatory disease today. Good tolerance of the combination of hydroxychloroquine and cevimuline and we will continue this regimen.     Vitamin D insufficiency-continue vitamin D to 50,000 units very other week.    Long term management of immunosuppression-with stable lab testing. Give shingrix immunization today.    Plan RTC in 1 year.

## 2020-03-18 DIAGNOSIS — G47.33 OSA (OBSTRUCTIVE SLEEP APNEA): Primary | ICD-10-CM

## 2020-03-25 ENCOUNTER — TELEPHONE (OUTPATIENT)
Dept: RHEUMATOLOGY | Facility: CLINIC | Age: 75
End: 2020-03-25

## 2020-03-25 NOTE — TELEPHONE ENCOUNTER
Pt was contacted and offered to have her upcoming appointment with Dr Owens on 3/31/2020 done via a telephone visit. Angella is agreeable to this plan. She will need to have labs done prior to  is appointment so [phone] per pt's request. Their phone number is [phone].    JANNETTE PressleyN RN  Rheumatology Care Coordinator  [facility]

## 2020-04-07 ENCOUNTER — VIRTUAL VISIT (OUTPATIENT)
Dept: RHEUMATOLOGY | Facility: CLINIC | Age: 75
End: 2020-04-07
Attending: INTERNAL MEDICINE
Payer: MEDICARE

## 2020-04-07 DIAGNOSIS — Z23 ENCOUNTER FOR VACCINATION: ICD-10-CM

## 2020-04-07 DIAGNOSIS — M35.00 SJOGREN'S SYNDROME WITHOUT EXTRAGLANDULAR INVOLVEMENT (H): Primary | ICD-10-CM

## 2020-04-07 DIAGNOSIS — M35.01 SJOGREN'S SYNDROME WITH KERATOCONJUNCTIVITIS SICCA (H): ICD-10-CM

## 2020-04-07 DIAGNOSIS — Z79.60 LONG-TERM USE OF IMMUNOSUPPRESSANT MEDICATION: ICD-10-CM

## 2020-04-07 DIAGNOSIS — E55.9 VITAMIN D INSUFFICIENCY: ICD-10-CM

## 2020-04-07 RX ORDER — CEVIMELINE HYDROCHLORIDE 30 MG/1
30 CAPSULE ORAL 3 TIMES DAILY
Qty: 270 CAPSULE | Refills: 3 | Status: SHIPPED | OUTPATIENT
Start: 2020-04-07 | End: 2021-04-13

## 2020-04-07 RX ORDER — HYDROXYCHLOROQUINE SULFATE 200 MG/1
200 TABLET, FILM COATED ORAL 2 TIMES DAILY
Qty: 180 TABLET | Refills: 3 | Status: SHIPPED | OUTPATIENT
Start: 2020-04-07 | End: 2021-04-13

## 2020-04-07 RX ORDER — ERGOCALCIFEROL 1.25 MG/1
50000 CAPSULE, LIQUID FILLED ORAL
Qty: 6 CAPSULE | Refills: 3 | Status: SHIPPED | OUTPATIENT
Start: 2020-04-07 | End: 2021-04-06

## 2020-04-07 ASSESSMENT — PAIN SCALES - GENERAL: PAINLEVEL: NO PAIN (0)

## 2020-04-07 NOTE — PROGRESS NOTES
"Angella Jasmine is a 74 year old female who is being evaluated via a billable telephone visit.      The patient has been notified of following:     \"This telephone visit will be conducted via a call between you and your physician/provider. We have found that certain health care needs can be provided without the need for a physical exam.  This service lets us provide the care you need with a short phone conversation.  If a prescription is necessary we can send it directly to your pharmacy.  If lab work is needed we can place an order for that and you can then stop by our lab to have the test done at a later time.    If during the course of the call the physician/provider feels a telephone visit is not appropriate, you will not be charged for this service.\"     Patient has given verbal consent for Telephone visit?  YES    Angella Jasmine complains of    Chief Complaint   Patient presents with     RECHECK     follow up sjogrens       I have reviewed and updated the patient's Past Medical History, Social History, Family History and Medication List.    ALLERGIES  Allopurinol; Cephalosporins; Codeine; Erythromycin; Hydrochlorothiazide; Penicillins; Prednisone; and Sulfa drugs    Ms. Jasmine has Sjogren's disease associated with arthritis.  CHIKI+, SSA+, SSB-, dsDNA-, RF+ (previously, now negative), HCV-, Cryo-; lip biopsy positive for SS.    No recent dysphagia. Her mouth dryness is stable and no problems with the teeth. She will use OTC drops for her eyes. No parotid gland swelling.     Her joints are generally good. No analgesics are being used. Her joint function is stable. Her weight continues to be too high to exercise effectively.    She tolerates her medicines well when taking with food. Normal eye check in September.     Hydroxychloroquine 400 mg daily with normal eye check in September 2019. Cevimeline 30 mg BID most days with good tolerance and benefit. Vitamin D 50,000 units weekly.    PMI:  Medical-HTN, hypothyroidism, " sleep apnea on CPAP, obesity, vitamin D deficiency, gout, Sjogren's, sacroiliac joint dysfunction  Surgical-bilateral partial knee arthroplasty  Injuries-none     SH:  Retired. 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 No tobacco or EtOH. Right handed.     FH:  Sister may have SS and RA  Brother with kidney cancer  Cancer in the family  Mother  with cancer, also had RA  Father  with colon cancer  Children are healthy  Daughter with blood clots, uterine cancer s/p hysterectomy    PMSF history personally obtained and updated by me this visit.    ROS:  +anxiety with COVID crisis  +Allergy to pcn, codiene, prednisone, sulfa  Remainder of the 14 point ROS obtained and found negative.    Laboratory:    CRP 3.3., nl Complements, -RF, ESR 17    Impression:    Sjogren's Disease-associated with mild arthritis, mild lymphopenia, and sicca. This appears to be very stable and I see no symptoms to suggest progression. Laboratory testing continues to predict milder disease process Good tolerance of the evoxac and hydroxychloroquine, and we will continue this regimen.     Vitamin D insufficiency-we will continue the supplement and check the level.    Long term management of immunosuppression-with stable lab testing.  Repeat lab testing in 6 months. We also agree that the benefits of continued immunosuppression outweigh the risks of COVID-19 infection.     Plan RTC in 1 year.     Phone call duration: 15 minutes    Grey Owens MD

## 2020-04-07 NOTE — PATIENT INSTRUCTIONS
Continue the hydroxychloroquine 2 tablets daily  Continue the cevimuline 2 to 3 tablets daily  Repeat lab testing in 6 months  Repeat eye check in September  Follow up with me in 1 year

## 2020-04-14 ENCOUNTER — TELEPHONE (OUTPATIENT)
Dept: SLEEP MEDICINE | Facility: CLINIC | Age: 75
End: 2020-04-14

## 2020-04-14 NOTE — TELEPHONE ENCOUNTER
Patient's insurance switched to Medicare as of 1/1/20 and needs a new face to face or telehealth appointment in order to get CPAP supplies. Patient stated she left the clinic a message but have not gotten a call to schedule an appointment. Will route message to Dr. Maurice to have clinic call patient.

## 2020-04-28 VITALS — BODY MASS INDEX: 57.52 KG/M2 | WEIGHT: 293 LBS | HEIGHT: 60 IN

## 2020-04-28 RX ORDER — LEVOTHYROXINE SODIUM 200 UG/1
200 TABLET ORAL DAILY
COMMUNITY
Start: 2020-01-28

## 2020-04-28 ASSESSMENT — MIFFLIN-ST. JEOR: SCORE: 1873.01

## 2020-04-28 NOTE — PROGRESS NOTES
"Angella Jasmine is a 74 year old female who is being evaluated via a billable telephone visit.      The patient has been notified of following:     \"This telephone visit will be conducted via a call between you and your physician/provider. We have found that certain health care needs can be provided without the need for a physical exam.  This service lets us provide the care you need with a short phone conversation.  If a prescription is necessary we can send it directly to your pharmacy.  If lab work is needed we can place an order for that and you can then stop by our lab to have the test done at a later time.    Telephone visits are billed at different rates depending on your insurance coverage. During this emergency period, for some insurers they may be billed the same as an in-person visit.  Please reach out to your insurance provider with any questions.    If during the course of the call the physician/provider feels a telephone visit is not appropriate, you will not be charged for this service.\"    Patient has given verbal consent for Telephone visit?  Yes    How would you like to obtain your AVS? E-Mail (inform patient AVS not encrypted)     Chief complaint: Follow-up sleep apnea, needs CPAP supplies    History of Present Illness: 74-year-old female with history of severe obstructive sleep apnea.  She needs a face-to-face or telehealth visit in order to get CPAP supplies.  She reports no major changes in her health since last visit.  Due to the COVID-19 pandemic she is noticing difficulty with waking up at night and having some difficulty returning to sleep.  She admits to some anxieties about all the turmoil going on in the world.  She is taking naps most days sometimes as long as an hour.  She has not been losing weight.  She not getting regular exercise.  Known sleepwalking sleep talking or dream enactment behavior.  No restless legs.  Typical bedtime is 1130 but varies.  Last night she went to bed at 1230 " AM.  She is not using any sleep aids.  She drinks about 2 cups of coffee daily.  She uses a nasal style CPAP mask.  Denies any problem with the pressure.    Total score - Randolph: 8 (4/28/2020  9:00 AM) (Less than 10 normal)    KIKO Total Score: 11 (normal 0-7, mild 8-14, moderate 15-21, severe 22-28)    Past Medical History:   Diagnosis Date     Allergic rhinitis due to other allergen      Body mass index 40 and over, adult      CKD (chronic kidney disease), stage III (H)      Dermatophytosis of nail      Diverticulosis of colon (without mention of hemorrhage)      Essential hypertension, benign      Localized osteoarthrosis not specified whether primary or secondary, lower leg 2/24/08    Hospitalized     Obstructive sleep apnea     severe     Primary localized osteoarthrosis, lower leg 8/30/08    Hospitalized     Sjogren's syndrome (H)      Symptomatic menopausal or female climacteric states      Unspecified hypothyroidism        Allergies   Allergen Reactions     Allopurinol Hives     Cephalosporins      suprax   hives     Codeine      HALLUCINATIONS     Erythromycin      stomach pain     Hydrochlorothiazide Nausea     very irritated stomach and a lot of itching in mouth.     Penicillins      severe stomach reaction and hives     Prednisone      legs swelling     Sulfa Drugs      hives       Current Outpatient Medications   Medication     amLODIPine (NORVASC) 5 MG tablet     Artificial Tear Solution (SOOTHE XP) SOLN     atenolol (TENORMIN) 100 MG tablet     cevimeline (EVOXAC) 30 MG capsule     clindamycin (CLEOCIN) 300 MG capsule     febuxostat (ULORIC) 40 MG TABS     hydroxychloroquine (PLAQUENIL) 200 MG tablet     levothyroxine (SYNTHROID, LEVOTHROID) 175 MCG tablet     levothyroxine (SYNTHROID/LEVOTHROID) 200 MCG tablet     melatonin 5 MG tablet     ORDER FOR DME     vitamin D2 (ERGOCALCIFEROL) 80786 units (1250 mcg) capsule     No current facility-administered medications for this visit.        Social History  "    Socioeconomic History     Marital status:      Spouse name: Guero     Number of children: 2     Years of education: 14     Highest education level: Not on file   Occupational History     Occupation: homemaker   Social Needs     Financial resource strain: Not on file     Food insecurity     Worry: Not on file     Inability: Not on file     Transportation needs     Medical: Not on file     Non-medical: Not on file   Tobacco Use     Smoking status: Former Smoker     Packs/day: 0.25     Years: 2.00     Pack years: 0.50     Types: Cigarettes     Last attempt to quit: 1973     Years since quittin.2     Smokeless tobacco: Never Used     Tobacco comment: no second hand smoke at home or work   Substance and Sexual Activity     Alcohol use: Yes     Alcohol/week: 0.4 standard drinks     Comment: rarely     Drug use: Never     Sexual activity: Not on file   Lifestyle     Physical activity     Days per week: Not on file     Minutes per session: Not on file     Stress: Not on file   Relationships     Social connections     Talks on phone: Not on file     Gets together: Not on file     Attends Christian service: Not on file     Active member of club or organization: Not on file     Attends meetings of clubs or organizations: Not on file     Relationship status: Not on file     Intimate partner violence     Fear of current or ex partner: Not on file     Emotionally abused: Not on file     Physically abused: Not on file     Forced sexual activity: Not on file   Other Topics Concern      Service No     Blood Transfusions No     Caffeine Concern Yes     Comment: 4 cups of coffee a day     Occupational Exposure No     Hobby Hazards No     Sleep Concern Yes     Comment: have problems with sleeping \"I get my brain going and can't seem to turn it off\"     Stress Concern No     Weight Concern Yes     Comment: dieting now     Special Diet No     Back Care Yes     Comment: back aches sometimes     Exercise Yes "     Comment: leg exercises     Bike Helmet Not Asked     Seat Belt Yes     Self-Exams Yes     Parent/sibling w/ CABG, MI or angioplasty before 65F 55M? Not Asked   Social History Narrative     Not on file       Family History   Problem Relation Age of Onset     Hypertension Daughter      Thyroid Disease Mother      Arthritis Mother      Cancer Mother         Stomach & Ovarian cancer     Breast Cancer Mother         Bilateral     Hypertension Mother      Cancer - colorectal Father         Around age 65     Thyroid Disease Sister      Hypertension Sister      Obesity Sister      Allergies Sister      Hypertension Brother      Hypertension Brother      Hypertension Brother      Cancer Maternal Aunt         Breast     Cancer Other         Maternal cousin with bilateral breast ca     Cancer - colorectal Paternal Uncle      Diabetes No family hx of      Cerebrovascular Disease No family hx of      Anesthesia Reaction No family hx of      Blood Disease No family hx of      Heart Disease No family hx of      Lipids No family hx of      Musculoskeletal Disorder No family hx of      Neurologic Disorder No family hx of      Osteoporosis No family hx of      Respiratory No family hx of          EXAM: Alert no distress  Ht 1.524 m (5')   Wt 145.2 kg (320 lb)   BMI 62.50 kg/m    Psychiatric: Mood and affect appear normal    PSG 1/14/2007  Weight 255, BMI 44.5  AHI 43, RDI 44.1 REM RDI 92.6    Titration PSG 2/3/2007 Failed  Repeat Titration 4/1/2007 CPAP 12    PAP download from 2/17/2020 to 4/16/2020 reviewed:  Per cent of days used greater than 4 Hours 93 % (minimum goal greater than 70%)  Average use on days used: 5 hours  28 min  Settings: Min EPAP 12 cmH2O    Max EPAP 18 cmH2O  Pressures delivered 90/95th percentile for pressure 15.3 cmH2O  Average AHI 9.5 events per hour (goal less than 5)  Leak acceptable    ASSESSMENT:  74-year-old female with history of severe REM predominant obstructive sleep apnea, severe obesity,  getting good clinical benefit and meeting compliance goals with CPAP.  Needs updated prescription for supplies.  We are tolerating slightly elevated AHI.    PLAN:  Prescription generated to keep supplies up-to-date.  No changes in her settings.  Urged her to increase efforts at weight control with dietary changes and increased activity.  Made recommendations and counseled regarding improving sleep routine and stress management.  Recommended regularizing bedtime between 1130 PM and 12:30 AM.  And she should avoid naps altogether, if possible.  If she has a did not make it short less than 10 minutes or so.  Please see patient instructions for further details of counseling provided.  Patient should follow-up in 1 year earlier if issues arise.      Jacquelin Maurice M.D.  Pulmonary/Critical Care/Sleep Medicine    [facility]   Floor 1, [address]   [address] [place] [address]. [place] [address]   Appointments: [phone]    The above note was dictated using voice recognition software and may include typographical errors. Please contact the author for any clarifications.            Phone call duration: 10 minutes    Jacquelin Maurice MD

## 2020-04-28 NOTE — PATIENT INSTRUCTIONS
"Avoid naps during the day-try to keep a regular bedtime 11:30 PM to 12:30 AM okay  I put in an order for CPAP supplies  Try to get bright light exposure  Increase exercise    STRESS AND SLEEP    Its no surprise that stress can affect sleep. It is also true that if you sleep better you can also handle stress better.  If left untreated, sleep disorders are also associated with increased risk of onset of depression and anxiety and other health risks.    During this disruptive and uncertain time in our lives due the coronavirus (COVID-19), it is especially important to tend to your emotional and physical wellbeing.   Here are a few tips and resources to consider:    1. Stay active    It s pretty well known that exercise is really good for both our physical and mental health. There s heaps of different types of exercise you can do from home, thanks to [employer] and apps.  Do what fits your needs and health circumstances.    2. Practice good sleep habits      Keep a consistent sleep schedule and allow 7-8 hours for sleep.    Only use your bed for sleep and sex.    Wind down before bed.    Avoid using electronics and mobile devices an hour before bedtime or in bed.    Avoid caffeine use within six hours of your bedtime.     Avoid alcohol use within three hours of your bedtime.    2. Take 5 to chill.    When we re stressed about something (such as coronavirus), our thoughts, heart-rate and breathing tend to speed up. Taking even 5 minutes or so to practice mindfulness can help produce a sense of calmness. What the heck is \"mindfulness\" you ask?  Basically it is any activity, be it meditation, sitting in a relaxed position and taking in the experience and sensations of the present moment, or enjoying relaxed breathing.    If you are experiencing insomnia, the free [employer]  mobile deisy has great relaxation tools in their Tools section including:      Breathing Tool    Progressive Muscle Relaxation    Body Scan    Mindfulness " "Exercise    Guided Imagery ([place], [place] or [place])    Insight Timer and Calm apps have thousands of free guided meditations and exercise on a variety of topics that may be of interest such as managing stress, sleep, and meditation.    3. Chat with your friends and family.    Even if an in-person meet-up is off the table, try to stay in touch via text, Skype, Messenger, WhatsApp, FaceTime, or phone call. Ask them how they re feeling and share your own experience if you feel safe to do so.    5. Take a break from the news    Between the news and social media, we are all feeling saturated by news updates and \"breaking news.\"  It s important to stay informed, but try to limit your media intake to a couple of times a day and use trusted news sources. If you catch yourself turning to social media because you re feeling isolated, take a break and spend time on another activity.    6. Listen to Music    Music can make us feel so much better. If you don't have a music playlist on your phone, consider making one.     7. Watch or read something uplifting    Distraction can be a good thing. Watch something that you find uplifting and allow yourself to zone out from what s going on in the world. [employer] is a great option too.     8. Learn something new    Have you wanted to get into drawing or learning a musical instrument? Now s a great time to make a start. [employer] has great free online tutorials for pretty much everything.    Helpful Advice from the Sleep Foundation    Copy/paste or type into your browser:    www.sleepfoundation.org/sleep-guidelines-covid-19-isolation      Stress Management and Relaxation Books      The Relaxation and Stress Reduction Workbook by Naila Sanchez, Madonna Hernandez and Aroldo Ackerman (2008)    Stress Management Workbook: Techniques and Self-Assessment Procedures by Doris Pagan and Paul Latif (1997)    A Mindfulness-Based Stress Reduction Workbook by Abelardo Kaplan and " Jennifer Beebe (2010)    The Complete Stress Management Workbook by Jay Boswell, Meir Avila and Vicente Coates (1996)    Your BMI is Body mass index is 62.5 kg/m .  Weight management is a personal decision.  If you are interested in exploring weight loss strategies, the following discussion covers the approaches that may be successful. Body mass index (BMI) is one way to tell whether you are at a healthy weight, overweight, or obese. It measures your weight in relation to your height.  A BMI of 18.5 to 24.9 is in the healthy range. A person with a BMI of 25 to 29.9 is considered overweight, and someone with a BMI of 30 or greater is considered obese. More than two-thirds of American adults are considered overweight or obese.  Being overweight or obese increases the risk for further weight gain. Excess weight may lead to heart disease and diabetes.  Creating and following plans for healthy eating and physical activity may help you improve your health.  Weight control is part of healthy lifestyle and includes exercise, emotional health, and healthy eating habits. Careful eating habits lifelong are the mainstay of weight control. Though there are significant health benefits from weight loss, long-term weight loss with diet alone may be very difficult to achieve- studies show long-term success with dietary management in less than 10% of people. Attaining a healthy weight may be especially difficult to achieve in those with severe obesity. In some cases, medications, devices and surgical management might be considered.  What can you do?  If you are overweight or obese and are interested in methods for weight loss, you should discuss this with your provider.     Consider reducing daily calorie intake by 500 calories.     Keep a food journal.     Avoiding skipping meals, consider cutting portions instead.    Diet combined with exercise helps maintain muscle while optimizing fat loss. Strength training is  particularly important for building and maintaining muscle mass. Exercise helps reduce stress, increase energy, and improves fitness. Increasing exercise without diet control, however, may not burn enough calories to loose weight.       Start walking three days a week 10-20 minutes at a time    Work towards walking thirty minutes five days a week     Eventually, increase the speed of your walking for 1-2 minutes at time    In addition, we recommend that you review healthy lifestyles and methods for weight loss available through the National Institutes of Health patient information sites:  http://win.niddk.nih.gov/publications/index.htm    And look into health and wellness programs that may be available through your health insurance provider, employer, local community center, or gregory club.    Weight management plan: Patient was referred to their PCP to discuss a diet and exercise plan.  1.  Continue CPAP every night, for all hours that you are sleeping.  If you nap use CPAP.  As always, try to get at least 8 hours of sleep or more each day, and avoid sleep deprivation. Avoid alcohol.    2.  Reasons that you might need a change to your pressure therapy would be weight gain or loss, waking having inadvertently removed your CPAP overnight, having previously felt refreshed by sleep with CPAP use and now waking un-refreshed, and return of daytime sleepiness. Also, the development of new medical problems can sometimes affect breathing at night-heart failure, stroke, medications such as narcotics.    3.  Please bring CPAP with you if you are hospitalized.  If anticipating surgery be sure to discuss with your surgeon that you have sleep apnea and use PAP therapy.      4.  Maintain your equipment as recommended which includes routine cleaning and replacement of supplies.  Call DME for any questions regarding supplies or maintenance.    [facility], [facility] [phone]    5.  Do  not drive on engage in potentially dangerous activities if feeling sleepy.    6.  Please see me again in 12 months and bring your machine and card to your follow-up visit.                    Tips for your CPAP and BIPAP use-    Mask fitting tips  Mask fitting exercise:    To improve your mask seal and your mobility at night, put mask on and secure in place.  Lie down in bed with full pressure and roll to one side, adjust headgear while in that position to eliminate any leaks. Repeat process rolling to other side.     The mask seal does not have to be perfect:   CPAP machines are designed to make up for small leaks. However, you will not tolerate leaks blowing in your eyes so you will need to adjust.   Any leak should only be near or at the bottom of the mask.  We expect your mask to leak slightly at night.    Do not over-tighten the headgear straps, tighter IS NOT better, we expect minimal leak.    First try re-positioning the mask or headgear before tightening the headgear straps.  Mask leaks are expected due to changing sleeping positions. Try pulling the mask away from your skin allowing the cushion to re-inflate will minimize the leak.  If you struggle for a good fit, try turning the CPAP off and then readjust the mask by pulling it away from your face and then turning back on the CPAP.        Humidifier tips  Humidifiers can be adjusted to increase or decrease the amount of moisture according to your comfort level. You may need to adjust this frequently at first, but then might only change it with seasonal weather changes.     Try INCREASING the humidity if:  You experience a dry, irritated nasal passage or throat.  You have a runny, drippy nose or sneezing fits after using CPAP.  You experience nasal congestion during or after CPAP use.    Try DECREASING the humidity if:  You have excessive condensation or  rain out  in the tubing or mask.  Otherwise keep the tubing warm during the night by running it  underneath the blankets or pillow.      Clinic visit after initial CPAP and BIPAP set-up   Bring your equipment with you to your 4 week follow up clinic visit.  We will be extracting your data from the machine.        Travel  Always take your equipment with you.  If you fly with your equipment bring it on with you as a carry on.  Medical equipment does not count as a carry on.    If you travel international the machines take 110-240.  The only adapter needed is the adapter that will fit into the receptacle (outlet).    You may also want to bring an extension cord as many hot rooms have limited outlets at the bedside.  Do not travel with water in your humidifier chamber.     Cleaning and Maintenance Guidelines    Equipment Frequency Cleaning Method   Mask First Day    Daily      Weekly Soak mask in hot soapy water for 30 minutes, rinse and air dry.  Wipe nasal cushion with a hot soapy (Ivory, baby shampoo) cloth and rinse.  Baby wipes may also be used.  Do not use anti-bacterial soaps,Nell  liquid soap, rubbing alcohol, bleach or ammonia.  Wash frame in hot soapy water (Ivory, baby shampoo) rinse and let air dry   Headgear Biweekly Wash in hot soapy water, rinse and air dry   Reusable Gray Filter Weekly Wash in hot soapy water, rinse, put in towel squeeze moisture out, let air dry   Disposable White Filter Check Weekly Replace when brown or gray in color; at least every 2 to 3 months   Humidifier Chamber Daily    Weekly Empty distilled water from humidifier and let air dry    Hand wash in hot soapy water, rinse and air dry   Tubing Weekly Wash in hot soapy water, rinse and let air dry   Mask, Tubing and Humidifier Chamber As needed Disinfect: Soak in 1 part distilled white vinegar to 3 parts hot water for 30 minutes, rinse well and air dry  Not the material headgear        MASK AND SUPPLY REORDERING and EQUIPMENT NEEDS through your DME and per your insurance  Reminder: Most insurance companies will allow for a new  mask, headgear, tubing, and reusable gray filter every six months.  Disposable white ultra-fine filters are covered monthly.      HOME AND SAFETY INSTRUCTIONS    Do not use frayed or cracked electrical cords, multi plug adaptors, or switched receptacles    Do not immerse electrical equipment into water    Assure that electrical cords do not become a tripping hazard

## 2020-04-29 ENCOUNTER — VIRTUAL VISIT (OUTPATIENT)
Dept: SLEEP MEDICINE | Facility: CLINIC | Age: 75
End: 2020-04-29
Payer: MEDICARE

## 2020-04-29 DIAGNOSIS — G47.33 OSA (OBSTRUCTIVE SLEEP APNEA): Primary | ICD-10-CM

## 2020-04-29 DIAGNOSIS — E66.01 CLASS 3 SEVERE OBESITY DUE TO EXCESS CALORIES WITH SERIOUS COMORBIDITY AND BODY MASS INDEX (BMI) OF 60.0 TO 69.9 IN ADULT (H): ICD-10-CM

## 2020-04-29 DIAGNOSIS — E66.813 CLASS 3 SEVERE OBESITY DUE TO EXCESS CALORIES WITH SERIOUS COMORBIDITY AND BODY MASS INDEX (BMI) OF 60.0 TO 69.9 IN ADULT (H): ICD-10-CM

## 2020-04-29 PROCEDURE — 99441 ZZC PHYSICIAN TELEPHONE EVALUATION 5-10 MIN GOVT: CPT | Performed by: INTERNAL MEDICINE

## 2021-03-15 ENCOUNTER — DOCUMENTATION ONLY (OUTPATIENT)
Dept: CARE COORDINATION | Facility: CLINIC | Age: 76
End: 2021-03-15

## 2021-04-05 DIAGNOSIS — Z79.60 LONG-TERM USE OF IMMUNOSUPPRESSANT MEDICATION: ICD-10-CM

## 2021-04-05 DIAGNOSIS — M35.00 SJOGREN'S SYNDROME WITHOUT EXTRAGLANDULAR INVOLVEMENT (H): ICD-10-CM

## 2021-04-05 DIAGNOSIS — E55.9 VITAMIN D INSUFFICIENCY: ICD-10-CM

## 2021-04-06 RX ORDER — ERGOCALCIFEROL 1.25 MG/1
50000 CAPSULE, LIQUID FILLED ORAL
Qty: 6 CAPSULE | Refills: 1 | Status: SHIPPED | OUTPATIENT
Start: 2021-04-06 | End: 2021-04-13

## 2021-04-06 NOTE — TELEPHONE ENCOUNTER
vitamin D2 (ERGOCALCIFEROL) 88323 units (1250 mcg) capsule  Last Written Prescription Date:  4/7/2020  Last Fill Quantity: 6,   # refills: 3  Last Office Visit : 4/7/2020  Future Office visit:  4/13/2021    Routing refill request to provider for review/approval because:  Drug not on the [facility], [facility] or [facility] refill protocol or controlled substance      Concha Brennan RN  Central Triage Red Flags/Med Refills

## 2021-04-13 ENCOUNTER — VIRTUAL VISIT (OUTPATIENT)
Dept: RHEUMATOLOGY | Facility: CLINIC | Age: 76
End: 2021-04-13
Attending: INTERNAL MEDICINE
Payer: MEDICARE

## 2021-04-13 DIAGNOSIS — Z79.60 LONG-TERM USE OF IMMUNOSUPPRESSANT MEDICATION: ICD-10-CM

## 2021-04-13 DIAGNOSIS — E55.9 VITAMIN D INSUFFICIENCY: ICD-10-CM

## 2021-04-13 DIAGNOSIS — M35.00 SJOGREN'S SYNDROME WITHOUT EXTRAGLANDULAR INVOLVEMENT (H): ICD-10-CM

## 2021-04-13 PROCEDURE — 99442 PR PHYSICIAN TELEPHONE EVALUATION 11-20 MIN: CPT | Mod: 95 | Performed by: INTERNAL MEDICINE

## 2021-04-13 RX ORDER — ERGOCALCIFEROL 1.25 MG/1
50000 CAPSULE, LIQUID FILLED ORAL
Qty: 6 CAPSULE | Refills: 3 | Status: SHIPPED | OUTPATIENT
Start: 2021-04-13 | End: 2021-12-14

## 2021-04-13 RX ORDER — CEVIMELINE HYDROCHLORIDE 30 MG/1
30 CAPSULE ORAL 3 TIMES DAILY
Qty: 270 CAPSULE | Refills: 3 | Status: SHIPPED | OUTPATIENT
Start: 2021-04-13 | End: 2021-12-14

## 2021-04-13 RX ORDER — HYDROXYCHLOROQUINE SULFATE 200 MG/1
200 TABLET, FILM COATED ORAL 2 TIMES DAILY
Qty: 180 TABLET | Refills: 1 | Status: SHIPPED | OUTPATIENT
Start: 2021-04-13 | End: 2021-11-06

## 2021-04-13 ASSESSMENT — PAIN SCALES - GENERAL: PAINLEVEL: NO PAIN (0)

## 2021-04-13 NOTE — LETTER
2021       RE: Angella Jasmine  [address] [address]     Dear Colleague,    Thank you for referring your patient, Angella Jasmine, to the [facility] RHEUMATOLOGY CLINIC [place] [facility]. Please see a copy of my visit note below.    Angella is a 75 year old who is being evaluated via a billable telephone visit.      What phone number would you like to be contacted at? [phone]  How would you like to obtain your AVS? Mail a copy  Phone call duration: 13 minutes      Ms. Jasmine has Sjogren's disease associated with arthritis.  CHIKI+, SSA+, SSB-, dsDNA-, RF+ (previously, now negative), HCV-, Cryo-; lip biopsy positive for SS.    She denies any problems with dysphagia. Cevimeline 30 mg BID with good control of mouth dryness. She will use OTC soothing eye drops. No glandular swelling or soreness. Joints are fine. Energy is pretty. No important AM stiffness.    Hydroxychloroquine 400 mg daily with eye check that is due now. Cevimeline 30 mg BID most days with good tolerance and benefit. Vitamin D 50,000 units twice monthly.    PMI:  Medical-HTN, hypothyroidism, sleep apnea on CPAP, obesity, vitamin D deficiency, gout, Sjogren's, sacroiliac joint dysfunction  Surgical-bilateral partial knee arthroplasty  Injuries-none     SH:  Retired. 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 No tobacco or EtOH. Right handed.     FH:  Sister may have SS and RA  Brother with kidney cancer  Cancer in the family  Mother  with cancer, also had RA  Father  with colon cancer  Children are healthy  Daughter with blood clots, uterine cancer s/p hysterectomy    PMSF history personally obtained and updated by me this visit.    ROS:  +Allergy to pcn, codiene, prednisone, sulfa  Remainder of the 14 point ROS obtained and found negative.    Laboratory:    View Detailed Reports   View Condensed Results Report   Contains  abnormal data Basic Metabolic Panel  Order: [id number]  (suggestion)  Information displayed in this report will not trend or trigger automated decision support.    Ref Range & Units 2wk ago   Potassium, P 3.6 - 5.2 mmol/L 4.6    Sodium, P 135 - 145 mmol/L 141    Chloride, P 98 - 107 mmol/L 106    Bicarbonate, P 22 - 29 mmol/L 25    Anion Gap, P 7 - 15  10    BUN, P 6 - 21 mg/dL 16    Creatinine, P 0.59 - 1.04 mg/dL 1.1High     eGFR Black >=60 mL/min/BSA 57Low     Comment:    ----ADDITIONAL INFORMATION----   Estimated GFR calculated using the 2009 CKD_EPI creatinine   equation.   eGFR Non-Black >=60 mL/min/BSA 49Low     Comment:    ----ADDITIONAL INFORMATION----   Estimated GFR calculated using the 2009 CKD_EPI creatinine   equation.   Calcium, Total, P 8.8 - 10.2 mg/dL 10    Glucose, P 70 - 140 mg/dL 104    Specimen Collected: 03/30/21  9:14 AM Last Resulted: 03/30/21 10:17 AM   Received From: [facility]  Result Received: 04/13/21  7:47 AM    Received Information   Contains abnormal data CBC with Differential, Blood  Order: [id number]  (suggestion)  Information displayed in this report will not trend or trigger automated decision support.    Ref Range & Units 2wk ago   Hemoglobin 11.6 - 15.0 g/dL 13.8    Hematocrit 35.5 - 44.9 % 42.2    Erythrocytes 3.92 - 5.13 x10(12)/L  4.81    MCV 78.2 - 97.9 fL 87.7    RBC Distrib Width 12.2 - 16.1 % 13.8    Platelet Count 157 - 371 x10(9)/L 187    Leukocytes 3.4 - 9.6 x10(9)/L 5.3    Neutrophils 1.56 - 6.45 x10(9)/L 4.17    Lymphocytes 0.95 - 3.07 x10(9)/L 0.52Low     Monocytes 0.26 - 0.81 x10(9)/L 0.44    Eosinophils 0.03 - 0.48 x10(9)/L  0.12    Basophils 0.01 - 0.08 x10(9)/L 0.03    Specimen Collected: 03/30/21  9:14 AM Last Resulted: 03/30/21  9:25 AM   Received From: [facility]  Result Received: 04/13/21  7:47 AM    Received Information   Contains abnormal data Microscopic Automated  Order: [id number]  (suggestion)  Information displayed in this report will not trend or  trigger automated decision support.    Ref Range & Units 2wk ago   White Blood Cells  /hpf 51-100Abnormal     Comment:    ----REFERENCE VALUE----   Males: 0-3   Females: 0-10   Unknown: 0-10   Red Blood Cells 0 - 2 /hpf 3-10Abnormal     Dysmorphic Red Blood Cells <=25 % <=25    Hyaline Casts  /lpf None Seen    Squamous Cells  /hpf 11-20    Bacteria None Seen  PresentAbnormal     Specimen Collected: 03/30/21  8:18 AM Last Resulted: 03/30/21  8:38 AM   Received From: [facility]  Result Received: 04/13/21  7:47 AM    Received Information   Contains abnormal data Urinalysis with Microscopic if Indicated  Order: [id number]  (suggestion)  Information displayed in this report will not trend or trigger automated decision support.    Ref Range & Units 2wk ago   Source   Urine, Urine, Clean Catch    Clarity Clear  CloudyAbnormal     Color   Destiny    Comment:    ----REFERENCE VALUE----   Colorless   Yellow   Destiny   Blood Negative  Negative    Nitrite Negative  PositiveAbnormal     Leukocyte Esterase Negative  ModerateAbnormal     Protein  mg/dL 30Abnormal     Comment:    ----REFERENCE VALUE----   Negative   Trace   Glucose Negative mg/dL Negative    Ketone Negative mg/dL Negative    Bilirubin Negative  Negative    pH 5.0 - 8.0  5    Specific Gravity 1.001 - 1.035  1.018    Urobilinogen 0.2 - 1.0 mg/dL 0.2    Specimen Collected: 03/30/21  8:18 AM Last Resulted: 03/30/21  8:24 AM   Received From: [facility]  Result Received: 04/13/21  7:47 AM    Received Information         Impression:    Sjogren's Disease-associated with mild arthritis, mild lymphopenia, and sicca. She is very well today and I appreciate no signs of systemic inflammation or uncontrolled disease. Good tolerance of the hydroxychloroquine and cevimuline and we will continue this regimen.     Vitamin D insufficiency-we will continue the vitamin D and check the level. Normal DEXA in 2015. If her primary does not order a repeat, next visit we will consider  this.    Long term management of immunosuppression-with stable lab testing.  Repeat lab testing in 6 months. We also agree that the benefits of continued immunosuppression outweigh the risks of COVID-19 infection.     Plan RTC in 1 year.     A total of 20 minutes was spent in face to face patient interaction and chart review on the day of service.    Grey Owens MD

## 2021-04-13 NOTE — PATIENT INSTRUCTIONS
Continue all your current medications  Get your eye check sometime in the next 3 months and send us a copy of the report  Plan for lab testing every 6 months  Ask your primary doctor whether it is time to repeat the bone mineral density test (DEXA)  Follow up with me in 1 year.

## 2021-04-13 NOTE — PROGRESS NOTES
Angella is a 75 year old who is being evaluated via a billable telephone visit.      What phone number would you like to be contacted at? [phone]  How would you like to obtain your AVS? Mail a copy  Phone call duration: 13 minutes      Ms. Jasmine has Sjogren's disease associated with arthritis.  CHIKI+, SSA+, SSB-, dsDNA-, RF+ (previously, now negative), HCV-, Cryo-; lip biopsy positive for SS.    She denies any problems with dysphagia. Cevimeline 30 mg BID with good control of mouth dryness. She will use OTC soothing eye drops. No glandular swelling or soreness. Joints are fine. Energy is pretty. No important AM stiffness.    Hydroxychloroquine 400 mg daily with eye check that is due now. Cevimeline 30 mg BID most days with good tolerance and benefit. Vitamin D 50,000 units twice monthly.    PMI:  Medical-HTN, hypothyroidism, sleep apnea on CPAP, obesity, vitamin D deficiency, gout, Sjogren's, sacroiliac joint dysfunction  Surgical-bilateral partial knee arthroplasty  Injuries-none     SH:  Retired. 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 No tobacco or EtOH. Right handed.     FH:  Sister may have SS and RA  Brother with kidney cancer  Cancer in the family  Mother  with cancer, also had RA  Father  with colon cancer  Children are healthy  Daughter with blood clots, uterine cancer s/p hysterectomy    PMSF history personally obtained and updated by me this visit.    ROS:  +Allergy to pcn, codiene, prednisone, sulfa  Remainder of the 14 point ROS obtained and found negative.    Laboratory:    View Detailed Reports   View Condensed Results Report   Contains abnormal data Basic Metabolic Panel  Order: [id number]  (suggestion)  Information displayed in this report will not trend or trigger automated decision support.    Ref Range & Units 2wk ago   Potassium, P 3.6 - 5.2 mmol/L 4.6    Sodium, P 135 - 145 mmol/L 141    Chloride, P 98 - 107 mmol/L 106    Bicarbonate, P  22 - 29 mmol/L 25    Anion Gap, P 7 - 15  10    BUN, P 6 - 21 mg/dL 16    Creatinine, P 0.59 - 1.04 mg/dL 1.1High     eGFR Black >=60 mL/min/BSA 57Low     Comment:    ----ADDITIONAL INFORMATION----   Estimated GFR calculated using the 2009 CKD_EPI creatinine   equation.   eGFR Non-Black >=60 mL/min/BSA 49Low     Comment:    ----ADDITIONAL INFORMATION----   Estimated GFR calculated using the 2009 CKD_EPI creatinine   equation.   Calcium, Total, P 8.8 - 10.2 mg/dL 10    Glucose, P 70 - 140 mg/dL 104    Specimen Collected: 03/30/21  9:14 AM Last Resulted: 03/30/21 10:17 AM   Received From: [facility]  Result Received: 04/13/21  7:47 AM    Received Information   Contains abnormal data CBC with Differential, Blood  Order: [id number]  (suggestion)  Information displayed in this report will not trend or trigger automated decision support.    Ref Range & Units 2wk ago   Hemoglobin 11.6 - 15.0 g/dL 13.8    Hematocrit 35.5 - 44.9 % 42.2    Erythrocytes 3.92 - 5.13 x10(12)/L  4.81    MCV 78.2 - 97.9 fL 87.7    RBC Distrib Width 12.2 - 16.1 % 13.8    Platelet Count 157 - 371 x10(9)/L 187    Leukocytes 3.4 - 9.6 x10(9)/L 5.3    Neutrophils 1.56 - 6.45 x10(9)/L 4.17    Lymphocytes 0.95 - 3.07 x10(9)/L 0.52Low     Monocytes 0.26 - 0.81 x10(9)/L 0.44    Eosinophils 0.03 - 0.48 x10(9)/L  0.12    Basophils 0.01 - 0.08 x10(9)/L 0.03    Specimen Collected: 03/30/21  9:14 AM Last Resulted: 03/30/21  9:25 AM   Received From: [facility]  Result Received: 04/13/21  7:47 AM    Received Information   Contains abnormal data Microscopic Automated  Order: [id number]  (suggestion)  Information displayed in this report will not trend or trigger automated decision support.    Ref Range & Units 2wk ago   White Blood Cells  /hpf 51-100Abnormal     Comment:    ----REFERENCE VALUE----   Males: 0-3   Females: 0-10   Unknown: 0-10   Red Blood Cells 0 - 2 /hpf 3-10Abnormal     Dysmorphic Red Blood Cells <=25 % <=25    Hyaline Casts  /lpf None Seen     Squamous Cells  /hpf 11-20    Bacteria None Seen  PresentAbnormal     Specimen Collected: 03/30/21  8:18 AM Last Resulted: 03/30/21  8:38 AM   Received From: [facility]  Result Received: 04/13/21  7:47 AM    Received Information   Contains abnormal data Urinalysis with Microscopic if Indicated  Order: [id number]  (suggestion)  Information displayed in this report will not trend or trigger automated decision support.    Ref Range & Units 2wk ago   Source   Urine, Urine, Clean Catch    Clarity Clear  CloudyAbnormal     Color   Destiny    Comment:    ----REFERENCE VALUE----   Colorless   Yellow   Destiny   Blood Negative  Negative    Nitrite Negative  PositiveAbnormal     Leukocyte Esterase Negative  ModerateAbnormal     Protein  mg/dL 30Abnormal     Comment:    ----REFERENCE VALUE----   Negative   Trace   Glucose Negative mg/dL Negative    Ketone Negative mg/dL Negative    Bilirubin Negative  Negative    pH 5.0 - 8.0  5    Specific Gravity 1.001 - 1.035  1.018    Urobilinogen 0.2 - 1.0 mg/dL 0.2    Specimen Collected: 03/30/21  8:18 AM Last Resulted: 03/30/21  8:24 AM   Received From: [facility]  Result Received: 04/13/21  7:47 AM    Received Information         Impression:    Sjogren's Disease-associated with mild arthritis, mild lymphopenia, and sicca. She is very well today and I appreciate no signs of systemic inflammation or uncontrolled disease. Good tolerance of the hydroxychloroquine and cevimuline and we will continue this regimen.     Vitamin D insufficiency-we will continue the vitamin D and check the level. Normal DEXA in 2015. If her primary does not order a repeat, next visit we will consider this.    Long term management of immunosuppression-with stable lab testing.  Repeat lab testing in 6 months. We also agree that the benefits of continued immunosuppression outweigh the risks of COVID-19 infection.     Plan RTC in 1 year.     A total of 20 minutes was spent in face to face patient interaction and  chart review on the day of service.

## 2021-11-04 DIAGNOSIS — E55.9 VITAMIN D INSUFFICIENCY: ICD-10-CM

## 2021-11-04 DIAGNOSIS — M35.00 SJOGREN'S SYNDROME WITHOUT EXTRAGLANDULAR INVOLVEMENT (H): ICD-10-CM

## 2021-11-04 DIAGNOSIS — Z79.60 LONG-TERM USE OF IMMUNOSUPPRESSANT MEDICATION: ICD-10-CM

## 2021-11-04 NOTE — LETTER
Angella Jasmine  [address] [address]    Dear Angella Jasmine,     Regular eye exams are required while taking hydroxychloroquine (Plaquenil). Eye exams should be completed by an eye specialist who is experienced in monitoring for hydroxychloroquine toxicity (a rare effect of the drug that can damage your eyes and vision).  These may be yearly or as determined by your eye specialist.     Although vision problems and loss of sight while taking hydroxychloroquine are very rare, notify your doctor immediately if you notice changes in your vision. The goal of screening is to detect toxicity before your vision is significantly or noticeably impacted. Failing to get proper screening exams puts you at risk of vision changes which may or may not be reversible.    Per the American Academy of Ophthalmology recommendations (2016), screening tests performed may include a 10-2 visual field test, spectral-domain optical coherence tomography (SD OCT), or other screening tests as determined by the eye specialist, including a multifocal electroretinogram (mfERG) or fundus auto-fluorescence (FAF).    We received a refill request from your pharmacy. A copy of your current eye exam was not found in your medical record. Your hydroxychloroquine prescription has been refilled with a limited supply pending confirmation you have had an eye exam to test for hydroxychloroquine toxicity.      We encourage you to bring this letter to your eye exam to discuss the exams that will be performed during your visit. Please request your eye clinic fax or mail a copy of your eye exam report to our clinic indicating that testing was completed for hydroxychloroquine toxicity screening. The exam notes must specifically comment on the potential for hydroxychloroquine toxicity and outline recommended follow-up.    If you have questions about hydroxychloroquine or the information in this letter, please call the clinic at [phone] or talk to your  provider at your next office visit.                        2    Eye Specialist Letter  Dear Eye Specialist,  To ensure safe use of Plaquenil (hydroxychloroquine), we are requesting your assistance in providing the following information. Please return this form to our clinic via mail or fax, or incorporate this information into your visit summary. Your note must indicate whether or not there is evidence of toxicity from Plaquenil use. For questions regarding this form, please call [phone].  Patient Name:   :             Date of Exam:    The following exams were completed during this visit in accordance with the American Academy of Ophthalmology recommendations (2016):  ? 10-2 automated visual field  ? Spectral-domain optical coherence tomography (SD OCT)  ? Multifocal electroretinogram (mfERG)  ? Fundus autofluorescence (FAF)  ? Other (please specify)  Please select from the following:  ? The findings from the above exams are not suggestive of toxicity from Plaquenil (hydroxychloroquine).  ? The findings from the above exams may suggest toxicity from Plaquenil (hydroxychloroquine).  Directly contact the clinic and fax this form.  Please provide additional guidance on whether or not the medication may be continued from your perspective:  Date of next recommended Plaquenil (hydroxychloroquine) screening eye exam:   ? 5 years  ? 1 year  ? 6 months  Other (please specify):   Eye Specialist Name (print):                                                                Date:  Eye Specialist Signature:

## 2021-11-06 RX ORDER — HYDROXYCHLOROQUINE SULFATE 200 MG/1
200 TABLET, FILM COATED ORAL 2 TIMES DAILY
Qty: 60 TABLET | Refills: 0 | Status: SHIPPED | OUTPATIENT
Start: 2021-11-06 | End: 2021-12-06

## 2021-11-06 NOTE — TELEPHONE ENCOUNTER
hydroxychloroquine (PLAQUENIL) 200 MG tablet   Last Written Prescription Date:  4/13/21  Last Fill Quantity: 180,   # refills: 1  Last Office Visit :4/13/21  Future Office visit: none    Eye exam:  Not found. letter sent    Creatinine   Date Value Ref Range Status   04/09/2019 1.08 (H) 0.52 - 1.04 mg/dL Final         Routing refill request to provider for review/approval because: eye exam not found. Gap in med rf. Thomas SELBY, past due.

## 2021-12-02 DIAGNOSIS — Z79.60 LONG-TERM USE OF IMMUNOSUPPRESSANT MEDICATION: ICD-10-CM

## 2021-12-02 DIAGNOSIS — M35.00 SJOGREN'S SYNDROME WITHOUT EXTRAGLANDULAR INVOLVEMENT (H): ICD-10-CM

## 2021-12-02 DIAGNOSIS — E55.9 VITAMIN D INSUFFICIENCY: ICD-10-CM

## 2021-12-06 NOTE — TELEPHONE ENCOUNTER
HYDROXYCHLOROQUINE 200MG TAB      Last Written Prescription Date:  116-2021  Last Fill Quantity: 60,   # refills: 0  Last Office Visit : 4-  Future Office visit:  12-  Last Eye Exam: 9-3-2019      Routing refill request to provider for review/approval because:  No record of recent eye exam in EPIC - Letter sent 11-6-2021.      Kathleen M Doege RN

## 2021-12-07 ENCOUNTER — TELEPHONE (OUTPATIENT)
Dept: RHEUMATOLOGY | Facility: CLINIC | Age: 76
End: 2021-12-07
Payer: COMMERCIAL

## 2021-12-07 VITALS — BODY MASS INDEX: 62.52 KG/M2 | WEIGHT: 293 LBS

## 2021-12-07 NOTE — TELEPHONE ENCOUNTER
Returned call to pt and told her that Dr Owens said it was ok to change her appointment to a video appointment. Pt isn't signed up for [employer] yet, offered to send her a link to sign up to her email. Pt is agreeable to this so that I can send her information about her appointment using [employer].    Will monitor for the completion of the signing up process.    ERLINDA Pressley, RN  Rheumatology Care Coordinator  [facility]

## 2021-12-07 NOTE — TELEPHONE ENCOUNTER
Orders that are due now faxed to [place] [facility] as requested. Pt notified. Angella is asking if her appointment on 12/14/21 can be changed to a video visit. Will forward message on to Dr Owens as this is an in person only clinic and get back to pt.    JANNETTE PressleyN, RN  Rheumatology Care Coordinator  [facility]

## 2021-12-07 NOTE — TELEPHONE ENCOUNTER
Grey Owens MD  [facility] 2 hours ago (1:33 PM)     DM    I will act on this order once the eye evaluation is sent to us and available to me. Please contact the patient to arrange for this.     Jostin        Call placed to patient regarding eye exam. Spoke with patient who stated that she had this done on 11/26/2021 at [facility]. Patient stated that they were supposed to mail the results. However, these results are available in Care everywhere. Updated chart and routing urgent refill request Dr. Owens as she will be out of med tomorrow.     Plaquenil Eye Exam    Date of last eye exam specifically commenting on HCQ toxicity: 11/26/20021  Clinic: HYDROXYCHLOROQUINE 200MG TAB Phone Number: [phone]  Ophthalmologist: Debby Fan  Toxicity: NO  Records scanned to chart: Available to view in Care Everywhere.      Olivia Booker CMA   12/7/2021 3:53 PM           Olivia Booker CMA   12/7/2021 3:40 PM

## 2021-12-07 NOTE — TELEPHONE ENCOUNTER
----- Message from Abby Michael RN sent at 11/26/2021  8:02 AM CST -----    ----- Message -----  From: Luna Simpson  Sent: 11/18/2021   2:11 PM CST  To: Grey Owens MD, Adult Rheum Triage[facility]    Patient has an upcoming visit with Dr. Owens on 12/14/21.  She would like to get labs done before her appointment.  Please send orders to [facility].

## 2021-12-08 RX ORDER — HYDROXYCHLOROQUINE SULFATE 200 MG/1
200 TABLET, FILM COATED ORAL 2 TIMES DAILY
Qty: 180 TABLET | Refills: 3 | Status: SHIPPED | OUTPATIENT
Start: 2021-12-08 | End: 2021-12-14

## 2021-12-08 NOTE — TELEPHONE ENCOUNTER
Hydroxychloroquine eye screening was completed on 11/26/21/ noted that pt did not have any evidence of Plaquenil toxicities, pt may continue with the hydroxychloroquine.    Medication refilled per standing orders.    JANNETTE PressleyN, RN  Rheumatology Care Coordinator  [facility]

## 2021-12-14 ENCOUNTER — VIRTUAL VISIT (OUTPATIENT)
Dept: RHEUMATOLOGY | Facility: CLINIC | Age: 76
End: 2021-12-14
Attending: INTERNAL MEDICINE
Payer: MEDICARE

## 2021-12-14 DIAGNOSIS — M89.9 BONE/CARTILAGE DISORDER: ICD-10-CM

## 2021-12-14 DIAGNOSIS — Z79.60 LONG-TERM USE OF IMMUNOSUPPRESSANT MEDICATION: ICD-10-CM

## 2021-12-14 DIAGNOSIS — N18.30 STAGE 3 CHRONIC KIDNEY DISEASE, UNSPECIFIED WHETHER STAGE 3A OR 3B CKD (H): ICD-10-CM

## 2021-12-14 DIAGNOSIS — M35.00 SJOGREN'S SYNDROME WITHOUT EXTRAGLANDULAR INVOLVEMENT (H): ICD-10-CM

## 2021-12-14 DIAGNOSIS — M35.00 SJOGREN'S SYNDROME, WITH UNSPECIFIED ORGAN INVOLVEMENT (H): Primary | ICD-10-CM

## 2021-12-14 DIAGNOSIS — M94.9 BONE/CARTILAGE DISORDER: ICD-10-CM

## 2021-12-14 DIAGNOSIS — Z78.0 ASYMPTOMATIC MENOPAUSAL STATE: ICD-10-CM

## 2021-12-14 DIAGNOSIS — E55.9 VITAMIN D INSUFFICIENCY: ICD-10-CM

## 2021-12-14 PROCEDURE — G0463 HOSPITAL OUTPT CLINIC VISIT: HCPCS | Mod: PN,RTG | Performed by: INTERNAL MEDICINE

## 2021-12-14 PROCEDURE — 99214 OFFICE O/P EST MOD 30 MIN: CPT | Mod: 95 | Performed by: INTERNAL MEDICINE

## 2021-12-14 RX ORDER — CEVIMELINE HYDROCHLORIDE 30 MG/1
30 CAPSULE ORAL 3 TIMES DAILY
Qty: 270 CAPSULE | Refills: 3 | Status: SHIPPED | OUTPATIENT
Start: 2021-12-14 | End: 2023-04-18

## 2021-12-14 RX ORDER — ERGOCALCIFEROL 1.25 MG/1
50000 CAPSULE, LIQUID FILLED ORAL
Qty: 6 CAPSULE | Refills: 3 | Status: SHIPPED | OUTPATIENT
Start: 2021-12-14 | End: 2023-03-06

## 2021-12-14 RX ORDER — HYDROXYCHLOROQUINE SULFATE 200 MG/1
200 TABLET, FILM COATED ORAL 2 TIMES DAILY
Qty: 180 TABLET | Refills: 3 | Status: SHIPPED | OUTPATIENT
Start: 2021-12-14 | End: 2023-03-07

## 2021-12-14 ASSESSMENT — PAIN SCALES - GENERAL: PAINLEVEL: NO PAIN (0)

## 2021-12-14 NOTE — LETTER
2021       RE: Angella Jasmine  [address] [facility] [address]     Dear Colleague,    Thank you for referring your patient, Angella Jasmine, to the [facility]HEUMATOLOGY CLINIC [place] at [facility]. Please see a copy of my visit note below.    Angella is a 76 year old who is being evaluated via a billable video visit.      How would you like to obtain your AVS? Mail a copy  If the video visit is dropped, the invitation should be resent by: Send to e-mail at: [email or web address]  Will anyone else be joining your video visit? No      Video Start Time: 1:05 PM       Ms. Jasmine has Sjogren's disease associated with arthritis.  CHIKI+, SSA+, SSB-, dsDNA-, RF+ (previously, now negative), HCV-, Cryo-; lip biopsy positive for SS.    She denies any problems with dysphagia. Cevimeline 30 mg BID with good control of mouth dryness. She will use OTC soothing eye drops. No glandular swelling or soreness. Joints are fine. Energy is pretty. No important AM stiffness.    She is using the cevimeline only once daily in the morning. She thinks it helps, but she is trying to reduce her medication. No dysphagia. Her teeth are stable.  Not too much dry eyes. Energy is stable, but she admits to obesity and deconditioning. No arthralgias.    Hydroxychloroquine 400 mg daily is well tolerated with normal eye check. Vitamin D 50,000 units twice monthly.    PMI:  Medical-HTN, hypothyroidism, sleep apnea on CPAP, obesity, vitamin D deficiency, gout, Sjogren's, sacroiliac joint dysfunction  Surgical-bilateral partial knee arthroplasty  Injuries-none     SH:  Retired. 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 No tobacco or EtOH. Right handed.     FH:  Sister may have SS and RA  Brother with kidney cancer  Cancer in the family  Mother  with cancer, also had RA  Father  with colon cancer  Children are healthy  Daughter with blood  clots, uterine cancer s/p hysterectomy    PMSF history personally obtained and updated by me this visit.    ROS:  +Allergy to pcn, codiene, prednisone, sulfa  Remainder of the 14 point ROS obtained and found negative.    Physical Exam:  Constitutional: WD-WN-WG cooperative  Eyes: nl EOM, sclera  ENT: nl external ears, nose, hearing, lips  Neck: no visible thyroid enlargement  Resp: nl effort  MS:  No active synovitis or deformity.   Skin: no alopecia, rash  Neuro: nl cranial nerves   Psych: nl judgement, affect    Laboratory:    View Detailed Reports   View Condensed Results Report   Contains abnormal data Basic Metabolic Panel  Order: [id number]  (suggestion)  Information displayed in this report will not trend or trigger automated decision support.    Ref Range & Units 2wk ago   Potassium, P 3.6 - 5.2 mmol/L 4.6    Sodium, P 135 - 145 mmol/L 141    Chloride, P 98 - 107 mmol/L 106    Bicarbonate, P 22 - 29 mmol/L 25    Anion Gap, P 7 - 15  10    BUN, P 6 - 21 mg/dL 16    Creatinine, P 0.59 - 1.04 mg/dL 1.1High     eGFR Black >=60 mL/min/BSA 57Low     Comment:    ----ADDITIONAL INFORMATION----   Estimated GFR calculated using the 2009 CKD_EPI creatinine   equation.   eGFR Non-Black >=60 mL/min/BSA 49Low     Comment:    ----ADDITIONAL INFORMATION----   Estimated GFR calculated using the 2009 CKD_EPI creatinine   equation.   Calcium, Total, P 8.8 - 10.2 mg/dL 10    Glucose, P 70 - 140 mg/dL 104    Specimen Collected: 03/30/21  9:14 AM Last Resulted: 03/30/21 10:17 AM   [facility]  Result Received: 04/13/21  7:47 AM    Received Information   Contains abnormal data CBC with Differential, Blood  Order: [id number]  (suggestion)  Information displayed in this report will not trend or trigger automated decision support.    Ref Range & Units 2wk ago   Hemoglobin 11.6 - 15.0 g/dL 13.8    Hematocrit 35.5 - 44.9 % 42.2    Erythrocytes 3.92 - 5.13 x10(12)/L  4.81    MCV 78.2 - 97.9 fL 87.7    RBC Distrib Width 12.2  - 16.1 % 13.8    Platelet Count 157 - 371 x10(9)/L 187    Leukocytes 3.4 - 9.6 x10(9)/L 5.3    Neutrophils 1.56 - 6.45 x10(9)/L 4.17    Lymphocytes 0.95 - 3.07 x10(9)/L 0.52Low     Monocytes 0.26 - 0.81 x10(9)/L 0.44    Eosinophils 0.03 - 0.48 x10(9)/L  0.12    Basophils 0.01 - 0.08 x10(9)/L 0.03    Specimen Collected: 03/30/21  9:14 AM Last Resulted: 03/30/21  9:25 AM   Received From: [facility]  Result Received: 04/13/21  7:47 AM    Received Information   Contains abnormal data Microscopic Automated  Order: [id number]  (suggestion)  Information displayed in this report will not trend or trigger automated decision support.    Ref Range & Units 2wk ago   White Blood Cells  /hpf 51-100Abnormal     Comment:    ----REFERENCE VALUE----   Males: 0-3   Females: 0-10   Unknown: 0-10   Red Blood Cells 0 - 2 /hpf 3-10Abnormal     Dysmorphic Red Blood Cells <=25 % <=25    Hyaline Casts  /lpf None Seen    Squamous Cells  /hpf 11-20    Bacteria None Seen  PresentAbnormal     Specimen Collected: 03/30/21  8:18 AM Last Resulted: 03/30/21  8:38 AM   Received From: [facility]  Result Received: 04/13/21  7:47 AM    Received Information   Contains abnormal data Urinalysis with Microscopic if Indicated  Order: [id number]  (suggestion)  Information displayed in this report will not trend or trigger automated decision support.    Ref Range & Units 2wk ago   Source   Urine, Urine, Clean Catch    Clarity Clear  CloudyAbnormal     Color   Destiny    Comment:    ----REFERENCE VALUE----   Colorless   Yellow   Destiny   Blood Negative  Negative    Nitrite Negative  PositiveAbnormal     Leukocyte Esterase Negative  ModerateAbnormal     Protein  mg/dL 30Abnormal     Comment:    ----REFERENCE VALUE----   Negative   Trace   Glucose Negative mg/dL Negative    Ketone Negative mg/dL Negative    Bilirubin Negative  Negative    pH 5.0 - 8.0  5    Specific Gravity 1.001 - 1.035  1.018    Urobilinogen 0.2 - 1.0 mg/dL 0.2    Specimen Collected: 03/30/21   8:18 AM Last Resulted: 03/30/21  8:24 AM   Received From: [facility]  Result Received: 04/13/21  7:47 AM    Received Information         Impression:    Sjogren's Disease-associated with mild arthritis, mild lymphopenia, and sicca. She continues to tolerate the hydroxychloroquine and cevimuline well and no evidence of disease progression. Based on all of this we will continue the regimen and retest in 6 months.    Vitamin D insufficiency-get DEXA and vitamin D level now    Long term management of immunosuppression-with stable toxicity screening of the eyes. Repeat this in 1 year.  We also agree that the benefits of continued immunosuppression outweigh the risks of COVID-19 infection. She is COVID vaccinated.    Plan follow up in 6 months.    A total of 35 minutes was spent in face to face patient interaction and chart review on the day of service.            Video-Visit Details    Type of service:  Video Visit    Video End Time:1:26 PM    Originating Location (pt. Location): Home    Distant Location (provider location):  [facility] RHEUMATOLOGY CLINIC [place]     Platform used for Video Visit: Cely Owens MD

## 2021-12-14 NOTE — PROGRESS NOTES
Angella is a 76 year old who is being evaluated via a billable video visit.      How would you like to obtain your AVS? Mail a copy  If the video visit is dropped, the invitation should be resent by: Send to e-mail at: [email or web address]  Will anyone else be joining your video visit? No      Video Start Time: 1:05 PM       Ms. Jasmine has Sjogren's disease associated with arthritis.  CHIKI+, SSA+, SSB-, dsDNA-, RF+ (previously, now negative), HCV-, Cryo-; lip biopsy positive for SS.    She denies any problems with dysphagia. Cevimeline 30 mg BID with good control of mouth dryness. She will use OTC soothing eye drops. No glandular swelling or soreness. Joints are fine. Energy is pretty. No important AM stiffness.    She is using the cevimeline only once daily in the morning. She thinks it helps, but she is trying to reduce her medication. No dysphagia. Her teeth are stable.  Not too much dry eyes. Energy is stable, but she admits to obesity and deconditioning. No arthralgias.    Hydroxychloroquine 400 mg daily is well tolerated with normal eye check. Vitamin D 50,000 units twice monthly.    PMI:  Medical-HTN, hypothyroidism, sleep apnea on CPAP, obesity, vitamin D deficiency, gout, Sjogren's, sacroiliac joint dysfunction  Surgical-bilateral partial knee arthroplasty  Injuries-none     SH:  Retired. 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 No tobacco or EtOH. Right handed.     FH:  Sister may have SS and RA  Brother with kidney cancer  Cancer in the family  Mother  with cancer, also had RA  Father  with colon cancer  Children are healthy  Daughter with blood clots, uterine cancer s/p hysterectomy    PMSF history personally obtained and updated by me this visit.    ROS:  +Allergy to pcn, codiene, prednisone, sulfa  Remainder of the 14 point ROS obtained and found negative.    Physical Exam:  Constitutional: WD-WN-WG cooperative  Eyes: nl EOM, sclera  ENT: nl  external ears, nose, hearing, lips  Neck: no visible thyroid enlargement  Resp: nl effort  MS:  No active synovitis or deformity.   Skin: no alopecia, rash  Neuro: nl cranial nerves   Psych: nl judgement, affect    Laboratory:    View Detailed Reports   View Condensed Results Report   Contains abnormal data Basic Metabolic Panel  Order: [id number]  (suggestion)  Information displayed in this report will not trend or trigger automated decision support.    Ref Range & Units 2wk ago   Potassium, P 3.6 - 5.2 mmol/L 4.6    Sodium, P 135 - 145 mmol/L 141    Chloride, P 98 - 107 mmol/L 106    Bicarbonate, P 22 - 29 mmol/L 25    Anion Gap, P 7 - 15  10    BUN, P 6 - 21 mg/dL 16    Creatinine, P 0.59 - 1.04 mg/dL 1.1High     eGFR Black >=60 mL/min/BSA 57Low     Comment:    ----ADDITIONAL INFORMATION----   Estimated GFR calculated using the 2009 CKD_EPI creatinine   equation.   eGFR Non-Black >=60 mL/min/BSA 49Low     Comment:    ----ADDITIONAL INFORMATION----   Estimated GFR calculated using the 2009 CKD_EPI creatinine   equation.   Calcium, Total, P 8.8 - 10.2 mg/dL 10    Glucose, P 70 - 140 mg/dL 104    Specimen Collected: 03/30/21  9:14 AM Last Resulted: 03/30/21 10:17 AM   Received From: [facility]  Result Received: 04/13/21  7:47 AM    Received Information   Contains abnormal data CBC with Differential, Blood  Order: [id number]  (suggestion)  Information displayed in this report will not trend or trigger automated decision support.    Ref Range & Units 2wk ago   Hemoglobin 11.6 - 15.0 g/dL 13.8    Hematocrit 35.5 - 44.9 % 42.2    Erythrocytes 3.92 - 5.13 x10(12)/L  4.81    MCV 78.2 - 97.9 fL 87.7    RBC Distrib Width 12.2 - 16.1 % 13.8    Platelet Count 157 - 371 x10(9)/L 187    Leukocytes 3.4 - 9.6 x10(9)/L 5.3    Neutrophils 1.56 - 6.45 x10(9)/L 4.17    Lymphocytes 0.95 - 3.07 x10(9)/L 0.52Low     Monocytes 0.26 - 0.81 x10(9)/L 0.44    Eosinophils 0.03 - 0.48 x10(9)/L  0.12    Basophils 0.01 - 0.08 x10(9)/L 0.03     Specimen Collected: 03/30/21  9:14 AM Last Resulted: 03/30/21  9:25 AM   Received From: [facility]  Result Received: 04/13/21  7:47 AM    Received Information   Contains abnormal data Microscopic Automated  Order: [id number]  (suggestion)  Information displayed in this report will not trend or trigger automated decision support.    Ref Range & Units 2wk ago   White Blood Cells  /hpf 51-100Abnormal     Comment:    ----REFERENCE VALUE----   Males: 0-3   Females: 0-10   Unknown: 0-10   Red Blood Cells 0 - 2 /hpf 3-10Abnormal     Dysmorphic Red Blood Cells <=25 % <=25    Hyaline Casts  /lpf None Seen    Squamous Cells  /hpf 11-20    Bacteria None Seen  PresentAbnormal     Specimen Collected: 03/30/21  8:18 AM Last Resulted: 03/30/21  8:38 AM   Received From: [facility]  Result Received: 04/13/21  7:47 AM    Received Information   Contains abnormal data Urinalysis with Microscopic if Indicated  Order: [id number]  (suggestion)  Information displayed in this report will not trend or trigger automated decision support.    Ref Range & Units 2wk ago   Source   Urine, Urine, Clean Catch    Clarity Clear  CloudyAbnormal     Color   Destiny    Comment:    ----REFERENCE VALUE----   Colorless   Yellow   Destiny   Blood Negative  Negative    Nitrite Negative  PositiveAbnormal     Leukocyte Esterase Negative  ModerateAbnormal     Protein  mg/dL 30Abnormal     Comment:    ----REFERENCE VALUE----   Negative   Trace   Glucose Negative mg/dL Negative    Ketone Negative mg/dL Negative    Bilirubin Negative  Negative    pH 5.0 - 8.0  5    Specific Gravity 1.001 - 1.035  1.018    Urobilinogen 0.2 - 1.0 mg/dL 0.2    Specimen Collected: 03/30/21  8:18 AM Last Resulted: 03/30/21  8:24 AM   Received From: [facility]  Result Received: 04/13/21  7:47 AM    Received Information         Impression:    Sjogren's Disease-associated with mild arthritis, mild lymphopenia, and sicca. She continues to tolerate the hydroxychloroquine and cevimuline well  and no evidence of disease progression. Based on all of this we will continue the regimen and retest in 6 months.    Vitamin D insufficiency-get DEXA and vitamin D level now    Long term management of immunosuppression-with stable toxicity screening of the eyes. Repeat this in 1 year.  We also agree that the benefits of continued immunosuppression outweigh the risks of COVID-19 infection. She is COVID vaccinated.    Plan follow up in 6 months.    A total of 35 minutes was spent in face to face patient interaction and chart review on the day of service.            Video-Visit Details    Type of service:  Video Visit    Video End Time:1:26 PM    Originating Location (pt. Location): Home    Distant Location (provider location):  [facility] RHEUMATOLOGY CLINIC [place]     Platform used for Video Visit: Cely

## 2021-12-14 NOTE — PATIENT INSTRUCTIONS
Continue your current medications  Get a DEXA scan now and repeat blood testing in about 6 months  Follow up with me in 6 months

## 2021-12-16 ENCOUNTER — TELEPHONE (OUTPATIENT)
Dept: RHEUMATOLOGY | Facility: CLINIC | Age: 76
End: 2021-12-16
Payer: COMMERCIAL

## 2021-12-19 ENCOUNTER — HEALTH MAINTENANCE LETTER (OUTPATIENT)
Age: 76
End: 2021-12-19

## 2021-12-21 ENCOUNTER — TELEPHONE (OUTPATIENT)
Dept: RHEUMATOLOGY | Facility: CLINIC | Age: 76
End: 2021-12-21
Payer: COMMERCIAL

## 2021-12-21 NOTE — TELEPHONE ENCOUNTER
----- Message from Olivia Booker CMA sent at 12/21/2021  7:43 AM CST -----    ----- Message -----  From: Grey Owens MD  Sent: 12/20/2021  11:41 AM CST  To: Adult Rheum Triage-U[facility]    Please follow up with Angella and see if she is having any urine problems. The urine sample was contaminated and may not accurately reflect urine in the bladder.

## 2021-12-21 NOTE — TELEPHONE ENCOUNTER
Called pt per Dr Owens request. Pt has been working with her primary care clinic about the abnormal UA. Angella does report having urine incontinence but this isn't new has been going on for a while. Reviewed office note from 12/8/21 visit and primary care provider was given nystatin powder three times daily, monistat suppositories and cream-twice daily for vaginitis and a rash in her groin, vaginal/vulver area which did include some open areas.    Pt has also been referred to Urology for her incontinence. Pt felt that everything was being handled by her primary care clinic.    JANNETTE PressleyN, RN  Rheumatology Care Coordinator  [facility]

## 2022-10-16 ENCOUNTER — HEALTH MAINTENANCE LETTER (OUTPATIENT)
Age: 77
End: 2022-10-16

## 2023-03-02 DIAGNOSIS — M35.00 SJOGREN'S SYNDROME, WITH UNSPECIFIED ORGAN INVOLVEMENT (H): ICD-10-CM

## 2023-03-02 DIAGNOSIS — Z79.60 LONG-TERM USE OF IMMUNOSUPPRESSANT MEDICATION: ICD-10-CM

## 2023-03-02 DIAGNOSIS — M94.9 BONE/CARTILAGE DISORDER: ICD-10-CM

## 2023-03-02 DIAGNOSIS — E55.9 VITAMIN D INSUFFICIENCY: ICD-10-CM

## 2023-03-02 DIAGNOSIS — M89.9 BONE/CARTILAGE DISORDER: ICD-10-CM

## 2023-03-02 DIAGNOSIS — N18.30 STAGE 3 CHRONIC KIDNEY DISEASE, UNSPECIFIED WHETHER STAGE 3A OR 3B CKD (H): ICD-10-CM

## 2023-03-02 DIAGNOSIS — M35.00 SJOGREN'S SYNDROME WITHOUT EXTRAGLANDULAR INVOLVEMENT (H): ICD-10-CM

## 2023-03-06 NOTE — TELEPHONE ENCOUNTER
Plaquenil   200MG    Last Written Prescription Date:  12/14/21  Last Fill Quantity: 180,   # refills: 3  Last Eye Exam:   11/26/21   MEDIA   RTC 6 MOS   Scheduling has been notified to contact the pt for appointment.  Refill pended and routed to RN POOL  for review/determination due to : Over due eye exam       VIT D2 (ERGOCALCIFEROL) 50,000U     Last Written Prescription Date:  12/14/21  Last Fill Quantity: 6,   # refills: 3  Last Office Visit : 12/14/21  Future Office visit:  NONE  RTC 6 MOS   Routing refill request to provider for review/approval because:   Drug not on the refill protocol    Scheduling has been notified to contact the pt for appointment.

## 2023-03-07 RX ORDER — HYDROXYCHLOROQUINE SULFATE 200 MG/1
TABLET, FILM COATED ORAL
Qty: 180 TABLET | Refills: 0 | Status: SHIPPED | OUTPATIENT
Start: 2023-03-07 | End: 2023-04-18

## 2023-03-07 RX ORDER — ERGOCALCIFEROL 1.25 MG/1
CAPSULE, LIQUID FILLED ORAL
Qty: 6 CAPSULE | Refills: 0 | Status: SHIPPED | OUTPATIENT
Start: 2023-03-07 | End: 2023-04-18

## 2023-03-07 NOTE — TELEPHONE ENCOUNTER
Patient needs laboratory testing completed, hydroxychloroquine eye check completed and on the chart, and follow up appointment scheduled to receive more medication.

## 2023-03-14 ENCOUNTER — TELEPHONE (OUTPATIENT)
Dept: RHEUMATOLOGY | Facility: CLINIC | Age: 78
End: 2023-03-14

## 2023-03-14 NOTE — LETTER
PATIENT DEMOGRAPHICS:   Name: Angella Jasmine MRN: [id number]   Address: [address] [place] [address] Sex: Female   City/St/Zip: [place] [address] : 1945   Home Phone: [phone] Work Phone:     [facility]: [place] Cell Phone: [phone]       EMERGENCY CONTACT:  Contact Name: Guero Jasmine Relationship: Spouse   Home Phone: [phone] Work Phone:         Cell Phone: [phone]      GUARANTOR INFORMATION: Relationship to Patient: Self  Name: Angella Jasmine       Address: [address] [place] [address]  Account Type: Personal/family   City/St[place] [address] Employer: Retired   Home Phone: [phone] Work Phone:          COVERAGE INFORMATION:  Primary Payor: [employer] Plan: [facility]   Subscriber: Angella Jasmine Group #: [id number]   Subscriber Sex: Female       Subscriber : 1945 Patient Rel'ship: Self   Subscriber Effective Date:   Member Effective Date: 2019    Subscriber ID [id number] Member ID: [id number]      Secondary Payor: Medicare Plan: Medicare   Subscriber: Angella Jasmine Group #:     Subscriber Sex: Female       Subscriber : 1945 Patient Rel'ship: Self   Subscriber Effective Date:   Member Effective Date: 2001   Subscriber ID [id number] Member ID: [id number]      PROVIDER INFORMATION:  Referring Physician:   Referring Address:     Referring Phone: N/A Referring Fax:     Primary Physician: Mark Askew Primary Address: [place] [address], [facility]*   Primary Phone: [phone] Primary Fax: [phone]           Order  CBC with Platelets & Differential [[id number]] (Order [id number])  Linked Results    Procedure Abnormality Status   CBC with Platelets & Differential       Patient Name   Angella Jasmine MRN   [id number] Legal Sex   Female    1945     Patient Demographics    Address   [address] [place] [address] Phone   [phone] (Home) *Preferred*   [phone] (Mobile) E-mail Address   [email or web address]     Base CPT Code (Reference  Only)    Code CPT Chargeables   WBY030 [id number]      Existing Charges    Charge Line Charge Code Status Charge Trigger Charge Type   None          Order Information    Order Date/Time Release Date/Time Start Date/Time End Date/Time   03/07/23 09:03 AM None 3/7/2023 None     Order Details    Frequency Duration Priority Order Class   None None Routine Lab Collect     Order Providers    Authorizing Provider Encounter Provider   Grey Owens MD Mueller, Daniel, MD     ABN Associated with this Order    There is no ABN associated with this order.                    Ordering Provider's NPI: [id number]  Grey Owens    CBC with Platelets & Differential [[id number]]    Electronically signed by: Grey Owens MD on 03/07/23 0903 Status: Active   Ordering user: Grey Owens MD 03/07/23 0903     Order History  Outpatient  Date/Time Action Taken User Additional Information   03/07/23 0903 Sign Grey Owens MD      Standing Order Information    Remaining Occurrences Interval Next Expected Expires     2/2 every 6 months N/A 3/7/2024              Reference Links    Lab Guide                    Associated Diagnoses    Sjogren's syndrome, with unspecified organ involvement (H) [M35.00]       Stage 3 chronic kidney disease, unspecified whether stage 3a or 3b CKD (H) [N18.30]       Vitamin D insufficiency [E55.9]       Long-term use of immunosuppressant medication [Z79.60]       Sjogren's syndrome without extraglandular involvement (H) [M35.00]       Bone/cartilage disorder [M89.9, M94.9]         Source Order Set    Order Set Name Order ID    [id number]               Encounter    View Encounter              Order Report    View Order Information         Lab and Collection    CBC with Platelets & Differential (Order: [id number]) - 3/7/2023      External System: External ID:   EXTEAP CBCD   Outbound Lab CBCD   LABDEEAP BLA351   GICHLABEAP 10945.0   GICHLABEAP PZWJ673   ATLASEAP AKO263   HE EAP BQY848   HE2FV_CUPIDF CJQ1891    HE2FV_CUPIDF THS954   VRF_EAP [id number]         Order Requisition    CBC with Platelets & Differential (Order #[id number]) on 3/7/23       Reprint Order Requisition    CBC with Platelets & Differential (Order #[id number]) on 3/7/23       Tracking Links    Cosign Tracking Order Transmittal Tracking       Order  CRP inflammation [[id number]] (Order [id number])  Patient Name   Angella Jasmine MRN   [id number] Legal Sex   Female    1945     Patient Demographics    Address   [address] [place] [address] Phone   [phone] (Home) *Preferred*   [phone] (Mobile) E-mail Address   [email or web address]     Base CPT Code (Reference Only)    Code CPT Chargeables   [id number] POX1567 [id number]     Existing Charges    Charge Line Charge Code Status Charge Trigger Charge Type   None          Order Information    Order Date/Time Release Date/Time Start Date/Time End Date/Time   23 09:03 AM None 3/7/2023 None     Order Details    Frequency Duration Priority Order Class   None None Routine Lab Collect     Order Providers    Authorizing Provider Encounter Provider   Grey Owens MD Mueller, Daniel, MD     ABN Associated with this Order    There is no ABN associated with this order.                    Ordering Provider's NPI: [id number]  Grey Owens    CRP inflammation [[id number]]    Electronically signed by: Grey Owens MD on 23 Status: Active   Ordering user: Grey Owens MD 23     Order History  Outpatient  Date/Time Action Taken User Additional Information   23 Sign Grey Owens MD      Standing Order Information    Remaining Occurrences Interval Next Expected Expires     2/2 every 6 months N/A 3/7/2024              Reference Links    Lab Guide                    Associated Diagnoses    Sjogren's syndrome, with unspecified organ involvement (H) [M35.00]       Stage 3 chronic kidney disease, unspecified whether stage 3a or 3b CKD (H) [N18.30]       Vitamin  D insufficiency [E55.9]       Long-term use of immunosuppressant medication [Z79.60]       Sjogren's syndrome without extraglandular involvement (H) [M35.00]       Bone/cartilage disorder [M89.9, M94.9]         Source Order Set    Order Set Name Order ID    [id number]         Collection Information    Blood    Peripheral Blood        [facility] Agency[facility]               Encounter    View Encounter              Order Report    View Order Information         Lab and Collection    CRP inflammation (Order: [id number]) - 3/7/2023      External System: External ID:   EXTEAP CRPI   Outbound Lab CRPI   LABDEEAP YHM0479   GICHLABEAP 61507.0   HE EAP RVO903   ATLASEAP OKI8923   HE2FV_CUPIDF WEA841   PHLEBIOEAP CRP   VRF_EAP 16592722         Order Requisition    CRP inflammation (Order #[id number]) on 3/7/23       Reprint Order Requisition    CRP inflammation (Order #[id number]) on 3/7/23       Tracking Links    Cosign Tracking Order Transmittal Tracking       Order  Erythrocyte sedimentation rate auto [QAM714] (Order [id number])  Patient Name   Angella Jasmine MRN   [id number] Legal Sex   Female    1945     Patient Demographics    Address   [address] [place] [address] Phone   [phone] (Home) *Preferred*   [phone] (Mobile) E-mail Address   [email or web address]     Base CPT Code (Reference Only)    Code CPT Chargeables   YTV554 XMO067 6198962     Existing Charges    Charge Line Charge Code Status Charge Trigger Charge Type   None          Order Information    Order Date/Time Release Date/Time Start Date/Time End Date/Time   23 09:03 AM None 3/7/2023 None     Order Details    Frequency Duration Priority Order Class   None None Routine Lab Collect     Order Providers    Authorizing Provider Encounter Provider   Grey Owens MD Mueller, Daniel, MD     ABN Associated with this Order    There is no ABN associated with this order.                    Ordering Provider's NPI: [id number]  Grey PRICE  Graciela    Erythrocyte sedimentation rate auto [[id number]]    Electronically signed by: Grey Owens MD on 03/07/23 0903 Status: Active   Ordering user: Grey Owens MD 03/07/23 0903     Order History  Outpatient  Date/Time Action Taken User Additional Information   03/07/23 0903 Sign Grey Owens MD      Standing Order Information    Remaining Occurrences Interval Next Expected Expires     2/2 every 6 months N/A 3/7/2024              Reference Links    Lab Guide                    Associated Diagnoses    Sjogren's syndrome, with unspecified organ involvement (H) [M35.00]       Stage 3 chronic kidney disease, unspecified whether stage 3a or 3b CKD (H) [N18.30]       Vitamin D insufficiency [E55.9]       Long-term use of immunosuppressant medication [Z79.60]       Sjogren's syndrome without extraglandular involvement (H) [M35.00]       Bone/cartilage disorder [M89.9, M94.9]         Source Order Set    Order Set Name Order ID    [id number]         Collection Information    Blood    Peripheral Blood        Resulting Agency: [facility]               Encounter    View Encounter              Order Report    View Order Information         Lab and Collection    Erythrocyte sedimentation rate auto (Order: [id number]) - 3/7/2023      External System: External ID:   EXTEAP WESR   Outbound Lab WESR   GICHLABEAP KJP93469   LABDEEAP PKE523   HE EAP HCB480   ATLASEAP QDB117   HE2FV_CUPIDF QDT702   PHLEBIOEAP ESR   VRF_EAP Sedimentation Rate (ESR)   VRF_EAP Sed Rate (ESR) Transcribed Results   VRF_EAP 8033799   VRF_EAP 64365591   VRF_EAP 9599023   VRF_EAP DCP GENERIC CODE - Sed Rate (ESR) Transcribed Resu         Order Requisition    Erythrocyte sedimentation rate auto (Order #[id number]) on 3/7/23       Reprint Order Requisition    Erythrocyte sedimentation rate auto (Order #[id number]) on 3/7/23       Tracking Links    Cosign Tracking Order Transmittal Tracking       Order  Albumin level [LAB45] (Order [id number])  Patient  Name   Angella Jasmine MRN   [id number] Legal Sex   Female    1945     Patient Demographics    Address   [address] [place] [address] Phone   [phone] (Home) *Preferred*   [phone] (Mobile) E-mail Address   [email or web address]     Base CPT Code (Reference Only)    Code CPT Chargeables   LAB45 LAB45 1115998     Existing Charges    Charge Line Charge Code Status Charge Trigger Charge Type   None          Order Information    Order Date/Time Release Date/Time Start Date/Time End Date/Time   23 09:03 AM None 3/7/2023 None     Order Details    Frequency Duration Priority Order Class   None None Routine Lab Collect     Order Providers    Authorizing Provider Encounter Provider   Grey Owens MD Mueller, Daniel, MD     ABN Associated with this Order    There is no ABN associated with this order.                    Ordering Provider's NPI: [id number]  Grey Owens    Albumin level [[id number]]    Electronically signed by: Grey Owens MD on 23 Status: Active   Ordering user: Grey Owens MD 23     Order History  Outpatient  Date/Time Action Taken User Additional Information   23 Sign Grey Owens MD      Standing Order Information    Remaining Occurrences Interval Next Expected Expires     2/2 every 6 months N/A 3/7/2024              Reference Links    Lab Guide                    Associated Diagnoses    Sjogren's syndrome, with unspecified organ involvement (H) [M35.00]       Stage 3 chronic kidney disease, unspecified whether stage 3a or 3b CKD (H) [N18.30]       Vitamin D insufficiency [E55.9]       Long-term use of immunosuppressant medication [Z79.60]       Sjogren's syndrome without extraglandular involvement (H) [M35.00]       Bone/cartilage disorder [M89.9, M94.9]         Source Order Set    Order Set Name Order ID    [id number]         Collection Information    Blood    Peripheral Blood        Resulting Agency: [facility]                Encounter    View Encounter              Order Report    View Order Information         Lab and Collection    Albumin level (Order: [id number]) - 3/7/2023      External System: External ID:   EXTEAP ALB   Outbound Lab ALB   LABDEEAP LAB45   GICHLABEAP 44873.0   HE EAP LAB45   ATLASEAP LAB45   HE2FV_CUPIDF LAB45   PHLEBIOEAP ALB   VRF_EAP 5827155   VRF_EAP 10934358         Order Requisition    Albumin level (Order #[id number]) on 3/7/23       Reprint Order Requisition    Albumin level (Order #[id number]) on 3/7/23       Tracking Links    Cosign Tracking Order Transmittal Tracking       Order  ALT [[id number]] (Order [id number])  Patient Name   Angella Jasmine MRN   [id number] Legal Sex   Female    1945     Patient Demographics    Address   [address] [place] [address] Phone   [phone] (Home) *Preferred*   [phone] (Mobile) E-mail Address   [email or web address]     Base CPT Code (Reference Only)    Code CPT Chargeables   TIA741 QCE093 7360636     Existing Charges    Charge Line Charge Code Status Charge Trigger Charge Type   None          Order Information    Order Date/Time Release Date/Time Start Date/Time End Date/Time   23 09:03 AM None 3/7/2023 None     Order Details    Frequency Duration Priority Order Class   None None Routine Lab Collect     Order Providers    Authorizing Provider Encounter Provider   Grey Owens MD Mueller, Daniel, MD     ABN Associated with this Order    There is no ABN associated with this order.                    Ordering Provider's NPI: [id number]  Grey Owens    ALT [[id number]]    Electronically signed by: Grey Owens MD on 23 Status: Active   Ordering user: Grey Owens MD 23     Order History  Outpatient  Date/Time Action Taken User Additional Information   23 Sign Grey Owens MD      Standing Order Information    Remaining Occurrences Interval Next Expected Expires     2/2 every 6 months N/A  3/7/2024              Reference Links    Lab Guide                    Associated Diagnoses    Sjogren's syndrome, with unspecified organ involvement (H) [M35.00]       Stage 3 chronic kidney disease, unspecified whether stage 3a or 3b CKD (H) [N18.30]       Vitamin D insufficiency [E55.9]       Long-term use of immunosuppressant medication [Z79.60]       Sjogren's syndrome without extraglandular involvement (H) [M35.00]       Bone/cartilage disorder [M89.9, M94.9]         Source Order Set    Order Set Name Order ID    [id number]         Collection Information    Blood    Peripheral Blood        Resulting Agency: [facility]               Encounter    View Encounter              Order Report    View Order Information         Lab and Collection    ALT (Order: [id number]) - 3/7/2023      External System: External ID:   EXTEAP ALT   Outbound Lab ALT   LABDEEAP EWJ597   GICHLABEAP 66275.0   HE EAP UOJ819   ATLASEAP JTJ273   HE2FV_CUPIDF LPY608   PHLEBIOEAP ALT   VRF_EAP 0611349   VRF_EAP 3656763995   VRF_EAP 0033239   VRF_EAP 5257198   VRF_EAP 50970153         Order Requisition    ALT (Order #[id number]) on 3/7/23       Reprint Order Requisition    ALT (Order #[id number]) on 3/7/23       Tracking Links    Cosign Tracking Order Transmittal Tracking       Order  AST [[id number]] (Order [id number])  Patient Name   Angella Jasmine MRN   [id number] Legal Sex   Female    1945     Patient Demographics    Address   [address] [place] [address] Phone   [phone] (Home) *Preferred*   [phone] (Mobile) E-mail Address   [email or web address]     Base CPT Code (Reference Only)    Code CPT Chargeables   BDB260 AMR749 7241987     Existing Charges    Charge Line Charge Code Status Charge Trigger Charge Type   None          Order Information    Order Date/Time Release Date/Time Start Date/Time End Date/Time   23 09:03 AM None 3/7/2023 None     Order Details    Frequency Duration Priority Order Class   None None  Routine Lab Collect     Order Providers    Authorizing Provider Encounter Provider   Grey Owens MD Mueller, Daniel, MD     ABN Associated with this Order    There is no ABN associated with this order.                    Ordering Provider's NPI: [id number]  Grey GEORGESAdeliat Owens    AST [[id number]]    Electronically signed by: Grey Owens MD on 03/07/23 0903 Status: Active   Ordering user: Grey Owens MD 03/07/23 0903     Order History  Outpatient  Date/Time Action Taken User Additional Information   03/07/23 0903 Sign Grey Owens MD      Standing Order Information    Remaining Occurrences Interval Next Expected Expires     2/2 every 6 months N/A 3/7/2024              Reference Links    Lab Guide                    Associated Diagnoses    Sjogren's syndrome, with unspecified organ involvement (H) [M35.00]       Stage 3 chronic kidney disease, unspecified whether stage 3a or 3b CKD (H) [N18.30]       Vitamin D insufficiency [E55.9]       Long-term use of immunosuppressant medication [Z79.60]       Sjogren's syndrome without extraglandular involvement (H) [M35.00]       Bone/cartilage disorder [M89.9, M94.9]         Source Order Set    Order Set Name Order ID    [id number]         Collection Information    Blood    Peripheral Blood        Resulting Agency: [facility]               Encounter    View Encounter              Order Report    View Order Information         Lab and Collection    AST (Order: [id number]) - 3/7/2023      External System: External ID:   EXTEAP AST   Outbound Lab AST   LABDEEAP NPU914   GICHLABEAP 19568.0   HE EAP CMC567   ATLASEAP CSX429   HE2FV_CUPIDF ALU893   PHLEBIOEAP AST   VRF_EAP 6554987397   VRF_EAP 2564289   VRF_EAP 7381702   VRF_EAP 69007461         Order Requisition    AST (Order #[id number]) on 3/7/23       Reprint Order Requisition    AST (Order #[id number]) on 3/7/23       Tracking Links    Cosign Tracking Order Transmittal Tracking       Order  Creatinine [LAB66] (Order  [id number])  Patient Name   Angella Jasmine MRN   [id number] Legal Sex   Female    1945     Patient Demographics    Address   [address] [place] [address] Phone   [phone] (Home) *Preferred*   [phone] (Mobile) E-mail Address   [email or web address]     Base CPT Code (Reference Only)    Code CPT Chargeables   LAB66 LAB66 5789123     Existing Charges    Charge Line Charge Code Status Charge Trigger Charge Type   None          Order Information    Order Date/Time Release Date/Time Start Date/Time End Date/Time   23 09:03 AM None 3/7/2023 None     Order Details    Frequency Duration Priority Order Class   None None Routine Lab Collect     Order Providers    Authorizing Provider Encounter Provider   Grey Owens MD Mueller, Daniel, MD     ABN Associated with this Order    There is no ABN associated with this order.                    Ordering Provider's NPI: [id number]  Grey Owens    Creatinine [[id number]]    Electronically signed by: Grey Owens MD on 23 Status: Active   Ordering user: Grey Owens MD 23     Order History  Outpatient  Date/Time Action Taken User Additional Information   23 Sign Grey Owens MD      Standing Order Information    Remaining Occurrences Interval Next Expected Expires     2/2 every 6 months N/A 3/7/2024              Reference Links    Lab Guide                    Associated Diagnoses    Sjogren's syndrome, with unspecified organ involvement (H) [M35.00]       Stage 3 chronic kidney disease, unspecified whether stage 3a or 3b CKD (H) [N18.30]       Vitamin D insufficiency [E55.9]       Long-term use of immunosuppressant medication [Z79.60]       Sjogren's syndrome without extraglandular involvement (H) [M35.00]       Bone/cartilage disorder [M89.9, M94.9]         Source Order Set    Order Set Name Order ID    [id number]         Collection Information    Blood    Peripheral Blood        Resulting Agency:  [facility]               Encounter    View Encounter              Order Report    View Order Information         Lab and Collection    Creatinine (Order: [id number]) - 3/7/2023      External System: External ID:   EXTEAP CREA   Outbound Lab CREA   LABDEEAP LAB66   GICHLABEAP 07400.2   HE EAP VYP741   HE EAP LAB66   ATLASEAP LAB66   HE2FV_CUPIDF FSZ198   HE2FV_CUPIDF LAB66   PHLEBIOEAP CREA   VRF_EAP DCP GENERIC CODE - Creatinine (Cr) POC   VRF_EAP 9433839   VRF_EAP 9055763   VRF_EAP 9911842   VRF_EAP 38550511         Order Requisition    Creatinine (Order #[id number]) on 3/7/23       Reprint Order Requisition    Creatinine (Order #[id number]) on 3/7/23       Tracking Links    Cosign Tracking Order Transmittal Tracking       Order  CRP inflammation [RDG9301] (Order [id number])  Patient Name   Angella Jasmine MRN   [id number] Legal Sex   Female    1945     Patient Demographics    Address   [address] [place] [address] Phone   [phone] (Home) *Preferred*   [phone] (Mobile) E-mail Address   [email or web address]     Base CPT Code (Reference Only)    Code CPT Chargeables   FNC4609 GPD6877 0049261     Existing Charges    Charge Line Charge Code Status Charge Trigger Charge Type   None          Order Information    Order Date/Time Release Date/Time Start Date/Time End Date/Time   23 09:03 AM None 3/7/2023 None     Order Details    Frequency Duration Priority Order Class   None None Routine Lab Collect     Order Providers    Authorizing Provider Encounter Provider   Grey Owens MD Mueller, Daniel, MD     ABN Associated with this Order    There is no ABN associated with this order.                    Ordering Provider's NPI: [id number]  Grey Owens    CRP inflammation [[id number]]    Electronically signed by: Grey Owens MD on 23 Status: Active   Ordering user: Grey Owens MD 23     Order History  Outpatient  Date/Time Action Taken User Additional  Information   23 0903 Grey Nelson MD      Standing Order Information    Remaining Occurrences Interval Next Expected Expires     2/2 every 6 months N/A 3/7/2024              Reference Links    Lab Guide                    Associated Diagnoses    Sjogren's syndrome, with unspecified organ involvement (H) [M35.00]       Stage 3 chronic kidney disease, unspecified whether stage 3a or 3b CKD (H) [N18.30]       Vitamin D insufficiency [E55.9]       Long-term use of immunosuppressant medication [Z79.60]       Sjogren's syndrome without extraglandular involvement (H) [M35.00]       Bone/cartilage disorder [M89.9, M94.9]         Source Order Set    Order Set Name Order ID    [id number]         Collection Information    Blood    Peripheral Blood        Resulting Agency: [employer]               Encounter    View Encounter              Order Report    View Order Information         Lab and Collection    CRP inflammation (Order: [id number]) - 3/7/2023      External System: External ID:   EXTEAP CRPI   Outbound Lab CRPI   LABDEEAP NEG9245   GICHLABEAP 24200.0   HE EAP THV629   ATLASEAP OPK0567   HE2FV_CUPIDF IWV418   PHLEBIOEAP CRP   VRF_EAP 64940175         Order Requisition    CRP inflammation (Order #[id number]) on 3/7/23       Reprint Order Requisition    CRP inflammation (Order #[id number]) on 3/7/23       Tracking Links    Cosign Tracking Order Transmittal Tracking       Order  Protein  random urine [RBD380] (Order [id number])  Patient Name   Angella Jasmine MRN   [id number] Legal Sex   Female    1945     Patient Demographics    Address   [address] [place] [address] Phone   [phone] (Home) *Preferred*   [phone] (Mobile) E-mail Address   [email or web address]     Base CPT Code (Reference Only)    Code CPT Chargeables   CZY016 CDT222 5352718     Existing Charges    Charge Line Charge Code Status Charge Trigger Charge Type   None          Order Information    Order Date/Time Release  Date/Time Start Date/Time End Date/Time   03/07/23 09:03 AM None 3/7/2023 None     Order Details    Frequency Duration Priority Order Class   None None Routine Clinic Collect     Order Providers    Authorizing Provider Encounter Provider   Grey Owens MD Mueller, Daniel, MD     ABN Associated with this Order    There is no ABN associated with this order.                    Ordering Provider's NPI: [id number]  Grey Owens    Protein  random urine [[id number]]    Electronically signed by: Grey Owens MD on 03/07/23 0903 Status: Active   Ordering user: Grey Owens MD 03/07/23 0903     Order History  Outpatient  Date/Time Action Taken User Additional Information   03/07/23 0903 Sign Grey Owens MD      Standing Order Information    Remaining Occurrences Interval Next Expected Expires     2/2 every 6 months N/A 3/7/2024              Order Questions    Question Answer   Includes with Creat Ratio              Reference Links    Lab Guide                    Associated Diagnoses    Sjogren's syndrome, with unspecified organ involvement (H) [M35.00]       Stage 3 chronic kidney disease, unspecified whether stage 3a or 3b CKD (H) [N18.30]       Vitamin D insufficiency [E55.9]       Long-term use of immunosuppressant medication [Z79.60]       Sjogren's syndrome without extraglandular involvement (H) [M35.00]       Bone/cartilage disorder [M89.9, M94.9]         Source Order Set    Order Set Name Order ID    [id number]         Collection Information    Urine        Resulting Agency: [facility]               Encounter    View Encounter              Order Report    View Order Information         Lab and Collection    Protein  random urine (Order: [id number]) - 3/7/2023      External System: External ID:   EXTEAP UTPR   Outbound Lab UTPR   LABDEEAP GWT950   HE EAP HEX069   HE EAP QMC287   ATLASEAP PTM805   HE2FV_CUPIDF IDJ448   HE2FV_CUPIDF VCS925   PHLEBIOEAP GHUTPR   VRF_EAP [id number]   VRF_EAP [id number]          Order Requisition    Protein  random urine (Order #[id number]) on 3/7/23       Reprint Order Requisition    Protein  random urine (Order #[id number]) on 3/7/23       Tracking Links    Cosign Tracking Order Transmittal Tracking       Order  25 Hydroxyvitamin D2 and D3 [GQV223] (Order [id number])  Patient Name   Angella Jasmine MRN   [id number] Legal Sex   Female    1945     Patient Demographics    Address   [address] [place] [id number] Phone   [phone] (Home) *Preferred*   [phone] (Mobile) E-mail Address   [email or web address]     Base CPT Code (Reference Only)    Code CPT Chargeables   PQI850 [id number]     Existing Charges    Charge Line Charge Code Status Charge Trigger Charge Type   None          Order Information    Order Date/Time Release Date/Time Start Date/Time End Date/Time   23 09:03 AM None 3/7/2023 None     Order Details    Frequency Duration Priority Order Class   None None Routine Lab Collect     Order Providers    Authorizing Provider Encounter Provider   Grey Owens MD Mueller, Daniel, MD     ABN Associated with this Order    There is no ABN associated with this order.                    Ordering Provider's NPI: [id number]  Grey Owens    25 Hydroxyvitamin D2 and D3 [[id number]]    Electronically signed by: Grey Owens MD on 23 Status: Active   Ordering user: Grey Owens MD 23     Order History  Outpatient  Date/Time Action Taken User Additional Information   23 Sign Grey Owens MD      Standing Order Information    Remaining Occurrences Interval Next Expected Expires     2/2 every 6 months N/A 3/7/2024              Reference Links    Lab Guide                    Associated Diagnoses    Sjogren's syndrome, with unspecified organ involvement (H) [M35.00]       Stage 3 chronic kidney disease, unspecified whether stage 3a or 3b CKD (H) [N18.30]       Vitamin D insufficiency [E55.9]       Long-term use of  immunosuppressant medication [Z79.60]       Sjogren's syndrome without extraglandular involvement (H) [M35.00]       Bone/cartilage disorder [M89.9, M94.9]         Source Order Set    Order Set Name Order ID    [id number]         Collection Information    Blood    Peripheral Blood        Resulting Agency: [facility]               Encounter    View Encounter              Order Report    View Order Information         Lab and Collection    25 Hydroxyvitamin D2 and D3 (Order: [id number]) - 3/7/2023      External System: External ID:   EXTEAP VITD23   Outbound Lab VITD23   ATLASEAP FVM316   PHLEBIOEAP VITD23   VRF_EAP [id number]         Order Requisition    25 Hydroxyvitamin D2 and D3 (Order #[id number]) on 3/7/23       Reprint Order Requisition    25 Hydroxyvitamin D2 and D3 (Order #[id number]) on 3/7/23       Tracking Links    Cosign Tracking Order Transmittal Tracking       Our patient has requested to have lab/imaging tests at your facility. Please fax the results to [phone]. If Imaging was completed, please push images to PACS if able.      For Critical Results: Between 8AM and 4:30PM, Monday through Friday, please contact Rheumatology nursing staff at [phone].     For Critical Results: Before 8AM, after 4:30PM, and on weekends, please call [phone] and have the Rheumatologist on-call paged. Please identify if the results are for Rheumatology.     If you have any questions, please call the clinic at [phone]        Sincerely,    Grey Owens MD  Division of Rheumatic and Autoimmune Diseases  [address] [place] [id number]

## 2023-03-14 NOTE — TELEPHONE ENCOUNTER
Pt was contacted by scheduling after refill request was received for Hydroxychloroquine (this medication has already been refilled, pt was contacted just about scheduling follow-up appts).     -Pt now has future appt scheduled with Dr. Owens 4/18/23.   -Pt aware she is due for lab tests, would like to have these orders faxed to the [facility] in [place] (pt did not have the fax number, but says we have sent her orders there before). Pt would like to be notified once orders have been sent so she can schedule her lab appt.   -Pt reports that her last eye exam was in December 2021. She will call to schedule next eye exam when she can, will ask them to send her results to our office.     Please call pt on home phone, but do NOT leave detailed information on voicemail (pt not sure how to check it).     Routed to [facility] RHEUMATOLOGY ADULT [facility]

## 2023-03-20 NOTE — TELEPHONE ENCOUNTER
Lab orders have been successfully faxed to [facility] as requested. Confirmed via [employer].     Olivia Booker CMA   3/20/2023 10:28 AM

## 2023-03-26 ENCOUNTER — HEALTH MAINTENANCE LETTER (OUTPATIENT)
Age: 78
End: 2023-03-26

## 2023-04-17 ENCOUNTER — TELEPHONE (OUTPATIENT)
Dept: RHEUMATOLOGY | Facility: CLINIC | Age: 78
End: 2023-04-17
Payer: COMMERCIAL

## 2023-04-17 NOTE — TELEPHONE ENCOUNTER
Patient informed that Dr. Owens is okay with doing visit as a telephone visit. Patient has been informed.      HERBERT Oneil  Rheumatology/Infectious disease  [facility]   Rheumatology ph:[phone]  Infectious Disease ph:[phone]

## 2023-04-17 NOTE — TELEPHONE ENCOUNTER
[facility] Call Center    Phone Message    May a detailed message be left on voicemail: yes     Reason for Call: Appointment Intake        Pt schedule for a VV tomorrow with Dr. Owens. Would like to know if able to do telephone visit instead. Pt is not able to access computer. Please reach out to pt.      Action Taken: Other: [facility]    Travel Screening: Not Applicable

## 2023-04-18 ENCOUNTER — VIRTUAL VISIT (OUTPATIENT)
Dept: RHEUMATOLOGY | Facility: CLINIC | Age: 78
End: 2023-04-18
Attending: INTERNAL MEDICINE
Payer: MEDICARE

## 2023-04-18 DIAGNOSIS — M89.9 BONE/CARTILAGE DISORDER: ICD-10-CM

## 2023-04-18 DIAGNOSIS — M35.00 SJOGREN'S SYNDROME, WITH UNSPECIFIED ORGAN INVOLVEMENT (H): ICD-10-CM

## 2023-04-18 DIAGNOSIS — N18.30 STAGE 3 CHRONIC KIDNEY DISEASE, UNSPECIFIED WHETHER STAGE 3A OR 3B CKD (H): ICD-10-CM

## 2023-04-18 DIAGNOSIS — Z78.0 ASYMPTOMATIC MENOPAUSAL STATE: ICD-10-CM

## 2023-04-18 DIAGNOSIS — Z79.60 LONG-TERM USE OF IMMUNOSUPPRESSANT MEDICATION: ICD-10-CM

## 2023-04-18 DIAGNOSIS — M35.00 SJOGREN'S SYNDROME WITHOUT EXTRAGLANDULAR INVOLVEMENT (H): ICD-10-CM

## 2023-04-18 DIAGNOSIS — E55.9 VITAMIN D INSUFFICIENCY: ICD-10-CM

## 2023-04-18 DIAGNOSIS — M94.9 BONE/CARTILAGE DISORDER: ICD-10-CM

## 2023-04-18 PROCEDURE — 99443 PR PHYSICIAN TELEPHONE EVALUATION 21-30 MIN: CPT | Mod: 95 | Performed by: INTERNAL MEDICINE

## 2023-04-18 RX ORDER — ERGOCALCIFEROL 1.25 MG/1
CAPSULE, LIQUID FILLED ORAL
Qty: 6 CAPSULE | Refills: 3 | Status: SHIPPED | OUTPATIENT
Start: 2023-04-18 | End: 2023-10-17

## 2023-04-18 RX ORDER — HYDROXYCHLOROQUINE SULFATE 200 MG/1
200 TABLET, FILM COATED ORAL 2 TIMES DAILY
Qty: 180 TABLET | Refills: 3 | Status: SHIPPED | OUTPATIENT
Start: 2023-04-18 | End: 2023-10-17

## 2023-04-18 RX ORDER — CEVIMELINE HYDROCHLORIDE 30 MG/1
30 CAPSULE ORAL 2 TIMES DAILY PRN
Qty: 180 CAPSULE | Refills: 1 | Status: SHIPPED | OUTPATIENT
Start: 2023-04-18 | End: 2024-04-16

## 2023-04-18 NOTE — NURSING NOTE
PT states using Refresh eye drops, CPAP has been discontinued.    Is the patient currently in the state of [place]? YES    Visit mode:VIDEO    If the visit is dropped, the patient can be reconnected by: VIDEO VISIT: Text to cell phone: [phone]    Will anyone else be joining the visit? NO      How would you like to obtain your AVS? MyChart    Are changes needed to the allergy or medication list? NO    Reason for visit: Telephone (yaniraogr)

## 2023-04-18 NOTE — LETTER
2023       RE: Angella Jasmine  [address] [facility] [address]     Dear Colleague,    Thank you for referring your patient, Angella Jasmine, to the [facility] RHEUMATOLOGY CLINIC [place] at [facility]. Please see a copy of my visit note below.    Ms. Jasmine has Sjogren's disease associated with arthritis.  CHIKI+, SSA+, SSB-, dsDNA-, RF+ (previously, now negative), HCV-, Cryo-; lip biopsy positive for SS.    She feels she is doing very well. The cevimeline is effective when used only in the morning. She feels her dryness is very stable, including her eye dryness. She uses multiple eye drops. Her teeth are healthy. She is careful with eating to prevent dysphagia. No swelling or pain around the oropharynx. She has occasional hand pains but no analgesic use.     Hydroxychloroquine 400 mg daily is well tolerated with eye evaluation normal in 2023. Vitamin D 50,000 units twice monthly, with normal vitamin D level in .    PMI:  Medical-HTN, hypothyroidism, sleep apnea on CPAP, obesity, vitamin D deficiency, gout, Sjogren's, sacroiliac joint dysfunction  Surgical-bilateral partial knee arthroplasty  Injuries-none     SH:  Retired. 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 No tobacco or EtOH. Right handed.     FH:  Sister may have SS and RA  Brother with kidney cancer  Cancer in the family  Mother  with cancer, also had RA  Father  with colon cancer  Children are healthy  Daughter with blood clots, uterine cancer s/p hysterectomy    PMSF history personally obtained and updated by me this visit.    ROS:  +irritable bowel symptoms treated with fiber  +edema in the feet  +weight gain  +worse sleep off of CPAP  +Allergy to pcn, codiene, prednisone, sulfa  Remainder of the 14 point ROS obtained and found negative.    Laboratory:      important suggestion  View Detailed Reports      important  suggestion  View Condensed Results Report     Electrophoresis, Protein, Random, Urine  Order: [id number]   suggestion  Information displayed in this report may not trend or trigger automated decision support.      Ref Range & Units 6 d ago   Protein, Total, Random, U mg/dL 18    Albumin, mg/dL mg/dL 18.0    Impression  Albumin is the only protein detected.       Reviewed and interpreted by:  Anna Bergeron M.D.    Comment:    ----ADDITIONAL INFORMATION----   The total protein concentration reported here for a random urine sample is   not normalized by creatinine. For protein/creatinine ratio concentration in   random urine samples, please order RPTU / Protein/Creatinine Ratio, Random, U.   Specimen Collected: 04/12/23 10:00 AM Last Resulted: 04/13/23  5:01 PM   Received From: [facility]  Result Received: 04/18/23  8:00 AM    View Encounter     Received Information   Contains abnormal data CBC with Differential, Blood  Order: [id number]   suggestion  Information displayed in this report may not trend or trigger automated decision support.      Ref Range & Units 6 d ago   Hemoglobin 11.6 - 15.0 g/dL 13.9    Hematocrit 35.5 - 44.9 % 42.4    Erythrocytes 3.92 - 5.13 x10(12)/L 4.76    MCV 78.2 - 97.9 fL 89.1    RBC Distrib Width 12.2 - 16.1 % 13.6    Platelet Count 157 - 371 x10(9)/L 157    Leukocytes 3.4 - 9.6 x10(9)/L 3.5    Neutrophils 1.56 - 6.45 x10(9)/L 2.42    Lymphocytes 0.95 - 3.07 x10(9)/L 0.48 Low     Monocytes 0.26 - 0.81 x10(9)/L 0.41    Eosinophils 0.03 - 0.48 x10(9)/L 0.18    Basophils 0.01 - 0.08 x10(9)/L <0.03    Specimen Collected: 04/12/23  9:50 AM Last Resulted: 04/12/23 10:06 AM   Received From: [facility]  Result Received: 04/18/23  8:00 AM    View Encounter     Received Information  CRP (C-Reactive Protein)  Order: [id number]   suggestion  Information displayed in this report may not trend or trigger automated decision support.      Ref Range & Units 6 d ago   C-Reactive Protein (CRP), P <5.0 mg/L  4.7    Specimen Collected: 04/12/23  9:50 AM Last Resulted: 04/12/23 10:32 AM   Received From: [facility]  Result Received: 04/18/23  8:00 AM    View Encounter     Received Information  Sedimentation Rate  Order: [id number]   suggestion  Information displayed in this report may not trend or trigger automated decision support.      Ref Range & Units 6 d ago   Sedimentation Rate, B 0 - 29 mm/1 h 12    Specimen Collected: 04/12/23  9:50 AM Last Resulted: 04/12/23 10:23 AM   Received From: [facility]  Result Received: 04/18/23  8:00 AM    View Encounter     Received Information  Albumin  Order: [id number]   suggestion  Information displayed in this report may not trend or trigger automated decision support.      Ref Range & Units 6 d ago   Albumin, P 3.5 - 5.0 g/dL 3.8    Specimen Collected: 04/12/23  9:50 AM Last Resulted: 04/12/23 10:32 AM   Received From: [facility]  Result Received: 04/18/23  8:00 AM    View Encounter     Received Information  ALT (Alanine Aminotransferase)  Order: [id number]   suggestion  Information displayed in this report may not trend or trigger automated decision support.      Ref Range & Units 6 d ago   Alanine Aminotransferase (ALT), P 7 - 45 U/L 20    Specimen Collected: 04/12/23  9:50 AM Last Resulted: 04/12/23 10:32 AM   Received From: [facility]  Result Received: 04/18/23  8:00 AM    View Encounter     Received Information  AST (Aspartate Aminotransferase)  Order: [id number]   suggestion  Information displayed in this report may not trend or trigger automated decision support.      Ref Range & Units 6 d ago   Aspartate Aminotransferase (AST), P 8 - 43 U/L 22    Specimen Collected: 04/12/23  9:50 AM Last Resulted: 04/12/23 10:32 AM   Received From: [facility]  Result Received: 04/18/23  8:00 AM    View Encounter     Received Information  Creatinine with Estimated GFR  Order: [id number]   suggestion  Information displayed in this report may not trend or trigger automated decision support.       Ref Range & Units 6 d ago   Creatinine 0.59 - 1.04 mg/dL 0.89    Estimated GFR (eGFR) >=60 mL/min/BSA 67    Comment: Estimated GFR calculated using the 2021   CKD_EPI creatinine equation.   Specimen Collected: 04/12/23  9:50 AM Last Resulted: 04/12/23 10:32 AM   Received From: [facility]  Result Received: 04/18/23  8:00 AM    View Encounter     Received Information  25-Hydroxyvitamin D2 and D3  Order: [id number]   suggestion  Information displayed in this report may not trend or trigger automated decision support.      Ref Range & Units 6 d ago   25-Hydroxy D2 ng/mL 35    25-Hydroxy D3 ng/mL 3.2    25-Hydroxy D Total ng/mL 38    Comment:    ----REFERENCE VALUE----   25-HYDROXY D TOTAL (D2+D3) Optimum levels in the healthy   population are 20-50, patients with bone disease may   benefit from higher levels within this range.     ----ADDITIONAL INFORMATION----   This test was developed and its performance characteristics determined by   [facility] in a manner consistent with CLIA requirements. This test has not   been cleared or approved by the U.S. Food and Drug Administration.   Specimen Collected: 04/12/23  9:50 AM Last Resulted: 04/16/23  2:27 PM   Received From: [facility]  Result Received: 04/18/23  8:00 AM    View Encounter     Received Information    Impression:    Sjogren's Disease-associated with mild arthritis, mild lymphopenia, and sicca. Today this remains mild with no evidence of progression. Good tolerance of the hydroxychloroquine and we will continue the regimen. Plan to get serological testing in 6 months.     Vitamin D insufficiency-plan to evaluate bone health with DEXA scan. Normal vitamin D level and we will repeat this in 6 months.    Long term management of immunosuppression-with stable eye toxicity screening. Plan to repeat this yearly.  We also agree that the benefits of continued immunosuppression outweigh the risks of COVID-19 infection. She is COVID vaccinated.    Plan follow up in 6  months.    A total of 30 minutes was spent in telephone patient interaction and chart review on the day of service.    Virtual Visit Details    Type of service:  Telephone Visit   Phone call duration: 17 minutes     Sincerely,    Grey Owens MD

## 2023-04-18 NOTE — PROGRESS NOTES
Ms. Jasmine has Sjogren's disease associated with arthritis.  CHIKI+, SSA+, SSB-, dsDNA-, RF+ (previously, now negative), HCV-, Cryo-; lip biopsy positive for SS.    She feels she is doing very well. The cevimeline is effective when used only in the morning. She feels her dryness is very stable, including her eye dryness. She uses multiple eye drops. Her teeth are healthy. She is careful with eating to prevent dysphagia. No swelling or pain around the oropharynx. She has occasional hand pains but no analgesic use.     Hydroxychloroquine 400 mg daily is well tolerated with eye evaluation normal in 2023. Vitamin D 50,000 units twice monthly, with normal vitamin D level in .    PMI:  Medical-HTN, hypothyroidism, sleep apnea on CPAP, obesity, vitamin D deficiency, gout, Sjogren's, sacroiliac joint dysfunction  Surgical-bilateral partial knee arthroplasty  Injuries-none     SH:  Retired. 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 No tobacco or EtOH. Right handed.     FH:  Sister may have SS and RA  Brother with kidney cancer  Cancer in the family  Mother  with cancer, also had RA  Father  with colon cancer  Children are healthy  Daughter with blood clots, uterine cancer s/p hysterectomy    PMSF history personally obtained and updated by me this visit.    ROS:  +irritable bowel symptoms treated with fiber  +edema in the feet  +weight gain  +worse sleep off of CPAP  +Allergy to pcn, codiene, prednisone, sulfa  Remainder of the 14 point ROS obtained and found negative.    Laboratory:      important suggestion  View Detailed Reports      important suggestion  View Condensed Results Report     Electrophoresis, Protein, Random, Urine  Order: [id number]   suggestion  Information displayed in this report may not trend or trigger automated decision support.      Ref Range & Units 6 d ago   Protein, Total, Random, U mg/dL 18    Albumin, mg/dL mg/dL 18.0    Impression   Albumin is the only protein detected.       Reviewed and interpreted by:  Anna Bergeron M.D.    Comment:    ----ADDITIONAL INFORMATION----   The total protein concentration reported here for a random urine sample is   not normalized by creatinine. For protein/creatinine ratio concentration in   random urine samples, please order RPTU / Protein/Creatinine Ratio, Random, U.   Specimen Collected: 04/12/23 10:00 AM Last Resulted: 04/13/23  5:01 PM   Received From: [facility]  Result Received: 04/18/23  8:00 AM     View Encounter      Received Information   Contains abnormal data CBC with Differential, Blood  Order: [id number]   suggestion  Information displayed in this report may not trend or trigger automated decision support.      Ref Range & Units 6 d ago   Hemoglobin 11.6 - 15.0 g/dL 13.9    Hematocrit 35.5 - 44.9 % 42.4    Erythrocytes 3.92 - 5.13 x10(12)/L 4.76    MCV 78.2 - 97.9 fL 89.1    RBC Distrib Width 12.2 - 16.1 % 13.6    Platelet Count 157 - 371 x10(9)/L 157    Leukocytes 3.4 - 9.6 x10(9)/L 3.5    Neutrophils 1.56 - 6.45 x10(9)/L 2.42    Lymphocytes 0.95 - 3.07 x10(9)/L 0.48 Low     Monocytes 0.26 - 0.81 x10(9)/L 0.41    Eosinophils 0.03 - 0.48 x10(9)/L 0.18    Basophils 0.01 - 0.08 x10(9)/L <0.03    Specimen Collected: 04/12/23  9:50 AM Last Resulted: 04/12/23 10:06 AM   Received From: [facility]  Result Received: 04/18/23  8:00 AM     View Encounter      Received Information  CRP (C-Reactive Protein)  Order: [id number]   suggestion  Information displayed in this report may not trend or trigger automated decision support.      Ref Range & Units 6 d ago   C-Reactive Protein (CRP), P <5.0 mg/L 4.7    Specimen Collected: 04/12/23  9:50 AM Last Resulted: 04/12/23 10:32 AM   Received From: [facility]  Result Received: 04/18/23  8:00 AM     View Encounter      Received Information  Sedimentation Rate  Order: [id number]   suggestion  Information displayed in this report may not trend or trigger automated  decision support.      Ref Range & Units 6 d ago   Sedimentation Rate, B 0 - 29 mm/1 h 12    Specimen Collected: 04/12/23  9:50 AM Last Resulted: 04/12/23 10:23 AM   Received From: [facility]  Result Received: 04/18/23  8:00 AM     View Encounter      Received Information  Albumin  Order: [id number]   suggestion  Information displayed in this report may not trend or trigger automated decision support.      Ref Range & Units 6 d ago   Albumin, P 3.5 - 5.0 g/dL 3.8    Specimen Collected: 04/12/23  9:50 AM Last Resulted: 04/12/23 10:32 AM   Received From: [facility]  Result Received: 04/18/23  8:00 AM     View Encounter      Received Information  ALT (Alanine Aminotransferase)  Order: [id number]   suggestion  Information displayed in this report may not trend or trigger automated decision support.      Ref Range & Units 6 d ago   Alanine Aminotransferase (ALT), P 7 - 45 U/L 20    Specimen Collected: 04/12/23  9:50 AM Last Resulted: 04/12/23 10:32 AM   Received From: [facility]  Result Received: 04/18/23  8:00 AM     View Encounter      Received Information  AST (Aspartate Aminotransferase)  Order: [id number]   suggestion  Information displayed in this report may not trend or trigger automated decision support.      Ref Range & Units 6 d ago   Aspartate Aminotransferase (AST), P 8 - 43 U/L 22    Specimen Collected: 04/12/23  9:50 AM Last Resulted: 04/12/23 10:32 AM   Received From: [facility]  Result Received: 04/18/23  8:00 AM     View Encounter      Received Information  Creatinine with Estimated GFR  Order: [id number]   suggestion  Information displayed in this report may not trend or trigger automated decision support.      Ref Range & Units 6 d ago   Creatinine 0.59 - 1.04 mg/dL 0.89    Estimated GFR (eGFR) >=60 mL/min/BSA 67    Comment: Estimated GFR calculated using the 2021   CKD_EPI creatinine equation.   Specimen Collected: 04/12/23  9:50 AM Last Resulted: 04/12/23 10:32 AM   Received From: [facility]  Result  Received: 04/18/23  8:00 AM     View Encounter      Received Information  25-Hydroxyvitamin D2 and D3  Order: [id number]   suggestion  Information displayed in this report may not trend or trigger automated decision support.      Ref Range & Units 6 d ago   25-Hydroxy D2 ng/mL 35    25-Hydroxy D3 ng/mL 3.2    25-Hydroxy D Total ng/mL 38    Comment:    ----REFERENCE VALUE----   25-HYDROXY D TOTAL (D2+D3) Optimum levels in the healthy   population are 20-50, patients with bone disease may   benefit from higher levels within this range.     ----ADDITIONAL INFORMATION----   This test was developed and its performance characteristics determined by   [facility] in a manner consistent with CLIA requirements. This test has not   been cleared or approved by the U.S. Food and Drug Administration.   Specimen Collected: 04/12/23  9:50 AM Last Resulted: 04/16/23  2:27 PM   Received From: [facility]  Result Received: 04/18/23  8:00 AM     View Encounter      Received Information    Impression:    Sjogren's Disease-associated with mild arthritis, mild lymphopenia, and sicca. Today this remains mild with no evidence of progression. Good tolerance of the hydroxychloroquine and we will continue the regimen. Plan to get serological testing in 6 months.     Vitamin D insufficiency-plan to evaluate bone health with DEXA scan. Normal vitamin D level and we will repeat this in 6 months.    Long term management of immunosuppression-with stable eye toxicity screening. Plan to repeat this yearly.  We also agree that the benefits of continued immunosuppression outweigh the risks of COVID-19 infection. She is COVID vaccinated.    Plan follow up in 6 months.    A total of 30 minutes was spent in telephone patient interaction and chart review on the day of service.

## 2023-05-01 ENCOUNTER — TELEPHONE (OUTPATIENT)
Dept: RHEUMATOLOGY | Facility: CLINIC | Age: 78
End: 2023-05-01
Payer: COMMERCIAL

## 2023-05-01 NOTE — TELEPHONE ENCOUNTER
[place] Eye Exam    Date of last eye exam specifically commenting on HCQ toxicity: 4/13/2023  Clinic: [facility] Phone Number: [phone]  Ophthalmologist: Juan Narvaez  Toxicity: NO  Records scanned to chart: Records available in Care Everywhere  Follow up: 1 year      Olivia Booker CMA   5/1/2023 9:32 AM

## 2023-10-17 ENCOUNTER — VIRTUAL VISIT (OUTPATIENT)
Dept: RHEUMATOLOGY | Facility: CLINIC | Age: 78
End: 2023-10-17
Attending: INTERNAL MEDICINE
Payer: MEDICARE

## 2023-10-17 VITALS — HEIGHT: 64 IN | WEIGHT: 293 LBS | BODY MASS INDEX: 50.02 KG/M2

## 2023-10-17 DIAGNOSIS — M35.00 SJOGREN'S SYNDROME, WITH UNSPECIFIED ORGAN INVOLVEMENT (H): ICD-10-CM

## 2023-10-17 DIAGNOSIS — N18.30 STAGE 3 CHRONIC KIDNEY DISEASE, UNSPECIFIED WHETHER STAGE 3A OR 3B CKD (H): ICD-10-CM

## 2023-10-17 DIAGNOSIS — Z79.60 LONG-TERM USE OF IMMUNOSUPPRESSANT MEDICATION: ICD-10-CM

## 2023-10-17 DIAGNOSIS — M35.00 SJOGREN'S SYNDROME WITHOUT EXTRAGLANDULAR INVOLVEMENT (H): ICD-10-CM

## 2023-10-17 DIAGNOSIS — E55.9 VITAMIN D INSUFFICIENCY: ICD-10-CM

## 2023-10-17 DIAGNOSIS — M94.9 BONE/CARTILAGE DISORDER: ICD-10-CM

## 2023-10-17 DIAGNOSIS — M89.9 BONE/CARTILAGE DISORDER: ICD-10-CM

## 2023-10-17 PROCEDURE — 99442 PR PHYSICIAN TELEPHONE EVALUATION 11-20 MIN: CPT | Mod: 95 | Performed by: INTERNAL MEDICINE

## 2023-10-17 RX ORDER — AMLODIPINE BESYLATE 10 MG/1
1 TABLET ORAL DAILY
COMMUNITY
Start: 2023-09-30

## 2023-10-17 RX ORDER — HYDROXYCHLOROQUINE SULFATE 200 MG/1
200 TABLET, FILM COATED ORAL 2 TIMES DAILY
Qty: 180 TABLET | Refills: 3 | Status: SHIPPED | OUTPATIENT
Start: 2023-10-17 | End: 2024-04-16

## 2023-10-17 RX ORDER — ERGOCALCIFEROL 1.25 MG/1
CAPSULE, LIQUID FILLED ORAL
Qty: 6 CAPSULE | Refills: 3 | Status: SHIPPED | OUTPATIENT
Start: 2023-10-17 | End: 2024-04-16

## 2023-10-17 RX ORDER — CEVIMELINE HYDROCHLORIDE 30 MG/1
30 CAPSULE ORAL 2 TIMES DAILY PRN
Qty: 180 CAPSULE | Refills: 1 | Status: CANCELLED | OUTPATIENT
Start: 2023-10-17

## 2023-10-17 ASSESSMENT — PAIN SCALES - GENERAL: PAINLEVEL: NO PAIN (0)

## 2023-10-17 NOTE — LETTER
10/17/2023       RE: Angella Jasmine  [address] [facility] [address] [facility] [address]     Dear Colleague,    Thank you for referring your patient, Angella Jasmine, to the [facility]HE[facility]OLOG[facility] [place] at [facility]. Please see a copy of my visit note below.    Virtual Visit Details    Type of service:  Telephone Visit   Phone call duration: 20 minutes     Ms. Jasmine has Sjogren's disease associated with arthritis.  CHIKI+, SSA+, SSB-, dsDNA-, RF+ (previously, now negative), HCV-, Cryo-; lip biopsy positive for SS.    She reports increasing problems of LE lymphedema. This has being treated.    She also reports that a recent eye examination reveals increased night time dryness and she reports otherwise her vision and eye health is good. She as some cataract formation.    She feels her mouth health remains good. Cevimeline 30 mg is used just once daily in the evening and she tolerates this well. She is swallowing fine and no heartburn or indigestion. No gland swelling or pain.    Her energy and joint comfort is good. No gout attacks on uloric.    Hydroxychloroquine 400 mg daily with normal eye evaluation April. Good tolerance of this medication. Vitamin D 50,000 units twice monthly.    PMI:  Medical-HTN, hypothyroidism, sleep apnea on CPAP, obesity, vitamin D deficiency, gout, Sjogren's, sacroiliac joint dysfunction  Surgical-bilateral partial knee arthroplasty  Injuries-none     SH:  Retired. 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 No tobacco or EtOH. Right handed.     FH:  Sister may have SS and RA  Brother with kidney cancer  Cancer in the family  Mother  with cancer, also had RA  Father  with colon cancer  Children are healthy  Daughter with blood clots, uterine cancer s/p hysterectomy    PMSF history personally obtained and updated by me this visit.    ROS:  +hand cramping  +high blood pressure at  times  +Allergy to pcn, codiene, prednisone, sulfa  Remainder of the 14 point ROS obtained and found negative.    Laboratory:          [facility]  Outside Information      suggestion  Information displayed in this report may not trend or trigger automated decision support.       Contains abnormal data Basic Metabolic Panel  Order: [id number]  Component  Ref Range & Units 3 wk ago   Potassium, P  3.6 - 5.2 mmol/L 4.2   Sodium, P  135 - 145 mmol/L 140   Chloride, P  98 - 107 mmol/L 102   Bicarbonate, P  22 - 29 mmol/L 25   Anion Gap, P  7 - 15 13   BUN (Blood Urea Nitrogen), P  6 - 21 mg/dL 26 High    Creatinine  0.59 - 1.04 mg/dL 1.16 High    Estimated GFR (eGFR)  >=60 mL/min/BSA 48 Low    Comment: Estimated GFR calculated using the 2021  CKD_EPI creatinine equation.   Calcium, Total, P  8.8 - 10.2 mg/dL 9.9   Glucose, P  70 - 140 mg/dL 93   Resulting Agency [facility]        [facility]  Outside Information      suggestion  Information displayed in this report may not trend or trigger automated decision support.       Contains abnormal data CBC with Differential, Blood  Order: [id number]  Component  Ref Range & Units 3 mo ago   Hemoglobin  11.6 - 15.0 g/dL 14.4   Hematocrit  35.5 - 44.9 % 43.1   Erythrocytes  3.92 - 5.13 x10(12)/L 4.85   MCV  78.2 - 97.9 fL 88.9   RBC Distrib Width  12.2 - 16.1 % 13.3   Platelet Count  157 - 371 x10(9)/L 176   Leukocytes  3.4 - 9.6 x10(9)/L 4.8   Neutrophils  1.56 - 6.45 x10(9)/L 3.50   Lymphocytes  0.95 - 3.07 x10(9)/L 0.61 Low    Monocytes  0.26 - 0.81 x10(9)/L 0.46   Eosinophils  0.03 - 0.48 x10(9)/L 0.17   Basophils  0.01 - 0.08 x10(9)/L 0.03   Resulting Agency [facility]     Specimen Collected: 06/30/23  7:49 AM    Performed by: [facility] Last Resulted: 06/30/23  8:10 AM   Received From: [facility]  Result Received: 07/06/23  7:36 AM    View Encounter          Received Information      Result Report    CBC with Differential, Blood (Order #[id number]) on 6/30/23        Order Report    CBC with Differential, Blood (Order #[id number]) on 6/30/23    Reprint Order Requisition    CBC with Differential, Blood (Order #[id number]) on 6/30/23      Tracking Links    Cosign Tracking Order Transmittal Tracking       Impression:    Sjogren's Disease-associated with mild arthritis, mild lymphopenia, and sicca. This remains very stable with use of hydroxychloroquine and cevimeline. She tolerates these medications well and we will continue the therapy. Monitor the Sjogren's serologies and inflammation markers.    Vitamin D insufficiency-plan to evaluate bone health with DEXA scan. Continue the vitamin D.    Long term management of immunosuppression-she will get her vaccinations now. Continue with lab testing now and yearly eye screening for toxicity.     Plan follow up in 6 months by phone as traveling to the [place] is a burden for the patient.    A total of 27 minutes was spent in telephone patient interaction and chart review on the day of service.        Grey Owens MD

## 2023-10-17 NOTE — PATIENT INSTRUCTIONS
Continue your medications  Get a DEXA scan  Get blood work done on October 27th  Follow up by phone in 6 months

## 2023-10-17 NOTE — PROGRESS NOTES
Virtual Visit Details    Type of service:  Telephone Visit   Phone call duration: 20 minutes     Ms. Jasmine has Sjogren's disease associated with arthritis.  CHIKI+, SSA+, SSB-, dsDNA-, RF+ (previously, now negative), HCV-, Cryo-; lip biopsy positive for SS.    She reports increasing problems of LE lymphedema. This has being treated.    She also reports that a recent eye examination reveals increased night time dryness and she reports otherwise her vision and eye health is good. She as some cataract formation.    She feels her mouth health remains good. Cevimeline 30 mg is used just once daily in the evening and she tolerates this well. She is swallowing fine and no heartburn or indigestion. No gland swelling or pain.    Her energy and joint comfort is good. No gout attacks on uloric.    Hydroxychloroquine 400 mg daily with normal eye evaluation April. Good tolerance of this medication. Vitamin D 50,000 units twice monthly.    PMI:  Medical-HTN, hypothyroidism, sleep apnea on CPAP, obesity, vitamin D deficiency, gout, Sjogren's, sacroiliac joint dysfunction  Surgical-bilateral partial knee arthroplasty  Injuries-none     SH:  Retired. 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 No tobacco or EtOH. Right handed.     FH:  Sister may have SS and RA  Brother with kidney cancer  Cancer in the family  Mother  with cancer, also had RA  Father  with colon cancer  Children are healthy  Daughter with blood clots, uterine cancer s/p hysterectomy    PMSF history personally obtained and updated by me this visit.    ROS:  +hand cramping  +high blood pressure at times  +Allergy to pcn, codiene, prednisone, sulfa  Remainder of the 14 point ROS obtained and found negative.    Laboratory:          [facility]  Outside Information      suggestion  Information displayed in this report may not trend or trigger automated decision support.       Contains abnormal data Basic Metabolic  Panel  Order: [id number]  Component  Ref Range & Units 3 wk ago   Potassium, P  3.6 - 5.2 mmol/L 4.2   Sodium, P  135 - 145 mmol/L 140   Chloride, P  98 - 107 mmol/L 102   Bicarbonate, P  22 - 29 mmol/L 25   Anion Gap, P  7 - 15 13   BUN (Blood Urea Nitrogen), P  6 - 21 mg/dL 26 High    Creatinine  0.59 - 1.04 mg/dL 1.16 High    Estimated GFR (eGFR)  >=60 mL/min/BSA 48 Low    Comment: Estimated GFR calculated using the 2021  CKD_EPI creatinine equation.   Calcium, Total, P  8.8 - 10.2 mg/dL 9.9   Glucose, P  70 - 140 mg/dL 93   Resulting Agency [facility]        [facility]  Outside Information      suggestion  Information displayed in this report may not trend or trigger automated decision support.       Contains abnormal data CBC with Differential, Blood  Order: [id number]  Component  Ref Range & Units 3 mo ago   Hemoglobin  11.6 - 15.0 g/dL 14.4   Hematocrit  35.5 - 44.9 % 43.1   Erythrocytes  3.92 - 5.13 x10(12)/L 4.85   MCV  78.2 - 97.9 fL 88.9   RBC Distrib Width  12.2 - 16.1 % 13.3   Platelet Count  157 - 371 x10(9)/L 176   Leukocytes  3.4 - 9.6 x10(9)/L 4.8   Neutrophils  1.56 - 6.45 x10(9)/L 3.50   Lymphocytes  0.95 - 3.07 x10(9)/L 0.61 Low    Monocytes  0.26 - 0.81 x10(9)/L 0.46   Eosinophils  0.03 - 0.48 x10(9)/L 0.17   Basophils  0.01 - 0.08 x10(9)/L 0.03   Resulting Agency [facility]     Specimen Collected: 06/30/23  7:49 AM    Performed by: [facility] Last Resulted: 06/30/23  8:10 AM   Received From: [facility]  Result Received: 07/06/23  7:36 AM    View Encounter          Received Information      Result Report    CBC with Differential, Blood (Order #[id number]) on 6/30/23       Order Report    CBC with Differential, Blood (Order #[id number]) on 6/30/23    Reprint Order Requisition    CBC with Differential, Blood (Order #[id number]) on 6/30/23      Tracking Links    Cosign Tracking Order Transmittal Tracking       Impression:    Sjogren's Disease-associated with mild arthritis, mild  lymphopenia, and sicca. This remains very stable with use of hydroxychloroquine and cevimeline. She tolerates these medications well and we will continue the therapy. Monitor the Sjogren's serologies and inflammation markers.    Vitamin D insufficiency-plan to evaluate bone health with DEXA scan. Continue the vitamin D.    Long term management of immunosuppression-she will get her vaccinations now. Continue with lab testing now and yearly eye screening for toxicity.     Plan follow up in 6 months by phone as traveling to the [place] is a burden for the patient.    A total of 27 minutes was spent in telephone patient interaction and chart review on the day of service.

## 2023-10-17 NOTE — NURSING NOTE
Is the patient currently in the state of [place]? YES    Visit mode:TELEPHONE    If the visit is dropped, the patient can be reconnected by: TELEPHONE VISIT: Phone number: [phone]    Will anyone else be joining the visit? NO  (If patient encounters technical issues they should call [phone])    How would you like to obtain your AVS? MyChart    Are changes needed to the allergy or medication list? No    Reason for visit: RECHECK    Medications and allergies have been reviewed.     Don FIORE

## 2023-11-10 ENCOUNTER — TELEPHONE (OUTPATIENT)
Dept: RHEUMATOLOGY | Facility: CLINIC | Age: 78
End: 2023-11-10
Payer: COMMERCIAL

## 2023-11-10 NOTE — TELEPHONE ENCOUNTER
Lab orders printed and faxed to [facility] at [phone], dexa scan orders printed and faxed to [facility] at [phone]. Confirmed successful via right fax. Patient informed orders have been faxed to [facility].      HERBERT Oneil  Rheumatology/Infectious disease  [facility]   Rheumatology ph:[phone]  Infectious Disease ph:[phone]

## 2023-11-10 NOTE — TELEPHONE ENCOUNTER
Patient Contacted for the patient to call back and schedule the following:    Appointment type: Telephone visit  Provider: Dr. Owens  Return date: April 16th 2024  Specialty phone number: [phone]  Additional appointment(s) needed: Labs and DEXA orders to be faxed to [facility]  Additonal Notes: NA

## 2024-04-16 ENCOUNTER — VIRTUAL VISIT (OUTPATIENT)
Dept: RHEUMATOLOGY | Facility: CLINIC | Age: 79
End: 2024-04-16
Attending: INTERNAL MEDICINE
Payer: MEDICARE

## 2024-04-16 VITALS — BODY MASS INDEX: 62.14 KG/M2 | WEIGHT: 293 LBS

## 2024-04-16 DIAGNOSIS — E55.9 VITAMIN D INSUFFICIENCY: ICD-10-CM

## 2024-04-16 DIAGNOSIS — N18.30 STAGE 3 CHRONIC KIDNEY DISEASE, UNSPECIFIED WHETHER STAGE 3A OR 3B CKD (H): ICD-10-CM

## 2024-04-16 DIAGNOSIS — M35.00 SJOGREN'S SYNDROME WITHOUT EXTRAGLANDULAR INVOLVEMENT (H): ICD-10-CM

## 2024-04-16 DIAGNOSIS — Z79.60 LONG-TERM USE OF IMMUNOSUPPRESSANT MEDICATION: ICD-10-CM

## 2024-04-16 DIAGNOSIS — M35.00 SJOGREN'S SYNDROME, WITH UNSPECIFIED ORGAN INVOLVEMENT (H): ICD-10-CM

## 2024-04-16 DIAGNOSIS — M89.9 BONE/CARTILAGE DISORDER: ICD-10-CM

## 2024-04-16 DIAGNOSIS — M94.9 BONE/CARTILAGE DISORDER: ICD-10-CM

## 2024-04-16 PROCEDURE — G2211 COMPLEX E/M VISIT ADD ON: HCPCS | Mod: 93 | Performed by: INTERNAL MEDICINE

## 2024-04-16 PROCEDURE — 99443 PR PHYSICIAN TELEPHONE EVALUATION 21-30 MIN: CPT | Mod: 93 | Performed by: INTERNAL MEDICINE

## 2024-04-16 RX ORDER — HYDROXYCHLOROQUINE SULFATE 200 MG/1
200 TABLET, FILM COATED ORAL 2 TIMES DAILY
Qty: 180 TABLET | Refills: 3 | Status: SHIPPED | OUTPATIENT
Start: 2024-04-16

## 2024-04-16 RX ORDER — SPIRONOLACTONE 25 MG/1
25 TABLET ORAL DAILY
COMMUNITY

## 2024-04-16 RX ORDER — CEVIMELINE HYDROCHLORIDE 30 MG/1
30 CAPSULE ORAL 2 TIMES DAILY PRN
Qty: 180 CAPSULE | Refills: 3 | Status: SHIPPED | OUTPATIENT
Start: 2024-04-16

## 2024-04-16 RX ORDER — ERGOCALCIFEROL 1.25 MG/1
CAPSULE, LIQUID FILLED ORAL
Qty: 6 CAPSULE | Refills: 3 | Status: SHIPPED | OUTPATIENT
Start: 2024-04-16

## 2024-04-16 ASSESSMENT — PAIN SCALES - GENERAL: PAINLEVEL: NO PAIN (0)

## 2024-04-16 NOTE — PROGRESS NOTES
Virtual Visit Details    Type of service:  Telephone Visit   Phone call duration: 15 minutes   Originating Location (pt. Location): Home    Distant Location (provider location):  Off-site    Virtual Visit Details    Ms. Jasmine has Sjogren's disease associated with arthritis.  CHIKI+, SSA+, SSB-, dsDNA-, RF+ (previously, now negative), HCV-, Cryo-; lip biopsy positive for SS.    She feels that her dryness in the mouth is quite stable. She denies problems with swallowing. She needs to eat more swallowing. She will use Cevimeline 30 mg at least once daily but occasionally BID. She tolerates this well.    She is taking another eye drop in the evening and this helps during the night. She is scheduled for eye evaluation this Friday. She denies any swelling of glands or lymph nodes. Her energy is stable, but her weight limits her exercise. She has lymphedema that is a barrier to exercise and weight loss. She complains of pedal edema and venous stasis skin changes, but no infection.     Her joints are pretty good, with some modest left hand 3rd digit pain in the MCP joints. This can pain her with use, but does not illicit use of tylenol. Her energy is fine. No important AM stiffness.    A DEXA scan was delayed due to scheduling on a machine that couldn't hold her weight.     Hydroxychloroquine 400 mg daily with normal eye evaluation last April. She tolerates this well. Vitamin D 50,000 units twice monthly.    PMI:  Medical-HTN, hypothyroidism, sleep apnea on CPAP, obesity, vitamin D deficiency, gout, Sjogren's, sacroiliac joint dysfunction  Surgical-bilateral partial knee arthroplasty  Injuries-none     SH:  Retired. 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 No tobacco or EtOH. Right handed.     FH:  Sister may have SS and RA  Brother with kidney cancer  Cancer in the family  Mother  with cancer, also had RA  Father  with colon cancer  Children are healthy  Daughter with  blood clots, uterine cancer s/p hysterectomy    PMSF history personally obtained and updated by me this visit.    ROS:  +as above  +Allergy to pcn, codiene, prednisone, sulfa  Remainder of the 14 point ROS obtained and found negative.    Laboratory:         [facility]  Outside Information         suggestion  Information displayed in this report may not trend or trigger automated decision support.       Contains abnormal data Basic Metabolic Panel  Order: [id number]  Component  Ref Range & Units 6 d ago   Potassium, P  3.6 - 5.2 mmol/L 4.1   Sodium, P  135 - 145 mmol/L 139   Chloride, P  98 - 107 mmol/L 103   Bicarbonate, P  22 - 29 mmol/L 27   Anion Gap, P  7 - 15 9   BUN (Blood Urea Nitrogen), P  6 - 21 mg/dL 22 High    Creatinine  0.59 - 1.04 mg/dL 0.98   Estimated GFR (eGFR)  >=60 mL/min/BSA 59 Low    Comment: Estimated GFR calculated using the 2021  CKD_EPI creatinine equation.   Calcium, Total, P  8.8 - 10.2 mg/dL 10.1   Glucose, P  70 - 140 mg/dL 93   Resulting Agency [facility]         important suggestion  View Detailed Reports      important suggestion  View Condensed Results Report      Contains abnormal data CBC with Differential, Blood  Order: [id number]   suggestion  Information displayed in this report may not trend or trigger automated decision support.     Component  Ref Range & Units 1 mo ago   Hemoglobin  11.6 - 15.0 g/dL 14.2   Hematocrit  35.5 - 44.9 % 42.5   Erythrocytes  3.92 - 5.13 x10(12)/L 4.77   MCV  78.2 - 97.9 fL 89.1   RBC Distrib Width  12.2 - 16.1 % 14.3   Platelet Count  157 - 371 x10(9)/L 183   Leukocytes  3.4 - 9.6 x10(9)/L 4.6   Neutrophils  1.56 - 6.45 x10(9)/L 3.47   Lymphocytes  0.95 - 3.07 x10(9)/L 0.45 Low    Monocytes  0.26 - 0.81 x10(9)/L 0.41   Eosinophils  0.03 - 0.48 x10(9)/L 0.21   Basophils  0.01 - 0.08 x10(9)/L 0.03       Component  Ref Range & Units 1 mo ago   Potassium, P  3.6 - 5.2 mmol/L 4.3   Sodium, P  135 - 145 mmol/L 142   Chloride, P  98 - 107 mmol/L 104    Bicarbonate, P  22 - 29 mmol/L 28   Anion Gap, P  7 - 15 10   BUN (Blood Urea Nitrogen), P  6 - 21 mg/dL 18   Creatinine  0.59 - 1.04 mg/dL 1.06 High    Estimated GFR (eGFR)  >=60 mL/min/BSA 54 Low    Comment: Estimated GFR calculated using the 2021  CKD_EPI creatinine equation.   Calcium, Total, P  8.8 - 10.2 mg/dL 10.3 High    Glucose, P  70 - 140 mg/dL 98   Protein, Total, P  6.3 - 7.9 g/dL 6.8   Albumin, P  3.5 - 5.0 g/dL 4.0   Aspartate Aminotransferase (AST), P  8 - 43 U/L 19   Alkaline Phosphatase, P  35 - 104 U/L 78   Alanine Aminotransferase (ALT), P  7 - 45 U/L 16   Bilirubin, Total, P  0.0 - 1.2 mg/dL 0.4         important suggestion  View Detailed Reports      important suggestion  View Condensed Results Report      Contains abnormal data Monoclonal Protein Study, Expanded Panel  Order: [id number]   suggestion  Information displayed in this report may not trend or trigger automated decision support.     Component  Ref Range & Units 4 mo ago   Total Protein, S  6.3 - 7.9 g/dL 6.5   Milo Free Light Chain, S  0.3300 - 1.94 mg/dL 2.59 High    Lambda Free Light Chain, S  0.5700 - 2.63 mg/dL 1.16   Kappa/Lambda FLC Ratio  0.2600 - 1.65 2.23 High    Comment: Elevated free light chain ratios between 1.66 and 3.00 may  occur due to polyclonal hypergammaglobulinemia or impaired  renal clearance. An isolated increased free light chain  ratio in this range should be interpreted with caution,  and clinical correlation is recommended.   Albumin  3.4 - 4.7 g/dL 4.2   Alpha-1 Globulin  0.1 - 0.3 g/dL 0.2   Alpha-2 Globulin  0.6 - 1.0 g/dL 0.7   Beta-Globulin  0.7 - 1.2 g/dL 0.8   Gamma-Globulin  0.6 - 1.6 g/dL 0.7   A/G Ratio 1.78   Impression No apparent monoclonal protein on serum electrophoresis.      Reviewed and interpreted by:  Anna Bergeron M.D.   Flag, Immunofixation  Negative Negative   Immunofixation No monoclonal protein detected.      Reviewed and interpreted by:  Anna Bergeron M.D.    Narrative        Immunoglobulins (IgG, IgA, and IgM)  Order: [id number]   suggestion  Information displayed in this report may not trend or trigger automated decision support.     Component  Ref Range & Units 4 mo ago   Immunoglobulin A (IgA), S  61 - 356 mg/dL 147   Immunoglobulin M (IgM), S  37 - 286 mg/dL 39   Immunoglobulin G (IgG), S  767 - 1590 mg/dL 847       Rheumatoid Factor  Order: [id number]   suggestion  Information displayed in this report may not trend or trigger automated decision support.     Component  Ref Range & Units 4 mo ago   Rheumatoid Factor, S  <15 IU/mL <10       Vitamin D, Immunoassay, Total, Serum  Order: [id number]   suggestion  Information displayed in this report may not trend or trigger automated decision support.     Component  Ref Range & Units 4 mo ago   Vitamin D, Immunoassay, Total, S  20 - 80 ng/mL 43   Comment: Optimum levels within the healthy population are 20-50,  patients with bone disease may benefit from high levels  within this range       CRP (C-Reactive Protein)  Order: [id number]   suggestion  Information displayed in this report may not trend or trigger automated decision support.     Component  Ref Range & Units 4 mo ago   C-Reactive Protein (CRP), P  <5.0 mg/L <3.0       Sedimentation Rate  Order: [id number]   suggestion  Information displayed in this report may not trend or trigger automated decision support.     Component  Ref Range & Units 4 mo ago   Sedimentation Rate, B  0 - 29 mm/1 h 11          Component      Latest Ref Rng 3/15/2024  10:58 AM   Protein Random Urine (External)      mg/dL 23    Creatinine Urine mg/dL (External)      16 - 326 mg/dL 134    Protein Total Ur per Cr (External)      <0.18 mg/mg 0.17            Impression:    Sjogren's Disease-associated with mild arthritis, mild lymphopenia, and sicca. She has very stable sicca today and no overt signs of systemic inflammatory disease. Serum Ig levels are normal and no monoclonal but some increased  Kappa free light chains and hypercalcemia. Negative RF. I will continue to monitor carefully for any signs of lymphoma development.    Vitamin D insufficiency-DEXA is still pending.    Long term management of immunosuppression-with eye toxicity screening now due.    Plan follow up in 6 months by phone as traveling to the [place] is a burden for the patient.    A total of 35 minutes was spent in telephone patient interaction and chart review on the day of service.    The longitudinal plan of care for the diagnosis(es)/condition(s) as documented were addressed during this visit. Due to the added complexity in care, I will continue to support Angella in the subsequent management and with ongoing continuity of care.

## 2024-04-16 NOTE — PATIENT INSTRUCTIONS
Plan for DEXA in the next several months that can accommodate your weight.  Plan for eye evaluation on Friday  Get laboratory testing in 6 months prior to your next appointment  Continue your current medications

## 2024-04-16 NOTE — NURSING NOTE
Is the patient currently in the state of [place]? YES    Visit mode:TELEPHONE    If the visit is dropped, the patient can be reconnected by: TELEPHONE VISIT: Phone number: [phone]    Will anyone else be joining the visit? NO  (If patient encounters technical issues they should call [phone])    How would you like to obtain your AVS? MyChart    Are changes needed to the allergy or medication list? No    Are refills needed on medications prescribed by this physician? NO    Reason for visit: RECHECK    Medications and allergies have been reviewed.      Don FIORE

## 2024-04-16 NOTE — LETTER
4/16/2024       RE: Angella Jasmine  [address] [facility] [address]     Dear Colleague,    Thank you for referring your patient, Angella Jasmine, to the [facility] [place] at [facility]. Please see a copy of my visit note below.    Virtual Visit Details    Type of service:  Telephone Visit   Phone call duration: 15 minutes   Originating Location (pt. Location): Home    Distant Location (provider location):  Off-site    Virtual Visit Details    Ms. Jasmine has Sjogren's disease associated with arthritis.  CHIKI+, SSA+, SSB-, dsDNA-, RF+ (previously, now negative), HCV-, Cryo-; lip biopsy positive for SS.    She feels that her dryness in the mouth is quite stable. She denies problems with swallowing. She needs to eat more swallowing. She will use Cevimeline 30 mg at least once daily but occasionally BID. She tolerates this well.    She is taking another eye drop in the evening and this helps during the night. She is scheduled for eye evaluation this Friday. She denies any swelling of glands or lymph nodes. Her energy is stable, but her weight limits her exercise. She has lymphedema that is a barrier to exercise and weight loss. She complains of pedal edema and venous stasis skin changes, but no infection.     Her joints are pretty good, with some modest left hand 3rd digit pain in the MCP joints. This can pain her with use, but does not illicit use of tylenol. Her energy is fine. No important AM stiffness.    A DEXA scan was delayed due to scheduling on a machine that couldn't hold her weight.     Hydroxychloroquine 400 mg daily with normal eye evaluation last April. She tolerates this well. Vitamin D 50,000 units twice monthly.    PMI:  Medical-HTN, hypothyroidism, sleep apnea on CPAP, obesity, vitamin D deficiency, gout, Sjogren's, sacroiliac joint dysfunction  Surgical-bilateral partial knee arthroplasty  Injuries-none     SH:  Retired.  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 No tobacco or EtOH. Right handed.     FH:  Sister may have SS and RA  Brother with kidney cancer  Cancer in the family  Mother  with cancer, also had RA  Father  with colon cancer  Children are healthy  Daughter with blood clots, uterine cancer s/p hysterectomy    PMSF history personally obtained and updated by me this visit.    ROS:  +as above  +Allergy to pcn, codiene, prednisone, sulfa  Remainder of the 14 point ROS obtained and found negative.    Laboratory:         [facility]  Outside Information         suggestion  Information displayed in this report may not trend or trigger automated decision support.       Contains abnormal data Basic Metabolic Panel  Order: [id number]  Component  Ref Range & Units 6 d ago   Potassium, P  3.6 - 5.2 mmol/L 4.1   Sodium, P  135 - 145 mmol/L 139   Chloride, P  98 - 107 mmol/L 103   Bicarbonate, P  22 - 29 mmol/L 27   Anion Gap, P  7 - 15 9   BUN (Blood Urea Nitrogen), P  6 - 21 mg/dL 22 High    Creatinine  0.59 - 1.04 mg/dL 0.98   Estimated GFR (eGFR)  >=60 mL/min/BSA 59 Low    Comment: Estimated GFR calculated using the   CKD_EPI creatinine equation.   Calcium, Total, P  8.8 - 10.2 mg/dL 10.1   Glucose, P  70 - 140 mg/dL 93   Resulting Agency [facility]         important suggestion  View Detailed Reports      important suggestion  View Condensed Results Report      Contains abnormal data CBC with Differential, Blood  Order: [id number]   suggestion  Information displayed in this report may not trend or trigger automated decision support.     Component  Ref Range & Units 1 mo ago   Hemoglobin  11.6 - 15.0 g/dL 14.2   Hematocrit  35.5 - 44.9 % 42.5   Erythrocytes  3.92 - 5.13 x10(12)/L 4.77   MCV  78.2 - 97.9 fL 89.1   RBC Distrib Width  12.2 - 16.1 % 14.3   Platelet Count  157 - 371 x10(9)/L 183   Leukocytes  3.4 - 9.6 x10(9)/L 4.6   Neutrophils  1.56 - 6.45 x10(9)/L 3.47    Lymphocytes  0.95 - 3.07 x10(9)/L 0.45 Low    Monocytes  0.26 - 0.81 x10(9)/L 0.41   Eosinophils  0.03 - 0.48 x10(9)/L 0.21   Basophils  0.01 - 0.08 x10(9)/L 0.03       Component  Ref Range & Units 1 mo ago   Potassium, P  3.6 - 5.2 mmol/L 4.3   Sodium, P  135 - 145 mmol/L 142   Chloride, P  98 - 107 mmol/L 104   Bicarbonate, P  22 - 29 mmol/L 28   Anion Gap, P  7 - 15 10   BUN (Blood Urea Nitrogen), P  6 - 21 mg/dL 18   Creatinine  0.59 - 1.04 mg/dL 1.06 High    Estimated GFR (eGFR)  >=60 mL/min/BSA 54 Low    Comment: Estimated GFR calculated using the 2021  CKD_EPI creatinine equation.   Calcium, Total, P  8.8 - 10.2 mg/dL 10.3 High    Glucose, P  70 - 140 mg/dL 98   Protein, Total, P  6.3 - 7.9 g/dL 6.8   Albumin, P  3.5 - 5.0 g/dL 4.0   Aspartate Aminotransferase (AST), P  8 - 43 U/L 19   Alkaline Phosphatase, P  35 - 104 U/L 78   Alanine Aminotransferase (ALT), P  7 - 45 U/L 16   Bilirubin, Total, P  0.0 - 1.2 mg/dL 0.4         important suggestion  View Detailed Reports      important suggestion  View Condensed Results Report      Contains abnormal data Monoclonal Protein Study, Expanded Panel  Order: [id number]   suggestion  Information displayed in this report may not trend or trigger automated decision support.     Component  Ref Range & Units 4 mo ago   Total Protein, S  6.3 - 7.9 g/dL 6.5   [place] Free Light Chain, S  0.3300 - 1.94 mg/dL 2.59 High    Lambda Free Light Chain, S  0.5700 - 2.63 mg/dL 1.16   Kappa/Lambda FLC Ratio  0.2600 - 1.65 2.23 High    Comment: Elevated free light chain ratios between 1.66 and 3.00 may  occur due to polyclonal hypergammaglobulinemia or impaired  renal clearance. An isolated increased free light chain  ratio in this range should be interpreted with caution,  and clinical correlation is recommended.   Albumin  3.4 - 4.7 g/dL 4.2   Alpha-1 Globulin  0.1 - 0.3 g/dL 0.2   Alpha-2 Globulin  0.6 - 1.0 g/dL 0.7   Beta-Globulin  0.7 - 1.2 g/dL 0.8   Gamma-Globulin  0.6 - 1.6  g/dL 0.7   A/G Ratio 1.78   Impression No apparent monoclonal protein on serum electrophoresis.      Reviewed and interpreted by:  Anna Bergeorn M.D.   Flag, Immunofixation  Negative Negative   Immunofixation No monoclonal protein detected.      Reviewed and interpreted by:  Anna Bergeron M.D.   Narrative        Immunoglobulins (IgG, IgA, and IgM)  Order: [id number]   suggestion  Information displayed in this report may not trend or trigger automated decision support.     Component  Ref Range & Units 4 mo ago   Immunoglobulin A (IgA), S  61 - 356 mg/dL 147   Immunoglobulin M (IgM), S  37 - 286 mg/dL 39   Immunoglobulin G (IgG), S  767 - 1590 mg/dL 847       Rheumatoid Factor  Order: [id number]   suggestion  Information displayed in this report may not trend or trigger automated decision support.     Component  Ref Range & Units 4 mo ago   Rheumatoid Factor, S  <15 IU/mL <10       Vitamin D, Immunoassay, Total, Serum  Order: [id number]   suggestion  Information displayed in this report may not trend or trigger automated decision support.     Component  Ref Range & Units 4 mo ago   Vitamin D, Immunoassay, Total, S  20 - 80 ng/mL 43   Comment: Optimum levels within the healthy population are 20-50,  patients with bone disease may benefit from high levels  within this range       CRP (C-Reactive Protein)  Order: [id number]   suggestion  Information displayed in this report may not trend or trigger automated decision support.     Component  Ref Range & Units 4 mo ago   C-Reactive Protein (CRP), P  <5.0 mg/L <3.0       Sedimentation Rate  Order: [id number]   suggestion  Information displayed in this report may not trend or trigger automated decision support.     Component  Ref Range & Units 4 mo ago   Sedimentation Rate, B  0 - 29 mm/1 h 11          Component      Latest Ref Rng 3/15/2024  10:58 AM   Protein Random Urine (External)      mg/dL 23    Creatinine Urine mg/dL (External)      16 - 326 mg/dL 134    Protein Total Ur  per Cr (External)      <0.18 mg/mg 0.17            Impression:    Sjogren's Disease-associated with mild arthritis, mild lymphopenia, and sicca. She has very stable sicca today and no overt signs of systemic inflammatory disease. Serum Ig levels are normal and no monoclonal but some increased [place] free light chains and hypercalcemia. Negative RF. I will continue to monitor carefully for any signs of lymphoma development.    Vitamin D insufficiency-DEXA is still pending.    Long term management of immunosuppression-with eye toxicity screening now due.    Plan follow up in 6 months by phone as traveling to the [place] is a burden for the patient.    A total of 35 minutes was spent in telephone patient interaction and chart review on the day of service.    The longitudinal plan of care for the diagnosis(es)/condition(s) as documented were addressed during this visit. Due to the added complexity in care, I will continue to support Angella in the subsequent management and with ongoing continuity of care.      Grey Owens MD

## 2024-04-19 ENCOUNTER — TELEPHONE (OUTPATIENT)
Dept: RHEUMATOLOGY | Facility: CLINIC | Age: 79
End: 2024-04-19
Payer: COMMERCIAL

## 2024-04-19 NOTE — TELEPHONE ENCOUNTER
Patient Contacted for the patient to call back and schedule the following:    Appointment type: Telephone return  Provider: Dr Owens  Return date: Octoebr  Specialty phone number: [phone]  Additonal Notes: Spoke with pt regarding scheduling follow up in October. Pt agreed to appt date and time

## 2024-08-10 ENCOUNTER — HEALTH MAINTENANCE LETTER (OUTPATIENT)
Age: 79
End: 2024-08-10

## 2024-10-15 ENCOUNTER — VIRTUAL VISIT (OUTPATIENT)
Dept: RHEUMATOLOGY | Facility: CLINIC | Age: 79
End: 2024-10-15
Attending: INTERNAL MEDICINE
Payer: MEDICARE

## 2024-10-15 VITALS — HEIGHT: 63 IN | BODY MASS INDEX: 51.91 KG/M2 | WEIGHT: 293 LBS

## 2024-10-15 DIAGNOSIS — Z79.60 LONG-TERM USE OF IMMUNOSUPPRESSANT MEDICATION: ICD-10-CM

## 2024-10-15 DIAGNOSIS — E55.9 VITAMIN D INSUFFICIENCY: ICD-10-CM

## 2024-10-15 DIAGNOSIS — M35.00 SJOGREN'S SYNDROME, WITH UNSPECIFIED ORGAN INVOLVEMENT (H): Primary | ICD-10-CM

## 2024-10-15 PROCEDURE — 99442 PR PHYSICIAN TELEPHONE EVALUATION 11-20 MIN: CPT | Mod: 93 | Performed by: INTERNAL MEDICINE

## 2024-10-15 ASSESSMENT — PAIN SCALES - GENERAL: PAINLEVEL: NO PAIN (0)

## 2024-10-15 NOTE — LETTER
10/15/2024       RE: Angella Jasmine  [address] [address]     Dear Colleague,    Thank you for referring your patient, Angella Jasmine, to the [facility] RHEUMATOLOGY [facility] [place] at [facility]. Please see a copy of my visit note below.    Virtual Visit Details    Type of service:  Telephone Visit   Phone call duration: 17 minutes   Originating Location (pt. Location): Home    Distant Location (provider location):  Off-site        Virtual Visit Details    Ms. Jasmine has Sjogren's disease associated with arthritis.  CHIKI+, SSA+, SSB-, dsDNA-, RF+ (previously, now negative), HCV-, Cryo-; lip biopsy positive for SS.    She reports tolerable dryness in the mouth or eyes. She has dysphagia. Her teeth are stable but poor shape. Her vision is improved post bilateral cataract surgery. She denies parotid pain or swelling.    Her energy is intermediate. She admits to high weight and deconditioning and difficulty exercise. No joint pains, swelling, redness or warmth in most the joints. The DIP joints can be deformed and swollen, but this is not painful.  No use of NSAID or tylenol.    Cevimeline 30 mg just once daily and this is well tolerated and effective.    A DEXA scan continues to be delayed because the machine can't hold her weight.     Hydroxychloroquine 400 mg daily is well tolerated and normal exam last April. Vitamin D 50,000 units twice monthly.    PMI:  Medical-HTN, hypothyroidism, sleep apnea on CPAP, obesity, vitamin D deficiency, gout, Sjogren's, sacroiliac joint dysfunction  Surgical-bilateral partial knee arthroplasty  Injuries-none     SH:  Retired. 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 No tobacco or EtOH. Right handed.     FH:  Sister may have SS and RA  Brother with kidney cancer  Cancer in the family  Mother  with cancer, also had RA  Father  with colon cancer  Children are  healthy  Daughter with blood clots, uterine cancer s/p hysterectomy    PMSF history personally obtained and updated by me this visit.    ROS:  +lymphedema but no skin ulceration  +Allergy to pcn, codiene, prednisone, sulfa  Remainder of the 14 point ROS obtained and found negative.    Laboratory:      important suggestion  View Detailed Reports      important suggestion  View Condensed Results Report      Contains abnormal data CBC with Differential, Blood  Order: [id number]   suggestion  Information displayed in this report may not trend or trigger automated decision support.     Component  Ref Range & Units 3 mo ago   Hemoglobin  11.6 - 15.0 g/dL 15.1 High    Hematocrit  35.5 - 44.9 % 45.9 High    Erythrocytes  3.92 - 5.13 x10(12)/L 5.14 High    MCV  78.2 - 97.9 fL 89.3   RBC Distrib Width  12.2 - 16.1 % 14.0   Platelet Count  157 - 371 x10(9)/L 173   Leukocytes  3.4 - 9.6 x10(9)/L 4.9   Neutrophils  1.56 - 6.45 x10(9)/L 3.88   Lymphocytes  0.95 - 3.07 x10(9)/L 0.42 Low    Monocytes  0.26 - 0.81 x10(9)/L 0.42   Eosinophils  0.03 - 0.48 x10(9)/L 0.11   Basophils  0.01 - 0.08 x10(9)/L 0.03     Specimen Collected: 07/12/24  1:36 PM    Performed by: [facility] Last Resulted: 07/12/24  1:43 PM   Received From: [facility]  Result Received: 10/15/24  8:02 AM    View Encounter        Received Information   Contains abnormal data Basic Metabolic Panel  Order: [id number]   suggestion  Information displayed in this report may not trend or trigger automated decision support.     Component  Ref Range & Units 3 mo ago   Potassium, P  3.6 - 5.2 mmol/L 4.3   Sodium, P  135 - 145 mmol/L 140   Chloride, P  98 - 107 mmol/L 103   Bicarbonate, P  22 - 29 mmol/L 27   Anion Gap, P  7 - 15 10   BUN (Blood Urea Nitrogen), P  6 - 21 mg/dL 23 High    Creatinine  0.59 - 1.04 mg/dL 0.96   Estimated GFR (eGFR)  >=60 mL/min/BSA 61   Comment: Estimated GFR calculated using the 2021  CKD_EPI creatinine equation.    Calcium, Total, P  8.8 - 10.2 mg/dL 10.1   Glucose, P  70 - 140 mg/dL 98     Specimen Collected: 07/12/24  1:36 PM    Performed by: [facility] Last Resulted: 07/12/24  2:03 PM   Received From: [facility]  Result Received: 10/15/24  8:02 AM    View Encounter        Received Information  Thyroid Function Cascade  Order: [id number]   suggestion  Information displayed in this report may not trend or trigger automated decision support.     Component  Ref Range & Units 3 mo ago   TSH, Sensitive  0.3 - 4.2 mIU/L 4.1     Specimen Collected: 07/12/24  1:36 PM    Performed by: [facility] Last Resulted: 07/12/24  2:33 PM   Received From: [facility]  Result Received: 10/15/24  8:02 AM    View Encounter        Received Information      important suggestion  View Detailed Reports      important suggestion  View Condensed Results Report     Cryoglobulin  Order: [id number]   suggestion  Information displayed in this report may not trend or trigger automated decision support.     Component  Ref Range & Units 10 mo ago   Cryoglobulin, S  Negative %ppt Negative. This test is negative at 24 hours. All samples are held and reviewed again at 7 days. If delayed precipitation occurs after 7 days, Immunofixation will be performed and an additional report will follow.     Specimen Collected: 12/01/23  9:39 AM    Performed by: [facility] Last Resulted: 12/05/23 11:32 AM   Received From: [facility]  Result Received: 03/15/24 12:31 PM    View Encounter        Received Information  External Lab Results  Order: [id number]  Collected 12/1/2023  9:39 AM       Status: Edited Result - FINAL    0 Result Notes      Component 10 mo ago   Scan Lab Results (External) See Scanned Report   Comment: Cryoglobulin  Monoclonal Protein Study, Expanded Panel        View Full Report        Narrative    Verified by Laura Valentine on 12/06/2023.      Specimen Collected:  12/01/23  9:39 AM Last Resulted: 12/06/23  4:50 PM             Linked Documents    View Image      Result Care Coordination      Patient Communication     Add Comments   Add Notifications  Back to Top        IgA  Order: [id number]  Collected 12/1/2023  9:39 AM       Status: Edited Result - FINAL    0 Result Notes      Component  Ref Range & Units 10 mo ago   IgA (External)  61 - 356 mg/dL 147        View Full Report        Narrative    Verified by Laura Valentine on 12/06/2023.      Specimen Collected: 12/01/23  9:39 AM Last Resulted: 12/06/23  4:50 PM             Linked Documents    View Image      Result Care Coordination      Patient Communication     Add Comments   Add Notifications  Back to Top        IgG  Order: [id number]  Collected 12/1/2023  9:39 AM       Status: Edited Result - FINAL    0 Result Notes      Component  Ref Range & Units 10 mo ago   IgG (External)  767 - 1,590 mg/dL 847        View Full Report        Narrative    Verified by Laura Valentine on 12/06/2023.      Specimen Collected: 12/01/23  9:39 AM Last Resulted: 12/06/23  4:50 PM             Linked Documents    View Image      Result Care Coordination      Patient Communication     Add Comments   Add Notifications  Back to Top        IgM  Order: [id number]  Collected 12/1/2023  9:39 AM       Status: Edited Result - FINAL    0 Result Notes      Component  Ref Range & Units 10 mo ago   IgM (External)  37 - 286 mg/dL 39        View Full Report        Narrative    Verified by Laura Valentine on 12/06/2023.      Specimen Collected: 12/01/23  9:39 AM Last Resulted: 12/06/23  4:50 PM             Linked Documents    View Image      Result Care Coordination      Patient Communication     Add Comments   Add Notifications  Back to Top        Erythrocyte sedimentation rate auto  Order: [id number]  Collected 12/1/2023  9:39 AM       Status: Edited Result - FINAL    0 Result Notes       1 Patient Communication      Component  Ref Range & Units 10 mo ago   ESR  (External)  0 - 29 mm/h 11        View Full Report        Narrative    Verified by Arash Elizabeth on 12/05/2023.      Specimen Collected: 12/01/23  9:39 AM Last Resulted: 12/05/23  6:35 AM             Linked Documents    View Image      Result Care Coordination      Patient Communication     Edit Comments   Add Notifications  Back to Top    Here are your recent test results which are normal.     These results do not require a change.  Please discuss at your next visit.  It was a pleasure to see you at your recent visit. Please let me know if you have any questions or concerns. ...   Written by Grey Owens MD on 12/7/2023  3:24 PM CST View Full Comments     Albumin level  Order: [id number]  Collected 12/1/2023  9:39 AM       Status: Edited Result - FINAL    0 Result Notes       1 Patient Communication      Component  Ref Range & Units 10 mo ago   Albumin (External)  3.5 - 5.0 g/dL 4.0        View Full Report        Narrative    Verified by Sunny Sesay on 12/05/2023.      Specimen Collected: 12/01/23  9:39 AM Last Resulted: 12/05/23  6:33 AM             Linked Documents    View Image      Result Care Coordination      Patient Communication     Edit Comments   Add Notifications  Back to Top    These results do not require a change.  Please discuss at your next visit.  It was a pleasure to see you at your recent visit. Please let me know if you have any questions or concerns.     Grey Owens MD ...   Written by Grey Owens MD on 12/7/2023  3:26 PM CST View Full Comments     ALT  Order: [id number]  Collected 12/1/2023  9:39 AM       Status: Edited Result - FINAL    0 Result Notes       1 Patient Communication      Component  Ref Range & Units 10 mo ago   ALT (External)  7 - 45 U/L 24        View Full Report        Narrative    Verified by Sunny Sesay on 12/05/2023.      Specimen Collected: 12/01/23  9:39 AM Last Resulted: 12/05/23  6:33 AM             Linked Documents    View Image      Result  Care Coordination      Patient Communication     Edit Comments   Add Notifications  Back to Top    These results do not require a change.  Please discuss at your next visit.  It was a pleasure to see you at your recent visit. Please let me know if you have any questions or concerns.     Grey Owens MD ...   Written by Grey Owens MD on 12/7/2023  3:26 PM CST View Full Comments     AST  Order: [id number]  Collected 12/1/2023  9:39 AM       Status: Edited Result - FINAL    0 Result Notes       1 Patient Communication      Component  Ref Range & Units 10 mo ago   AST (External)  8 - 43 U/L 23        View Full Report        Narrative    Verified by Sunny Sesay on 12/05/2023.      Specimen Collected: 12/01/23  9:39 AM Last Resulted: 12/05/23  6:32 AM             Linked Documents    View Image      Result Care Coordination      Patient Communication     Edit Comments   Add Notifications  Back to Top    These results do not require a change.  Please discuss at your next visit.  It was a pleasure to see you at your recent visit. Please let me know if you have any questions or concerns.     Grey Owens MD ...   Written by Grey Owens MD on 12/7/2023  3:26 PM CST View Full Comments     CRP inflammation  Order: [id number]  Collected 12/1/2023  9:39 AM       Status: Edited Result - FINAL    0 Result Notes       1 Patient Communication      Component  Ref Range & Units 10 mo ago   CRP Inflammation (External)  <5.0 mg/L <3.0        View Full Report        Narrative    Verified by Sunny Sesay on 12/05/2023.      Specimen Collected: 12/01/23  9:39 AM Last Resulted: 12/05/23  6:32 AM             Linked Documents    View Image      Result Care Coordination      Patient Communication     Edit Comments   Add Notifications  Back to Top    These results do not require a change.  Please discuss at your next visit.  It was a pleasure to see you at your recent visit. Please let me know if you have any questions  or concerns.     Grey Owens MD ...   Written by Grey Owens MD on 12/7/2023  3:26 PM CST View Full Comments      Contains abnormal data Basic metabolic panel  Order: [id number]  Collected 12/1/2023  9:39 AM       Status: Edited Result - FINAL    0 Result Notes       1 Patient Communication       1  Topic      Component  Ref Range & Units 10 mo ago   Potassium (External)  3.6 - 5.2 mmol/L 4.3   Sodium (External)  135 - 145 mmol/L 138   Chloride (External)  98 - 107 mmol/L 103   CO2 (External)  22 - 29 mmol/L 28   Anion Gap (External)  7 - 15 7   Urea Nitrogen (External)  6 - 21 mg/dL 18   Creatinine (External)  0.59 - 1.04 mg/dL 1.05 High    GFR Estimated (External)  >=60 ml/min/BSA 54 Low    Calcium (External)  8.8 - 10.2 mg/dL 10.0   Glucose (External)  70 - 140 mg/dL 94        View Full Report        Narrative    Verified by Sunny Sesay on 12/05/2023.      Specimen Collected: 12/01/23  9:39 AM Last Resulted: 12/05/23  6:32 AM             Linked Documents    View Image      Result Care Coordination      Patient Communication     Edit Comments   Add Notifications  Back to Top    These results do not require a change.  Please discuss at your next visit.  It was a pleasure to see you at your recent visit. Please let me know if you have any questions or concerns.     Grey Owens MD ...   Written by Grey Owens MD on 12/7/2023  3:26 PM CST View Full Comments       Satisfied Health Maintenance Topics     Back to Top  BMP (Yearly)  Ordered on 4/16/2024  Address Topic         Contains abnormal data CBC with Platelets & Differential  Order: [id number]  Collected 12/1/2023  9:39 AM       Status: Edited Result - FINAL    0 Result Notes       1 Patient Communication       1  Topic      Component  Ref Range & Units 10 mo ago   Hemoglobin (External)  11.6 - 15.0 G/DL 14.0   Hematocrit (External)  35.5 - 44.9 % 43.4   RBC Count (External)  3.02 - 5.13 x10(12)/L 4.80   MCV (External)  78.2 - 97.9 fL  89.3   RDW (External)  12.2 - 16.1 % 13.9   Platelet Count (External)  157 - 371 x10(9)/L 189   WBC Count (External)  3.4 - 9.6 x10(9)/L 3.6   Absolute Neutrophils (External)  1.56 - 6.45 x10(9)/L 2.45   Absolute Lymphocytes (External)  0.95 - 3.07 x10(9)/L 0.46 Low    Absolute Monocytes (External)  0.26 - 0.81 x10(9)/L 0.36   Absolute Eosinophils (External)  0.03 - 0.48 x10(9)/L 0.25   Absolute Basophils (External)  0.01 - 0.08 x10(9)/L 0.03        View Full Report        Narrative    Verified by Sunny Sesay on 12/05/2023.      Specimen Collected: 12/01/23  9:39 AM Last Resulted: 12/05/23  6:27 AM             Linked Documents    View Image      Result Care Coordination      Patient Communication     Edit Comments   Add Notifications  Back to Top    These results do not require a change.  Please discuss at your next visit.  It was a pleasure to see you at your recent visit. Please let me know if you have any questions or concerns.     Grey Owens MD ...   Written by Grey Owens MD on 12/7/2023  3:26 PM CST View Full Comments       Satisfied Health Maintenance Topics     Back to Top  HEMOGLOBIN (Yearly)  Due soon on 12/1/2024  Address Topic        External Lab Results  Order: [id number]  Collected 12/1/2023  9:39 AM       Status: Edited Result - FINAL    0 Result Notes       1 Patient Communication      Component 10 mo ago   Scan Lab Results (External) See Scanned Report   Comment: Rheumatoid Factor, S        View Full Report        Narrative    Verified by Sunny Sesay on 12/05/2023.      Specimen Collected: 12/01/23  9:39 AM Last Resulted: 12/05/23  6:20 AM             Linked Documents    View Image      Result Care Coordination      Patient Communication     Edit Comments   Add Notifications  Back to Top    These results do not require a change.  Please discuss at your next visit.  It was a pleasure to see you at your recent visit. Please let me know if you have any questions or concerns.      Grey Owens MD ...   Written by Grey Owens MD on 12/7/2023  3:26 PM CST View Full Comments     Vitamin D Deficiency  Order: [id number]  Collected 12/1/2023  9:39 AM       Status: Edited Result - FINAL    0 Result Notes       1 Patient Communication      Component  Ref Range & Units 10 mo ago   Vitamin D Deficiency Screening (External)  20 - 80 ng/mL 43        View Full Report        Narrative    Verified by Arash Elizabeth on 12/05/2023.      Specimen Collected: 12/01/23  9:39 AM Last Resulted: 12/05/23  6:16 AM             Linked Documents    View Image      Result Care Coordination      Patient Communication     Edit Comments   Add Notifications  Back to Top    These results do not require a change.  Please discuss at your next visit.  It was a pleasure to see you at your recent visit. Please let me know if you have any questions or concerns.     Grey Owens MD ...   Written by Grey Owens MD on 12/7/2023  3:26 PM CST View Full Comments       Impression:    Sjogren's Disease-associated with mild arthritis, mild lymphopenia, sicca, with increased [place] free light chains. This is very stable and quiet today and I appreciate no signs of active systemic inflammatory disease. Good tolerance of the cevimeline and hydroxychloroquine and we will continue the regimen. Get repeat serologies and inflammatory markers in December.     Vitamin D insufficiency-with DEXA testing on hold and the vitamin D level is normal. No fractures.    Long term management of immunosuppression-with normal eye toxicity screening. Plan to repeat the eye exam in April and lab testing every 6 months.    Plan follow up in 6 months by phone as traveling to the [place] is a burden for the patient.    A total of 25 minutes was spent in telephone patient interaction and chart review on the day of service.    The longitudinal plan of care for the diagnosis(es)/condition(s) as documented were addressed during this visit. Due  to the added complexity in care, I will continue to support Angella in the subsequent management and with ongoing continuity of care.        Again, thank you for allowing me to participate in the care of your patient.      Sincerely,    Grey Owesn MD

## 2024-10-15 NOTE — PROGRESS NOTES
Virtual Visit Details    Type of service:  Telephone Visit   Phone call duration: 17 minutes   Originating Location (pt. Location): Home    Distant Location (provider location):  Off-site        Virtual Visit Details    Ms. Jasmine has Sjogren's disease associated with arthritis.  CHIKI+, SSA+, SSB-, dsDNA-, RF+ (previously, now negative), HCV-, Cryo-; lip biopsy positive for SS.    She reports tolerable dryness in the mouth or eyes. She has dysphagia. Her teeth are stable but poor shape. Her vision is improved post bilateral cataract surgery. She denies parotid pain or swelling.    Her energy is intermediate. She admits to high weight and deconditioning and difficulty exercise. No joint pains, swelling, redness or warmth in most the joints. The DIP joints can be deformed and swollen, but this is not painful.  No use of NSAID or tylenol.    Cevimeline 30 mg just once daily and this is well tolerated and effective.    A DEXA scan continues to be delayed because the machine can't hold her weight.     Hydroxychloroquine 400 mg daily is well tolerated and normal exam last April. Vitamin D 50,000 units twice monthly.    PMI:  Medical-HTN, hypothyroidism, sleep apnea on CPAP, obesity, vitamin D deficiency, gout, Sjogren's, sacroiliac joint dysfunction  Surgical-bilateral partial knee arthroplasty  Injuries-none     SH:  Retired. 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 No tobacco or EtOH. Right handed.     FH:  Sister may have SS and RA  Brother with kidney cancer  Cancer in the family  Mother  with cancer, also had RA  Father  with colon cancer  Children are healthy  Daughter with blood clots, uterine cancer s/p hysterectomy    PMSF history personally obtained and updated by me this visit.    ROS:  +lymphedema but no skin ulceration  +Allergy to pcn, codiene, prednisone, sulfa  Remainder of the 14 point ROS obtained and found negative.    Laboratory:      important  suggestion  View Detailed Reports      important suggestion  View Condensed Results Report      Contains abnormal data CBC with Differential, Blood  Order: [id number]   suggestion  Information displayed in this report may not trend or trigger automated decision support.     Component  Ref Range & Units 3 mo ago   Hemoglobin  11.6 - 15.0 g/dL 15.1 High    Hematocrit  35.5 - 44.9 % 45.9 High    Erythrocytes  3.92 - 5.13 x10(12)/L 5.14 High    MCV  78.2 - 97.9 fL 89.3   RBC Distrib Width  12.2 - 16.1 % 14.0   Platelet Count  157 - 371 x10(9)/L 173   Leukocytes  3.4 - 9.6 x10(9)/L 4.9   Neutrophils  1.56 - 6.45 x10(9)/L 3.88   Lymphocytes  0.95 - 3.07 x10(9)/L 0.42 Low    Monocytes  0.26 - 0.81 x10(9)/L 0.42   Eosinophils  0.03 - 0.48 x10(9)/L 0.11   Basophils  0.01 - 0.08 x10(9)/L 0.03     Specimen Collected: 07/12/24  1:36 PM    [facility] Last Resulted: 07/12/24  1:43 PM   Received From: [facility]  Result Received: 10/15/24  8:02 AM    View Encounter        Received Information   Contains abnormal data Basic Metabolic Panel  Order: [id number]   suggestion  Information displayed in this report may not trend or trigger automated decision support.     Component  Ref Range & Units 3 mo ago   Potassium, P  3.6 - 5.2 mmol/L 4.3   Sodium, P  135 - 145 mmol/L 140   Chloride, P  98 - 107 mmol/L 103   Bicarbonate, P  22 - 29 mmol/L 27   Anion Gap, P  7 - 15 10   BUN (Blood Urea Nitrogen), P  6 - 21 mg/dL 23 High    Creatinine  0.59 - 1.04 mg/dL 0.96   Estimated GFR (eGFR)  >=60 mL/min/BSA 61   Comment: Estimated GFR calculated using the 2021  CKD_EPI creatinine equation.   Calcium, Total, P  8.8 - 10.2 mg/dL 10.1   Glucose, P  70 - 140 mg/dL 98     Specimen Collected: 07/12/24  1:36 PM    [facility] Last Resulted: 07/12/24  2:03 PM   Received From: [facility]  Result Received: 10/15/24  8:02 AM    View Encounter        Received  Information  Thyroid Function Cascade  Order: [id number]   suggestion  Information displayed in this report may not trend or trigger automated decision support.     Component  Ref Range & Units 3 mo ago   TSH, Sensitive  0.3 - 4.2 mIU/L 4.1     Specimen Collected: 07/12/24  1:36 PM    Performed by: [facility] Last Resulted: 07/12/24  2:33 PM   Received From: [facility]  Result Received: 10/15/24  8:02 AM    View Encounter        Received Information      important suggestion  View Detailed Reports      important suggestion  View Condensed Results Report     Cryoglobulin  Order: [id number]   suggestion  Information displayed in this report may not trend or trigger automated decision support.     Component  Ref Range & Units 10 mo ago   Cryoglobulin, S  Negative %ppt Negative. This test is negative at 24 hours. All samples are held and reviewed again at 7 days. If delayed precipitation occurs after 7 days, Immunofixation will be performed and an additional report will follow.     Specimen Collected: 12/01/23  9:39 AM    Performed by: [facility] Last Resulted: 12/05/23 11:32 AM   Received From: [facility]  Result Received: 03/15/24 12:31 PM    View Encounter        Received Information  External Lab Results  Order: [id number]  Collected 12/1/2023  9:39 AM       Status: Edited Result - FINAL    0 Result Notes      Component 10 mo ago   Scan Lab Results (External) See Scanned Report   Comment: Cryoglobulin  Monoclonal Protein Study, Expanded Panel        View Full Report        Narrative    Verified by Laura Valentine on 12/06/2023.      Specimen Collected: 12/01/23  9:39 AM Last Resulted: 12/06/23  4:50 PM             Linked Documents    View Image      Result Care Coordination      Patient Communication     Add Comments   Add Notifications  Back to Top        IgA  Order: [id number]  Collected 12/1/2023  9:39 AM       Status: Edited Result - FINAL    0 Result  Notes      Component  Ref Range & Units 10 mo ago   IgA (External)  61 - 356 mg/dL 147        View Full Report        Narrative    Verified by Laura Valentine on 12/06/2023.      Specimen Collected: 12/01/23  9:39 AM Last Resulted: 12/06/23  4:50 PM             Linked Documents    View Image      Result Care Coordination      Patient Communication     Add Comments   Add Notifications  Back to Top        IgG  Order: [id number]  Collected 12/1/2023  9:39 AM       Status: Edited Result - FINAL    0 Result Notes      Component  Ref Range & Units 10 mo ago   IgG (External)  767 - 1,590 mg/dL 847        View Full Report        Narrative    Verified by Laura Valentine on 12/06/2023.      Specimen Collected: 12/01/23  9:39 AM Last Resulted: 12/06/23  4:50 PM             Linked Documents    View Image      Result Care Coordination      Patient Communication     Add Comments   Add Notifications  Back to Top        IgM  Order: [id number]  Collected 12/1/2023  9:39 AM       Status: Edited Result - FINAL    0 Result Notes      Component  Ref Range & Units 10 mo ago   IgM (External)  37 - 286 mg/dL 39        View Full Report        Narrative    Verified by Laura Valentine on 12/06/2023.      Specimen Collected: 12/01/23  9:39 AM Last Resulted: 12/06/23  4:50 PM             Linked Documents    View Image      Result Care Coordination      Patient Communication     Add Comments   Add Notifications  Back to Top        Erythrocyte sedimentation rate auto  Order: [id number]  Collected 12/1/2023  9:39 AM       Status: Edited Result - FINAL    0 Result Notes       1 Patient Communication      Component  Ref Range & Units 10 mo ago   ESR (External)  0 - 29 mm/h 11        View Full Report        Narrative    Verified by Arash Elizabeth on 12/05/2023.      Specimen Collected: 12/01/23  9:39 AM Last Resulted: 12/05/23  6:35 AM             Linked Documents    View Image      Result Care Coordination      Patient Communication     Edit Comments    Add Notifications  Back to Top    Here are your recent test results which are normal.     These results do not require a change.  Please discuss at your next visit.  It was a pleasure to see you at your recent visit. Please let me know if you have any questions or concerns. ...   Written by Grey Owens MD on 12/7/2023  3:24 PM CST View Full Comments     Albumin level  Order: [id number]  Collected 12/1/2023  9:39 AM       Status: Edited Result - FINAL    0 Result Notes       1 Patient Communication      Component  Ref Range & Units 10 mo ago   Albumin (External)  3.5 - 5.0 g/dL 4.0        View Full Report        Narrative    Verified by Sunny Sesay on 12/05/2023.      Specimen Collected: 12/01/23  9:39 AM Last Resulted: 12/05/23  6:33 AM             Linked Documents    View Image      Result Care Coordination      Patient Communication     Edit Comments   Add Notifications  Back to Top    These results do not require a change.  Please discuss at your next visit.  It was a pleasure to see you at your recent visit. Please let me know if you have any questions or concerns.     Grey Owens MD ...   Written by Grey Owens MD on 12/7/2023  3:26 PM CST View Full Comments     ALT  Order: [id number]  Collected 12/1/2023  9:39 AM       Status: Edited Result - FINAL    0 Result Notes       1 Patient Communication      Component  Ref Range & Units 10 mo ago   ALT (External)  7 - 45 U/L 24        View Full Report        Narrative    Verified by Sunny Sesay on 12/05/2023.      Specimen Collected: 12/01/23  9:39 AM Last Resulted: 12/05/23  6:33 AM             Linked Documents    View Image      Result Care Coordination      Patient Communication     Edit Comments   Add Notifications  Back to Top    These results do not require a change.  Please discuss at your next visit.  It was a pleasure to see you at your recent visit. Please let me know if you have any questions or concerns.     Grey Owens MD ...    Written by Grey Owens MD on 12/7/2023  3:26 PM CST View Full Comments     AST  Order: [id number]  Collected 12/1/2023  9:39 AM       Status: Edited Result - FINAL    0 Result Notes       1 Patient Communication      Component  Ref Range & Units 10 mo ago   AST (External)  8 - 43 U/L 23        View Full Report        Narrative    Verified by Sunny Sesay on 12/05/2023.      Specimen Collected: 12/01/23  9:39 AM Last Resulted: 12/05/23  6:32 AM             Linked Documents    View Image      Result Care Coordination      Patient Communication     Edit Comments   Add Notifications  Back to Top    These results do not require a change.  Please discuss at your next visit.  It was a pleasure to see you at your recent visit. Please let me know if you have any questions or concerns.     Grey Owens MD ...   Written by Grey Owens MD on 12/7/2023  3:26 PM CST View Full Comments     CRP inflammation  Order: [id number]  Collected 12/1/2023  9:39 AM       Status: Edited Result - FINAL    0 Result Notes       1 Patient Communication      Component  Ref Range & Units 10 mo ago   CRP Inflammation (External)  <5.0 mg/L <3.0        View Full Report        Narrative    Verified by Sunny Sesay on 12/05/2023.      Specimen Collected: 12/01/23  9:39 AM Last Resulted: 12/05/23  6:32 AM             Linked Documents    View Image      Result Care Coordination      Patient Communication     Edit Comments   Add Notifications  Back to Top    These results do not require a change.  Please discuss at your next visit.  It was a pleasure to see you at your recent visit. Please let me know if you have any questions or concerns.     Grey Owens MD ...   Written by Grey Owens MD on 12/7/2023  3:26 PM CST View Full Comments      Contains abnormal data Basic metabolic panel  Order: [id number]  Collected 12/1/2023  9:39 AM       Status: Edited Result - FINAL    0 Result Notes       1 Patient Communication       1 HM  Topic      Component  Ref Range & Units 10 mo ago   Potassium (External)  3.6 - 5.2 mmol/L 4.3   Sodium (External)  135 - 145 mmol/L 138   Chloride (External)  98 - 107 mmol/L 103   CO2 (External)  22 - 29 mmol/L 28   Anion Gap (External)  7 - 15 7   Urea Nitrogen (External)  6 - 21 mg/dL 18   Creatinine (External)  0.59 - 1.04 mg/dL 1.05 High    GFR Estimated (External)  >=60 ml/min/BSA 54 Low    Calcium (External)  8.8 - 10.2 mg/dL 10.0   Glucose (External)  70 - 140 mg/dL 94        View Full Report        Narrative    Verified by Sunny Sesay on 12/05/2023.      Specimen Collected: 12/01/23  9:39 AM Last Resulted: 12/05/23  6:32 AM             Linked Documents    View Image      Result Care Coordination      Patient Communication     Edit Comments   Add Notifications  Back to Top    These results do not require a change.  Please discuss at your next visit.  It was a pleasure to see you at your recent visit. Please let me know if you have any questions or concerns.     Grey Owens MD ...   Written by Grey Owens MD on 12/7/2023  3:26 PM CST View Full Comments       Satisfied Health Maintenance Topics     Back to Top  BMP (Yearly)  Ordered on 4/16/2024  Address Topic         Contains abnormal data CBC with Platelets & Differential  Order: [id number]  Collected 12/1/2023  9:39 AM       Status: Edited Result - FINAL    0 Result Notes       1 Patient Communication       1  Topic      Component  Ref Range & Units 10 mo ago   Hemoglobin (External)  11.6 - 15.0 G/DL 14.0   Hematocrit (External)  35.5 - 44.9 % 43.4   RBC Count (External)  3.02 - 5.13 x10(12)/L 4.80   MCV (External)  78.2 - 97.9 fL 89.3   RDW (External)  12.2 - 16.1 % 13.9   Platelet Count (External)  157 - 371 x10(9)/L 189   WBC Count (External)  3.4 - 9.6 x10(9)/L 3.6   Absolute Neutrophils (External)  1.56 - 6.45 x10(9)/L 2.45   Absolute Lymphocytes (External)  0.95 - 3.07 x10(9)/L 0.46 Low    Absolute Monocytes (External)  0.26 - 0.81  x10(9)/L 0.36   Absolute Eosinophils (External)  0.03 - 0.48 x10(9)/L 0.25   Absolute Basophils (External)  0.01 - 0.08 x10(9)/L 0.03        View Full Report        Narrative    Verified by Sunny Sesay on 12/05/2023.      Specimen Collected: 12/01/23  9:39 AM Last Resulted: 12/05/23  6:27 AM             Linked Documents    View Image      Result Care Coordination      Patient Communication     Edit Comments   Add Notifications  Back to Top    These results do not require a change.  Please discuss at your next visit.  It was a pleasure to see you at your recent visit. Please let me know if you have any questions or concerns.     Grey Owens MD ...   Written by Grey Owens MD on 12/7/2023  3:26 PM CST View Full Comments       Satisfied Health Maintenance Topics     Back to Top  HEMOGLOBIN (Yearly)  Due soon on 12/1/2024  Address Topic        External Lab Results  Order: [id number]  Collected 12/1/2023  9:39 AM       Status: Edited Result - FINAL    0 Result Notes       1 Patient Communication      Component 10 mo ago   Scan Lab Results (External) See Scanned Report   Comment: Rheumatoid Factor, S        View Full Report        Narrative    Verified by Sunny Sesay on 12/05/2023.      Specimen Collected: 12/01/23  9:39 AM Last Resulted: 12/05/23  6:20 AM             Linked Documents    View Image      Result Care Coordination      Patient Communication     Edit Comments   Add Notifications  Back to Top    These results do not require a change.  Please discuss at your next visit.  It was a pleasure to see you at your recent visit. Please let me know if you have any questions or concerns.     Grey Owens MD ...   Written by Grey Owens MD on 12/7/2023  3:26 PM CST View Full Comments     Vitamin D Deficiency  Order: [id number]  Collected 12/1/2023  9:39 AM       Status: Edited Result - FINAL    0 Result Notes       1 Patient Communication      Component  Ref Range & Units 10 mo ago   Vitamin D  Deficiency Screening (External)  20 - 80 ng/mL 43        View Full Report        Narrative    Verified by Arash Elizabeth on 12/05/2023.      Specimen Collected: 12/01/23  9:39 AM Last Resulted: 12/05/23  6:16 AM             Linked Documents    View Image      Result Care Coordination      Patient Communication     Edit Comments   Add Notifications  Back to Top    These results do not require a change.  Please discuss at your next visit.  It was a pleasure to see you at your recent visit. Please let me know if you have any questions or concerns.     Grey Owens MD ...   Written by Grey Owens MD on 12/7/2023  3:26 PM CST View Full Comments       Impression:    Sjogren's Disease-associated with mild arthritis, mild lymphopenia, sicca, with increased [place] free light chains. This is very stable and quiet today and I appreciate no signs of active systemic inflammatory disease. Good tolerance of the cevimeline and hydroxychloroquine and we will continue the regimen. Get repeat serologies and inflammatory markers in December.     Vitamin D insufficiency-with DEXA testing on hold and the vitamin D level is normal. No fractures.    Long term management of immunosuppression-with normal eye toxicity screening. Plan to repeat the eye exam in April and lab testing every 6 months.    Plan follow up in 6 months by phone as traveling to the [place] is a burden for the patient.    A total of 25 minutes was spent in telephone patient interaction and chart review on the day of service.    The longitudinal plan of care for the diagnosis(es)/condition(s) as documented were addressed during this visit. Due to the added complexity in care, I will continue to support Angella in the subsequent management and with ongoing continuity of care.

## 2024-10-15 NOTE — PATIENT INSTRUCTIONS
Plan for eye evaluation next April  Get laboratory testing in December  Continue your current medications  Follow up with me in 6 months

## 2024-10-15 NOTE — NURSING NOTE
Current patient location: [address] [place] [id number]    Is the patient currently in the state of [place]? YES    Visit mode:TELEPHONE    If the visit is dropped, the patient can be reconnected by: TELEPHONE VISIT: Phone number: [phone]    Will anyone else be joining the visit? NO  (If patient encounters technical issues they should call [phone]    Are changes needed to the allergy or medication list? No    Patient denies any changes and states that all information remains accurate since last reviewed/verified.     Are refills needed on medications prescribed by this physician? NO    Rooming Documentation:  Questionnaire(s) completed    No other vitals to report today    Reason for visit: CARLOS Wolfe MA VVF

## 2024-10-22 ENCOUNTER — TELEPHONE (OUTPATIENT)
Dept: RHEUMATOLOGY | Facility: CLINIC | Age: 79
End: 2024-10-22
Payer: COMMERCIAL

## 2024-10-22 NOTE — TELEPHONE ENCOUNTER
Patient confirmed scheduled appointment:  Date: April 29th, 2025  Time: 8:30  Visit type: Return Rheumatology Telephone  Provider: Dr. Owens  [place] [facility] [place]imaging: Labs in December  Additional notes: NA

## 2024-11-20 ENCOUNTER — TELEPHONE (OUTPATIENT)
Dept: RHEUMATOLOGY | Facility: CLINIC | Age: 79
End: 2024-11-20
Payer: COMMERCIAL

## 2024-11-20 NOTE — TELEPHONE ENCOUNTER
Faxed lab requests to [facility] at [phone] and also called patient to let them know as well.    Barbara Mehta, CMA

## 2025-04-29 ENCOUNTER — VIRTUAL VISIT (OUTPATIENT)
Dept: RHEUMATOLOGY | Facility: CLINIC | Age: 80
End: 2025-04-29
Attending: INTERNAL MEDICINE
Payer: MEDICARE

## 2025-04-29 VITALS — WEIGHT: 293 LBS | BODY MASS INDEX: 51.91 KG/M2 | HEIGHT: 63 IN

## 2025-04-29 DIAGNOSIS — E55.9 VITAMIN D INSUFFICIENCY: ICD-10-CM

## 2025-04-29 DIAGNOSIS — N18.30 STAGE 3 CHRONIC KIDNEY DISEASE, UNSPECIFIED WHETHER STAGE 3A OR 3B CKD (H): ICD-10-CM

## 2025-04-29 DIAGNOSIS — M94.9 BONE/CARTILAGE DISORDER: ICD-10-CM

## 2025-04-29 DIAGNOSIS — M35.00 SJOGREN'S SYNDROME, WITH UNSPECIFIED ORGAN INVOLVEMENT: ICD-10-CM

## 2025-04-29 DIAGNOSIS — M89.9 BONE/CARTILAGE DISORDER: ICD-10-CM

## 2025-04-29 DIAGNOSIS — Z79.60 LONG-TERM USE OF IMMUNOSUPPRESSANT MEDICATION: ICD-10-CM

## 2025-04-29 DIAGNOSIS — M35.00 SJOGREN'S SYNDROME WITHOUT EXTRAGLANDULAR INVOLVEMENT: ICD-10-CM

## 2025-04-29 PROCEDURE — 98014 SYNCH AUDIO-ONLY EST MOD 30: CPT | Performed by: INTERNAL MEDICINE

## 2025-04-29 PROCEDURE — 1126F AMNT PAIN NOTED NONE PRSNT: CPT | Performed by: INTERNAL MEDICINE

## 2025-04-29 PROCEDURE — G2211 COMPLEX E/M VISIT ADD ON: HCPCS | Performed by: INTERNAL MEDICINE

## 2025-04-29 RX ORDER — HYDROXYCHLOROQUINE SULFATE 200 MG/1
200 TABLET, FILM COATED ORAL 2 TIMES DAILY
Qty: 180 TABLET | Refills: 3 | Status: SHIPPED | OUTPATIENT
Start: 2025-04-29

## 2025-04-29 RX ORDER — ERGOCALCIFEROL 1.25 MG/1
CAPSULE, LIQUID FILLED ORAL
Qty: 6 CAPSULE | Refills: 3 | Status: SHIPPED | OUTPATIENT
Start: 2025-04-29

## 2025-04-29 RX ORDER — CEVIMELINE HYDROCHLORIDE 30 MG/1
30 CAPSULE ORAL 2 TIMES DAILY PRN
Qty: 180 CAPSULE | Refills: 3 | Status: SHIPPED | OUTPATIENT
Start: 2025-04-29

## 2025-04-29 ASSESSMENT — PAIN SCALES - GENERAL: PAINLEVEL_OUTOF10: NO PAIN (0)

## 2025-04-29 NOTE — Clinical Note
I would like Angella (with the help of her daughter) to view a YouTube (consider this one: [email or web address]) to combat shoulder pain and disability.  She is unable to get out of home physical therapy.  Jostin

## 2025-04-29 NOTE — PROGRESS NOTES
Virtual Visit Details    Type of service:  Telephone Visit   Phone call duration: 22 minutes   Originating Location (pt. Location): Home    Distant Location (provider location):  Off-site  Telephone visit completed due to the patient did not have access to video, while the distant provider did.        Ms. Jasmine has Sjogren's disease associated with arthritis.  CHIKI+, SSA+, SSB-, dsDNA-, RF+ (previously, now negative), HCV-, Cryo-; lip biopsy positive for SS.    She feels very stable over the past 6 months. Her joints intermittently will bother her with pain, particularly the right greater than left arm and shoulder. She has considerable pain and stiffness 30-60 minutes. No exercise for her shoulders beyond walking with walker to her bathroom. She has LUIS with walking to the bathroom. She admits her weight contributes to her LUIS. No history physical therapy history.     She notes swelling in the fingers of the hands with redness. Her fingers can trigger at times briefly. She denies preet weakness. Her mouth is dry and she has to be very careful to eat slowly and use water with swallowing. She can have some dysphagia. Her teeth are pretty stable. Her eyes are fine. No problems with foot gout since starting the febuxostat.    Hydroxychloroquine 400 mg daily with plan to check her eyes on May 13th, normal exam one year ago.  Vitamin D 50,000 units twice monthly    PMI:  Medical-HTN, hypothyroidism, sleep apnea on CPAP, obesity, vitamin D deficiency, gout, Sjogren's, sacroiliac joint dysfunction  Surgical-bilateral partial knee arthroplasty, cataract surgery  Injuries-none     SH:  Retired. 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 No tobacco or EtOH. Right handed.     FH:  Sister may have SS and RA  Brother with kidney cancer  Cancer in the family  Mother  with cancer, also had RA  Father  with colon cancer  Children are healthy  Daughter with blood clots, uterine  cancer s/p hysterectomy    PMSF history personally obtained and updated by me this visit.    ROS:  +lymphedema is stable  +Allergy to pcn, codiene, prednisone, sulfa  Remainder of the 14 point ROS obtained and found negative.    Laboratory:     Component      Latest Ref Rng 12/30/2024  10:05 AM   Hemoglobin (External)      11.6 - 15.0 g/dL 14.2 (E)   Hematocrit (External)      35.5 - 44.9 % 43.0 (E)   RBC Count (External)      3.92 - 5.13 10*12/L 4.79 (E)   MCV (External)      78.2 - 97.9 fL 89.8 (E)   RDW (External)      12.2 - 16.1 % 14.0 (E)   Platelet Count (External)      157 - 371 x10(9)/L 171 (E)   WBC Count (External)      3.4 - 9.6 x10(9)/L 4.5 (E)   Absolute Neutrophils (External)      1.56 - 6.45 x10(9)/L 3.55 (E)   Absolute Lymphocytes (External)      0.95 - 3.07 x10(9)/L 0.39 (L) (E)   Absolute Monocytes (External)      0.26 - 0.81 x10(9)/L 0.39 (E)   Absolute Eosinophils (External)      0.03 - 0.48 x10(9)/L 0.13 (E)   Absolute Basophils (External)      0.01 - 0.08 x10(9)/L <0.03 (E)   Potassium (External)      3.6 - 5.2 mmol/L 4.2 (E)   Sodium (External)      135 - 145 mmol/L 141 (E)   Chloride (External)      98 - 107 mmol/L 104 (E)   CO2 (External)      22 - 29 mmol/L 26 (E)   Anion Gap (External)      7 - 15  11 (E)   Urea Nitrogen (External)      6 - 21 mg/dL 20 (E)   Creatinine (External)      0.59 - 1.04 mg/dL 0.94 (E)   GFR Estimated (External)      >=60 mL/min/BSA 62 (E)   Calcium (External)      8.8 - 10.2 mg/dL 9.9 (E)   Glucose (External)      70 - 140 mg/dL 95 (E)   Hemoglobin A1C (External)      4.2 - 5.6 % 5.4 (E)   ALT (External)      7 - 45 U/L 20 (E)   AST (External)      8 - 43 U/L 19    IgM (External)      37 - 286 mg/dL 40 (E)   IgA (External)      61 - 356 mg/dL 142 (E)   IgG (External)      767 - 1,590 mg/dL 817 (E)      Legend:  (L) Low  (E) External lab result    Impression:    Sjogren's Disease-associated with mild arthritis, mild lymphopenia, sicca, with increased [place] free  light chains. Today she is stable and I don't appreciate any features of progressive systemic inflammatory disease. Her sicca is improved with cevimeline and hydroxychloroquine and she tolerates them well. Based on this we will continue the regimen, as well as monitor her serologies and inflammatory markers. I will try to improve her upper extremity comfort and function with stretching and strengthening exercises.     Vitamin D insufficiency-continue the vitamin D supplement.    Long term management of immunosuppression-with no evidence of eye toxicity. Plan for eye visit again this May. Lab testing every 6 months    Plan follow up in 6 - 12 months by phone as traveling to the [place] is a burden for the patient.    A total of 30 minutes was spent in telephone patient interaction and chart review on the day of service.    The longitudinal plan of care for the diagnosis(es)/condition(s) as documented were addressed during this visit. Due to the added complexity in care, I will continue to support Angella in the subsequent management and with ongoing continuity of care.

## 2025-04-29 NOTE — PATIENT INSTRUCTIONS
"Work with your daughter to develop an \"at-home\" stretching and strengthening exercise regimen for your upper arms and shoulders  Continue your current medications  Get lab testing in 3-6 months and repeat again in 1 year  Follow up with me in 6-12 months  "

## 2025-04-29 NOTE — LETTER
4/29/2025       RE: Angella Jasmine  [address] [address] [id number]     Dear Colleague,    Thank you for referring your patient, Angella Jasmine, to the [facility] [place] [facility]. Please see a copy of my visit note below.    Virtual Visit Details    Type of service:  Telephone Visit   Phone call duration: 22 minutes   Originating Location (pt. Location): Home    Distant Location (provider location):  Off-site  Telephone visit completed due to the patient did not have access to video, while the distant provider did.        Ms. Jasmine has Sjogren's disease associated with arthritis.  CHIKI+, SSA+, SSB-, dsDNA-, RF+ (previously, now negative), HCV-, Cryo-; lip biopsy positive for SS.    She feels very stable over the past 6 months. Her joints intermittently will bother her with pain, particularly the right greater than left arm and shoulder. She has considerable pain and stiffness 30-60 minutes. No exercise for her shoulders beyond walking with walker to her bathroom. She has LUIS with walking to the bathroom. She admits her weight contributes to her LUIS. No history physical therapy history.     She notes swelling in the fingers of the hands with redness. Her fingers can trigger at times briefly. She denies preet weakness. Her mouth is dry and she has to be very careful to eat slowly and use water with swallowing. She can have some dysphagia. Her teeth are pretty stable. Her eyes are fine. No problems with foot gout since starting the febuxostat.    Hydroxychloroquine 400 mg daily with plan to check her eyes on May 13th, normal exam one year ago.  Vitamin D 50,000 units twice monthly    PMI:  Medical-HTN, hypothyroidism, sleep apnea on CPAP, obesity, vitamin D deficiency, gout, Sjogren's, sacroiliac joint dysfunction  Surgical-bilateral partial knee arthroplasty, cataract surgery  Injuries-none     SH:  Retired. 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  with two children. No tobacco or EtOH. Right handed.     FH:  Sister may have SS and RA  Brother with kidney cancer  Cancer in the family  Mother  with cancer, also had RA  Father  with colon cancer  Children are healthy  Daughter with blood clots, uterine cancer s/p hysterectomy    PMSF history personally obtained and updated by me this visit.    ROS:  +lymphedema is stable  +Allergy to pcn, codiene, prednisone, sulfa  Remainder of the 14 point ROS obtained and found negative.    Laboratory:     Component      Latest Ref Rng 2024  10:05 AM   Hemoglobin (External)      11.6 - 15.0 g/dL 14.2 (E)   Hematocrit (External)      35.5 - 44.9 % 43.0 (E)   RBC Count (External)      3.92 - 5.13 10*12/L 4.79 (E)   MCV (External)      78.2 - 97.9 fL 89.8 (E)   RDW (External)      12.2 - 16.1 % 14.0 (E)   Platelet Count (External)      157 - 371 x10(9)/L 171 (E)   WBC Count (External)      3.4 - 9.6 x10(9)/L 4.5 (E)   Absolute Neutrophils (External)      1.56 - 6.45 x10(9)/L 3.55 (E)   Absolute Lymphocytes (External)      0.95 - 3.07 x10(9)/L 0.39 (L) (E)   Absolute Monocytes (External)      0.26 - 0.81 x10(9)/L 0.39 (E)   Absolute Eosinophils (External)      0.03 - 0.48 x10(9)/L 0.13 (E)   Absolute Basophils (External)      0.01 - 0.08 x10(9)/L <0.03 (E)   Potassium (External)      3.6 - 5.2 mmol/L 4.2 (E)   Sodium (External)      135 - 145 mmol/L 141 (E)   Chloride (External)      98 - 107 mmol/L 104 (E)   CO2 (External)      22 - 29 mmol/L 26 (E)   Anion Gap (External)      7 - 15  11 (E)   Urea Nitrogen (External)      6 - 21 mg/dL 20 (E)   Creatinine (External)      0.59 - 1.04 mg/dL 0.94 (E)   GFR Estimated (External)      >=60 mL/min/BSA 62 (E)   Calcium (External)      8.8 - 10.2 mg/dL 9.9 (E)   Glucose (External)      70 - 140 mg/dL 95 (E)   Hemoglobin A1C (External)      4.2 - 5.6 % 5.4 (E)   ALT (External)      7 - 45 U/L 20 (E)   AST (External)      8 - 43  U/L 19    IgM (External)      37 - 286 mg/dL 40 (E)   IgA (External)      61 - 356 mg/dL 142 (E)   IgG (External)      767 - 1,590 mg/dL 817 (E)      Legend:  (L) Low  (E) External lab result    Impression:    Sjogren's Disease-associated with mild arthritis, mild lymphopenia, sicca, with increased [place] free light chains. Today she is stable and I don't appreciate any features of progressive systemic inflammatory disease. Her sicca is improved with cevimeline and hydroxychloroquine and she tolerates them well. Based on this we will continue the regimen, as well as monitor her serologies and inflammatory markers. I will try to improve her upper extremity comfort and function with stretching and strengthening exercises.     Vitamin D insufficiency-continue the vitamin D supplement.    Long term management of immunosuppression-with no evidence of eye toxicity. Plan for eye visit again this May. Lab testing every 6 months    Plan follow up in 6 - 12 months by phone as traveling to the [place] is a burden for the patient.    A total of 30 minutes was spent in telephone patient interaction and chart review on the day of service.    The longitudinal plan of care for the diagnosis(es)/condition(s) as documented were addressed during this visit. Due to the added complexity in care, I will continue to support Angella in the subsequent management and with ongoing continuity of care.        Again, thank you for allowing me to participate in the care of your patient.      Sincerely,    Grey Owens MD

## 2025-04-29 NOTE — NURSING NOTE
Current patient location: [address] [place] [id number]    Is the patient currently in the state of [place]? YES    Visit mode: TELEPHONE    If the visit is dropped, the patient can be reconnected by:TELEPHONE VISIT: Phone number:   Telephone Information:   Mobile [phone]       Will anyone else be joining the visit? NO  (If patient encounters technical issues they should call [phone]    Are changes needed to the allergy or medication list? Pt stated no med changes    Are refills needed on medications prescribed by this physician? Discuss with provider    Rooming Documentation:  Questionnaire(s) completed    Reason for visit: RECHDEVON GIBBONSF

## 2025-05-12 ENCOUNTER — TELEPHONE (OUTPATIENT)
Dept: RHEUMATOLOGY | Facility: CLINIC | Age: 80
End: 2025-05-12
Payer: COMMERCIAL

## 2025-05-12 NOTE — TELEPHONE ENCOUNTER
Patient confirmed scheduled appointment:  Date: April 14th, 2025  Time: 8:30a  Visit type: Return Rheumatology telephone  Provider: Dr. Owens  Location: [facility]  Testing/imaging: Outside lab testing  Additional notes: Pt inquired about [employer] arm exercises. Sent message to nursing staff to contact pt daughter about them.